# Patient Record
Sex: FEMALE | Race: WHITE | NOT HISPANIC OR LATINO | Employment: OTHER | URBAN - METROPOLITAN AREA
[De-identification: names, ages, dates, MRNs, and addresses within clinical notes are randomized per-mention and may not be internally consistent; named-entity substitution may affect disease eponyms.]

---

## 2017-01-10 ENCOUNTER — TELEPHONE (OUTPATIENT)
Dept: NEUROLOGY | Facility: CLINIC | Age: 67
End: 2017-01-10

## 2017-01-10 NOTE — TELEPHONE ENCOUNTER
----- Message from Lara Parekh sent at 1/10/2017 12:10 PM CST -----  Contact: Veena[facility]  Veena called and wanted to know if this patient's PO steroids are going to be extended.    Phone: [phone]

## 2017-01-23 ENCOUNTER — HOSPITAL ENCOUNTER (OUTPATIENT)
Dept: RADIOLOGY | Facility: HOSPITAL | Age: 67
Discharge: HOME | End: 2017-01-23
Attending: PSYCHIATRY & NEUROLOGY
Payer: MEDICARE

## 2017-01-23 DIAGNOSIS — G35 MULTIPLE SCLEROSIS: ICD-10-CM

## 2017-01-23 PROCEDURE — 25500020 PHARM REV CODE 255: Performed by: PSYCHIATRY & NEUROLOGY

## 2017-01-23 PROCEDURE — 70553 MRI BRAIN STEM W/O & W/DYE: CPT | Mod: TC

## 2017-01-23 PROCEDURE — A9585 GADOBUTROL INJECTION: HCPCS | Performed by: PSYCHIATRY & NEUROLOGY

## 2017-01-23 PROCEDURE — 70553 MRI BRAIN STEM W/O & W/DYE: CPT | Mod: 26,,, | Performed by: RADIOLOGY

## 2017-01-23 RX ORDER — GADOBUTROL 604.72 MG/ML
8.5 INJECTION INTRAVENOUS
Status: COMPLETED | OUTPATIENT
Start: 2017-01-23 | End: 2017-01-23

## 2017-01-23 RX ADMIN — GADOBUTROL 8.5 ML: 604.72 INJECTION INTRAVENOUS at 01:01

## 2017-02-07 ENCOUNTER — TELEPHONE (OUTPATIENT)
Dept: NEUROLOGY | Facility: CLINIC | Age: 67
End: 2017-02-07

## 2017-02-07 ENCOUNTER — DOCUMENTATION ONLY (OUTPATIENT)
Dept: NEUROLOGY | Facility: CLINIC | Age: 67
End: 2017-02-07

## 2017-02-07 NOTE — TELEPHONE ENCOUNTER
PA for Provigil completed via CMM. Call placed to [facility]. Spoke with Sandra. She states they have 90 day refills on file for Ampyra for pt, but her insurance will only allow for 30 day fills at a time.    Call placed to pt. Informed her that PA has now been completed and is pending. Also informed her about Ampyra rx as above. Verbalizes understanding of all.

## 2017-02-07 NOTE — TELEPHONE ENCOUNTER
----- Message from Ekaterina Pereyra sent at 2017  3:50 PM CST -----  Contact: self @ [phone]  pts prior auth for modafinil (PROVIGIL) 200 MG Tab is about to .  Pt will need the new prior auth before 17.  Pt is req a new 90 day prescription for dalfampridine (AMPYRA) 10 mg Tb12.  Pt says her current prescription is only for a 30 day supply and she has to request it to often from the mail in pharmacy.  [facility]

## 2017-03-14 ENCOUNTER — TELEPHONE (OUTPATIENT)
Dept: NEUROLOGY | Facility: CLINIC | Age: 67
End: 2017-03-14

## 2017-03-14 NOTE — TELEPHONE ENCOUNTER
----- Message from Nunu Sanchez sent at 3/14/2017  9:52 AM CDT -----  Contact: self  Pt had to cx her appt for 3/20 because her  is having eye surgery that day.  She would like to reschedule to the week of 4/3 please.  Call her at [phone]

## 2017-04-19 ENCOUNTER — OFFICE VISIT (OUTPATIENT)
Dept: NEUROLOGY | Facility: CLINIC | Age: 67
End: 2017-04-19
Payer: MEDICARE

## 2017-04-19 VITALS
WEIGHT: 188 LBS | SYSTOLIC BLOOD PRESSURE: 169 MMHG | HEART RATE: 72 BPM | HEIGHT: 60 IN | BODY MASS INDEX: 36.91 KG/M2 | DIASTOLIC BLOOD PRESSURE: 86 MMHG

## 2017-04-19 DIAGNOSIS — Z71.89 COUNSELING REGARDING GOALS OF CARE: ICD-10-CM

## 2017-04-19 DIAGNOSIS — G47.33 OSA (OBSTRUCTIVE SLEEP APNEA): ICD-10-CM

## 2017-04-19 DIAGNOSIS — R26.9 GAIT DISTURBANCE: ICD-10-CM

## 2017-04-19 DIAGNOSIS — R53.82 CHRONIC FATIGUE: ICD-10-CM

## 2017-04-19 DIAGNOSIS — G35 MULTIPLE SCLEROSIS: Primary | ICD-10-CM

## 2017-04-19 DIAGNOSIS — Z29.89 PROPHYLACTIC IMMUNOTHERAPY: ICD-10-CM

## 2017-04-19 PROCEDURE — 99214 OFFICE O/P EST MOD 30 MIN: CPT | Mod: PBBFAC | Performed by: PHYSICIAN ASSISTANT

## 2017-04-19 PROCEDURE — 99999 PR PBB SHADOW E&M-EST. PATIENT-LVL IV: CPT | Mod: PBBFAC,,, | Performed by: PHYSICIAN ASSISTANT

## 2017-04-19 PROCEDURE — 99215 OFFICE O/P EST HI 40 MIN: CPT | Mod: S$PBB,,, | Performed by: PHYSICIAN ASSISTANT

## 2017-04-19 NOTE — MR AVS SNAPSHOT
[facility]  [address]  [facility] [address]  Phone: [phone]                  Marie Lejeune   2017 12:45 PM   Office Visit    Description:  Female : 1950   Provider:  Ceci Servin PA-C   Department:  [facility]           Reason for Visit     Neurologic Problem           Diagnoses this Visit        Comments    Multiple sclerosis    -  Primary     ROLAN (obstructive sleep apnea)         Counseling regarding goals of care         Gait disturbance         Chronic fatigue         Prophylactic immunotherapy                To Do List           Future Appointments        Provider Department Dept Phone    10/18/2017 1:00 PM MD [facility] [phone]      Goals (5 Years of Data)     None      Follow-Up and Disposition     Return in about 6 months (around 10/19/2017).      [facility]     [facility] Nurse Care Line -  Assistance  Unless otherwise directed by your provider, please contact [facility], our nurse care line that is available for  assistance.     Registered nurses in the [facility] Center provide: appointment scheduling, clinical advisement, health education, and other advisory services.  Call: [phone] (toll free)               Medications                Verify that the below list of medications is an accurate representation of the medications you are currently taking.  If none reported, the list may be blank. If incorrect, please contact your healthcare provider. Carry this list with you in case of emergency.           Current Medications     amlodipine (NORVASC) 10 MG tablet Take 10 mg by mouth once daily.    ascorbic acid (VITAMIN C) 500 MG tablet Take 500 mg by mouth once daily.    atenolol (TENORMIN) 50 MG tablet Take 25 mg by mouth once daily.    biotin 300 mcg Tab PATIENT NOT CERTAIN ON THE DOSAGE. TAKE ONE capsule by MOUTH daily    calcium carbonate (OS-ANISHA) 600 mg  (1,500 mg) Tab Take 600 mg by mouth 2 (two) times daily with meals.    co-enzyme Q-10 30 mg capsule Take 400 mg by mouth once daily.    cyanocobalamin (VITAMIN B-12) 500 MCG tablet Take 500 mcg by mouth once daily.    dalfampridine (AMPYRA) 10 mg Tb12 Take 10 mg by mouth 2 (two) times daily. Extended Release 12 hour    ergocalciferol (VITAMIN D2) 50,000 unit Cap Take 5,000 Units by mouth once daily.     fish oil-omega-3 fatty acids 300-1,000 mg capsule Take 1,000 g by mouth once daily.    glatiramer (COPAXONE) 40 mg/mL Syrg injection Inject 40 mg into the skin 3 (three) times a week.    methylPREDNISolone sodium succinate (SOLU-MEDROL) 1,000 mg injection Solumedrol 1gm IV solution to be mixed in a smoothie and taken orally once a month for 6 months total. To begin in August.    modafinil (PROVIGIL) 200 MG Tab Take 1 tablet (200 mg total) by mouth 2 (two) times daily.    multivitamin (ONE DAILY MULTIVITAMIN) per tablet Take 1 tablet by mouth once daily.    simvastatin (ZOCOR) 40 MG tablet Take 40 mg by mouth every evening.           Clinical Reference Information           Your Vitals Were     BP Pulse Height Weight BMI    169/86 72 5' (1.524 m) 85.3 kg (188 lb) 36.72 kg/m2      Blood Pressure          Most Recent Value    BP  (!)  169/86      Allergies as of 4/19/2017     No Known Allergies      Immunizations Administered on Date of Encounter - 4/19/2017     None      Language Assistance Services     ATTENTION: Language assistance services are available, free of charge. Please call [phone].      ATENCIÓN: Si asha chery, tiene a talley disposición servicios gratuitos de asistencia lingüística. Llame al [phone].     GI Ý: N?u b?n nói Ti?ng Vi?t, có các d?ch v? h? tr? ngôn ng? mi?n phí dành cho b?n. G?i s? [phone].         [facility] complies with applicable Federal civil rights laws and does not discriminate on the basis of race, color, national origin, age, disability, or sex.

## 2017-04-19 NOTE — PROGRESS NOTES
"Subjective:       Patient ID: Marie Lejeune is a 66 y.o. female who presents today for a routine clinic visit with her  for MS. Patient provides history today.     MS HPI:  · DMT: Copaxone  · Side effects from DMT? No  · Taking vitamin D3 as recommended? Yes -  Dose: 5,000 IU daily  · Patient feeling well overall.  · BP slighly elevated today; however, at home she states it's "pretty steady"  · Patient rides stationary bike almost every day for about 20 minutes, she is looking forward to getting back into her pool over the summer for exercise as well.   · Has started Nutri system and has lost "a few pounds"    SOCIAL HISTORY  Social History   Substance Use Topics    Smoking status: Never Smoker    Smokeless tobacco: Never Used    Alcohol use None     Living arrangements - the patient lives with her .  Employment Retired    MS ROS:  · Fatigue: Yes - Provigil twice  · Sleep Disturbance: No, has ROLAN but is unable to use CPAP  · Bladder Dysfunction: No  · Bowel Dysfunction: No  · Spasticity: No  · Visual Symptoms: Yes - stable  · Cognitive: Yes - worse with heat and fatigue  · Mood Disorder: No  · Gait Disturbance: Yes - Ampyra   · Falls: No  · Hand Dysfunction: No  · Pain: No  · Sexual Dysfunction: Not Assessed  · Skin Breakdown: No  · Tremors: No  · Dysphagia:  No  · Dysarthria:  No  · Heat sensitivity:  Yes   · Any un-met adaptive needs? No  · Copay Assist?  Yes -0$  · Clinical Trial candidate? No        Objective:        1. 25 foot timed walk:  Timed 25 Foot Walk: 4/19/2017   Did patient wear an AFO? No   Was assistive device used? Yes   Assistive device used (cira one): Bilateral Assistance   Bilateral device used Walker/Rollator   Time for 25 Foot Walk (seconds) 13.9       Neurologic Exam     Mental Status   Oriented to person, place, and time.   Follows 3 step commands.   Speech: speech is normal   Level of consciousness: alert  Normal comprehension.     Cranial Nerves     CN II   Visual acuity: " decreased (patient without glasses today; 20/200 OD 20/60 OS)    CN III, IV, VI   Pupils are equal, round, and reactive to light.  Extraocular motions are normal.     CN V   Facial sensation intact.     CN VII   Facial expression full, symmetric.     CN VIII   Hearing: intact (finger rub)    CN IX, X   Palate: symmetric    CN XI   CN XI normal.     CN XII   Tongue deviation: none       Disc pallor OD     Motor Exam   Muscle bulk: normal  Right arm tone: normal  Left arm tone: normal  Right leg tone: increased  Left leg tone: increased    Strength   Right deltoid: 5/5  Left deltoid: 5/5  Right triceps: 5/5  Left triceps: 5/5  Right wrist extension: 5/5  Left wrist extension: 5/5  Right interossei: 5/5  Left interossei: 5/5  Right iliopsoas: 3/5  Left iliopsoas: 3/5  Right hamstrin/5  Left hamstrin/5  Right anterior tibial: 5/5  Left anterior tibial: 5/5  Right peroneal: 4/5  Left peroneal: 5/5       3+/5 bilateral hip flexors     Sensory Exam   Light touch normal.   Right arm vibration: normal  Left arm vibration: normal  Right leg vibration: decreased from toes  Left leg vibration: decreased from toes    Gait, Coordination, and Reflexes     Gait  Gait: circumduction (L)    Coordination   Finger to nose coordination: normal  Tandem walking coordination: abnormal    Tremor   Resting tremor: absent  Action tremor: absent    Reflexes   Right brachioradialis: 2+  Left brachioradialis: 2+  Right biceps: 2+  Left biceps: 2+  Right triceps: 2+  Left triceps: 2+  Right patellar: 3+  Left patellar: 3+  Right achilles: 2+  Left achilles: 2+  Right plantar: upgoing  Left plantar: upgoing  Right ankle clonus: absent  Left ankle clonus: absent  Right pendular knee jerk: absent  Left pendular knee jerk: absent         Normal RSM UE's         Imaging:     Results for orders placed during the hospital encounter of 17   MRI Brain W WO Contrast    Narrative MRI brain with contrast    17 12:09:25    Accession#  [id number]    CLINICAL INDICATION: 66 year old F with MS      TECHNIQUE: Multiplanar multisequence MR imaging of the brain was performed before and after the administration of 8.5 ml Gadavistintravenous contrast.   COMPARISON:  07/18/2016    FINDINGS:    The ventricles are normal in size for age, without evidence of hydrocephalus.    Multiple discrete T2/FLAIR hyperintense foci again present throughout the supra-tentorial white matter, consistent with the reported history of multiple sclerosis. Many lesions exhibit the typical radiating perpendicular orientation relative to the lateral ventricles. No definite new discrete lesions are identified. No abnormal postcontrast enhancement to indicate active demyelination. No new parenchymal mass, hemorrhage or infarction.    No extra-axial blood or fluid collections.     T2 skull base flow voids are preserved. Bone marrow signal intensity is unremarkable.    Impression Stable appearance of the brain, again noting multiple supratentorial white matter lesions consistent with the reported history of multiple sclerosis. No new or enhancing lesion to indicate ongoing or active demyelination.    Electronically signed by: SARTHAK LOVE MD  Date:     01/23/17  Time:    15:05      Labs:     Lab Results   Component Value Date    BVTXLGSI06RJ >96 (A) 07/14/2016     Lab Results   Component Value Date    JCVINDEX 2.87 (A) 07/14/2016    JCVANTIBODY Positive (A) 07/14/2016     Lab Results   Component Value Date    CO1HJYSQ 64.8 11/14/2016    ABSOLUTECD3 654 (L) 11/14/2016    WY2KCQJB 20.5 11/14/2016    ABSOLUTECD8 207 11/14/2016    AI4XCANA 44.1 11/14/2016    ABSOLUTECD4 445 11/14/2016    LABCD48 2.15 11/14/2016     Lab Results   Component Value Date    CREATININE 0.9 01/23/2017       Diagnosis/Assessment/Plan:    1. Multiple Sclerosis  · Assessment: Patient's timed walk is improved today. She has transitioned well onto Copaxone and appears both clinically and radiographically  stable  · Imagin month interval MRI stable on Copaxone. Report and images reviewed with patient and  today in clinic  · Disease Modifying Therapies: Continue Copaxone and high dose Vit D3.     2. MS Symptom Assessment / Management  · Fatigue: continue Provigil BID  · Sleep Disturbance: ROLAN-not able to use cpap-focusing on weight loss  · Visual Symptoms: Stable-has seen Dr. López to establish care(2016)  · Gait Disturbance: continue Ampyra-will continue to monitor CrCL      Over 50% of this 40 minute visit was spent in direct face to face counseling of the patient regarding her current symptoms and management of same, review of MRI, medication management.  Follow up in 6 months with Dr. Joshua  Patient agreed to POC today.    Attending, Dr. Joshua, was available during today's encounter. Any change to plan along with cosign to appear in the EMR.     Ceci Servin PA-C  [facility]    Multiple sclerosis    ROLAN (obstructive sleep apnea)    Counseling regarding goals of care    Gait disturbance    Chronic fatigue    Prophylactic immunotherapy

## 2017-07-11 DIAGNOSIS — G35 MULTIPLE SCLEROSIS: ICD-10-CM

## 2017-07-11 DIAGNOSIS — R53.82 CHRONIC FATIGUE: ICD-10-CM

## 2017-07-11 DIAGNOSIS — G47.33 OSA (OBSTRUCTIVE SLEEP APNEA): ICD-10-CM

## 2017-07-11 RX ORDER — MODAFINIL 200 MG/1
200 TABLET ORAL 2 TIMES DAILY
Qty: 180 TABLET | Refills: 1 | Status: SHIPPED | OUTPATIENT
Start: 2017-07-11 | End: 2017-08-08 | Stop reason: SDUPTHER

## 2017-07-11 NOTE — TELEPHONE ENCOUNTER
----- Message from Kindra Salazar sent at 7/11/2017  1:10 PM CDT -----  Contact: PT  Refill modafinil (PROVIGIL) 200 MG Tab, dalfampridine (AMPYRA) 10 mg Tb12    PT is out of medication, her next appt is not until 10-18-17    She also need both Rx to be a 90 day supply.     Pharmacy: [phone]

## 2017-08-08 DIAGNOSIS — G35 MULTIPLE SCLEROSIS: ICD-10-CM

## 2017-08-08 DIAGNOSIS — R53.82 CHRONIC FATIGUE: ICD-10-CM

## 2017-08-08 DIAGNOSIS — G47.33 OSA (OBSTRUCTIVE SLEEP APNEA): ICD-10-CM

## 2017-08-08 RX ORDER — MODAFINIL 200 MG/1
200 TABLET ORAL 2 TIMES DAILY
Qty: 180 TABLET | Refills: 1 | Status: SHIPPED | OUTPATIENT
Start: 2017-08-08 | End: 2017-11-08 | Stop reason: SDUPTHER

## 2017-08-08 NOTE — TELEPHONE ENCOUNTER
Called [facility], spoke with Farooq and cancelled Modafinil prescription on file.

## 2017-08-08 NOTE — TELEPHONE ENCOUNTER
----- Message from Ekaterina Pereyra sent at 8/8/2017  3:25 PM CDT -----  Contact: self @ [phone]  Pt is calling for the status of her refill request for modafinil (PROVIGIL) 200 MG Tab 90 day supply with refills.  [facility] [employer]

## 2017-08-14 ENCOUNTER — TELEPHONE (OUTPATIENT)
Dept: NEUROLOGY | Facility: CLINIC | Age: 67
End: 2017-08-14

## 2017-08-14 DIAGNOSIS — Z79.899 HIGH RISK MEDICATION USE: ICD-10-CM

## 2017-08-14 DIAGNOSIS — G35 MULTIPLE SCLEROSIS: Primary | ICD-10-CM

## 2017-08-15 ENCOUNTER — LAB VISIT (OUTPATIENT)
Dept: LAB | Facility: HOSPITAL | Age: 67
End: 2017-08-15
Attending: PHYSICIAN ASSISTANT
Payer: MEDICARE

## 2017-08-15 DIAGNOSIS — Z79.899 HIGH RISK MEDICATION USE: ICD-10-CM

## 2017-08-15 DIAGNOSIS — G35 MULTIPLE SCLEROSIS: ICD-10-CM

## 2017-08-15 LAB
ALBUMIN SERPL BCP-MCNC: 4 G/DL
ALP SERPL-CCNC: 72 U/L
ALT SERPL W/O P-5'-P-CCNC: 18 U/L
ANION GAP SERPL CALC-SCNC: 7 MMOL/L
AST SERPL-CCNC: 19 U/L
BILIRUB SERPL-MCNC: 0.3 MG/DL
BUN SERPL-MCNC: 19 MG/DL
CALCIUM SERPL-MCNC: 10.1 MG/DL
CHLORIDE SERPL-SCNC: 98 MMOL/L
CO2 SERPL-SCNC: 30 MMOL/L
CREAT SERPL-MCNC: 0.8 MG/DL
EST. GFR  (AFRICAN AMERICAN): >60 ML/MIN/1.73 M^2
EST. GFR  (NON AFRICAN AMERICAN): >60 ML/MIN/1.73 M^2
GLUCOSE SERPL-MCNC: 115 MG/DL
POTASSIUM SERPL-SCNC: 4.2 MMOL/L
PROT SERPL-MCNC: 7.4 G/DL
SODIUM SERPL-SCNC: 135 MMOL/L

## 2017-08-15 PROCEDURE — 36415 COLL VENOUS BLD VENIPUNCTURE: CPT

## 2017-08-15 PROCEDURE — 80053 COMPREHEN METABOLIC PANEL: CPT

## 2017-08-17 RX ORDER — DALFAMPRIDINE 10 MG/1
10 TABLET, FILM COATED, EXTENDED RELEASE ORAL 2 TIMES DAILY
Qty: 180 TABLET | Refills: 1 | Status: SHIPPED | OUTPATIENT
Start: 2017-08-17 | End: 2017-11-09 | Stop reason: SDUPTHER

## 2017-10-09 ENCOUNTER — TELEPHONE (OUTPATIENT)
Dept: NEUROLOGY | Facility: CLINIC | Age: 67
End: 2017-10-09

## 2017-10-09 NOTE — TELEPHONE ENCOUNTER
----- Message from Ekaterina Pereyra sent at 10/9/2017  9:30 AM CDT -----  Contact: self @ [phone]  Pt is scheduled to f/u with dr dubois on 10-18-17.  Pt is calling to see if she can reschedule for the end of October or the beginning of November.  pls call with a new afternoon appt.

## 2017-11-08 ENCOUNTER — OFFICE VISIT (OUTPATIENT)
Dept: NEUROLOGY | Facility: CLINIC | Age: 67
End: 2017-11-08
Payer: MEDICARE

## 2017-11-08 ENCOUNTER — LAB VISIT (OUTPATIENT)
Dept: LAB | Facility: HOSPITAL | Age: 67
End: 2017-11-08
Payer: MEDICARE

## 2017-11-08 VITALS
BODY MASS INDEX: 35.93 KG/M2 | HEART RATE: 73 BPM | HEIGHT: 60 IN | DIASTOLIC BLOOD PRESSURE: 84 MMHG | SYSTOLIC BLOOD PRESSURE: 158 MMHG | WEIGHT: 183 LBS

## 2017-11-08 DIAGNOSIS — R53.82 CHRONIC FATIGUE: ICD-10-CM

## 2017-11-08 DIAGNOSIS — G35 MULTIPLE SCLEROSIS: ICD-10-CM

## 2017-11-08 DIAGNOSIS — Z86.69 HISTORY OF OPTIC NEURITIS: ICD-10-CM

## 2017-11-08 DIAGNOSIS — G35 MULTIPLE SCLEROSIS: Primary | ICD-10-CM

## 2017-11-08 DIAGNOSIS — Z29.89 PROPHYLACTIC IMMUNOTHERAPY: ICD-10-CM

## 2017-11-08 DIAGNOSIS — R26.9 GAIT DISTURBANCE: ICD-10-CM

## 2017-11-08 DIAGNOSIS — Z71.89 COUNSELING REGARDING GOALS OF CARE: ICD-10-CM

## 2017-11-08 DIAGNOSIS — G47.33 OSA (OBSTRUCTIVE SLEEP APNEA): ICD-10-CM

## 2017-11-08 LAB
CREAT SERPL-MCNC: 0.8 MG/DL
EST. GFR  (AFRICAN AMERICAN): >60 ML/MIN/1.73 M^2
EST. GFR  (NON AFRICAN AMERICAN): >60 ML/MIN/1.73 M^2

## 2017-11-08 PROCEDURE — 99213 OFFICE O/P EST LOW 20 MIN: CPT | Mod: PBBFAC | Performed by: PHYSICIAN ASSISTANT

## 2017-11-08 PROCEDURE — 82565 ASSAY OF CREATININE: CPT

## 2017-11-08 PROCEDURE — 99215 OFFICE O/P EST HI 40 MIN: CPT | Mod: S$PBB,,, | Performed by: PHYSICIAN ASSISTANT

## 2017-11-08 PROCEDURE — 99999 PR PBB SHADOW E&M-EST. PATIENT-LVL III: CPT | Mod: PBBFAC,,, | Performed by: PHYSICIAN ASSISTANT

## 2017-11-08 PROCEDURE — 36415 COLL VENOUS BLD VENIPUNCTURE: CPT

## 2017-11-08 RX ORDER — LOSARTAN POTASSIUM 100 MG/1
TABLET ORAL
COMMUNITY
Start: 2017-09-24

## 2017-11-08 RX ORDER — MODAFINIL 200 MG/1
200 TABLET ORAL 2 TIMES DAILY
Qty: 180 TABLET | Refills: 1 | Status: SHIPPED | OUTPATIENT
Start: 2017-11-08 | End: 2018-05-07 | Stop reason: SDUPTHER

## 2017-11-08 RX ORDER — GLATIRAMER 40 MG/ML
40 INJECTION, SOLUTION SUBCUTANEOUS
Qty: 36 SYRINGE | Refills: 5 | Status: SHIPPED | OUTPATIENT
Start: 2017-11-08 | End: 2018-05-07 | Stop reason: SDUPTHER

## 2017-11-08 NOTE — PROGRESS NOTES
Subjective:       Patient ID: Marie Lejeune is a 67 y.o. female who presents today for a routine clinic visit for MS.    MS HPI:  · DMT: Copaxone  · Side effects from DMT? No  · Taking vitamin D3 as recommended? Yes -  Dose: 5,000 IU daily  · Patient states she is feeling well    SOCIAL HISTORY  Social History   Substance Use Topics    Smoking status: Never Smoker    Smokeless tobacco: Never Used    Alcohol use Not on file     Living arrangements - the patient lives with her .  Employment Retired    MS ROS:  · Fatigue: Yes - Provigil 200mg BID  · Sleep Disturbance: Yes - Melatonin, diagnosed ROLAN(not using CPAP)  · Bladder Dysfunction: No--drinks a lot a water  · Bowel Dysfunction: No  · Spasticity: No  · Visual Symptoms: No--history of ON  · Cognitive: No  · Mood Disorder: No  · Gait Disturbance: Yes - Ampyra-requests refill--she feels beneficial  · Falls: No  · Hand Dysfunction: No  · Pain: No  · Sexual Dysfunction: Not Assessed  · Skin Breakdown: No  · Tremors: No  · Dysphagia:  No  · Dysarthria:  No  · Heat sensitivity:  Yes - extremes of temperature are issue  · Any un-met adaptive needs? No  · Copay Assist?  Yes -0$  · Clinical Trial candidate? No          Objective:        1. 25 foot timed walk:14.2 with tri-wheeled walker; 15.2s with tri-wheeled walker(June 2016)  Timed 25 Foot Walk: 4/19/2017   Did patient wear an AFO? No   Was assistive device used? Yes   Assistive device used (cira one): Bilateral Assistance   Bilateral device used Walker/Rollator   Time for 25 Foot Walk (seconds) 13.9       Neurologic Exam    Neurologic Exam      Mental Status   Oriented to person, place, and time.   Follows 3 step commands.   Speech: speech is normal   Level of consciousness: alert  Normal comprehension.      Cranial Nerves    Disc pallor noted OD  Acuity OD: corrected 20/200; OS : corrected 20/20(stable)     CN III, IV, VI   Pupils are equal, round, and reactive to light.  Extraocular motions are normal.       CN V   Facial sensation intact.      CN VII   Facial expression full, symmetric.      CN VIII   Hearing: intact (finger rub)     CN IX, X   Palate: symmetric     CN XI   CN XI normal.      CN XII   Tongue deviation: none       Disc pallor OD      Motor Exam   Muscle bulk: normal  Right arm tone: normal  Left arm tone: normal  Right leg tone: increased  Left leg tone: increased     Strength   Right deltoid: 5/5  Left deltoid: 5/5  Right triceps: 5/5  Left triceps: 5/5  Right wrist extension: 5/5  Left wrist extension: 5/5  Right interossei: 5/5  Left interossei: 5/5  Right iliopsoas: 3/5  Left iliopsoas: 3/5  Right hamstrin/5  Left hamstrin/5  Right anterior tibial: 5/5  Left anterior tibial: 5/5  Right peroneal: 4/5  Left peroneal: 5/5       3+/5 bilateral hip flexors      Sensory Exam   Light touch normal.   Right arm vibration: normal  Left arm vibration: normal  Right leg vibration: decreased from toes  Left leg vibration: decreased from toes     Gait, Coordination, and Reflexes      Gait  Gait: circumduction (L)     Coordination   Finger to nose coordination: normal  Tandem walking coordination: abnormal     Tremor   Resting tremor: absent  Action tremor: absent     Reflexes   Right brachioradialis: 2+  Left brachioradialis: 2+  Right biceps: 2+  Left biceps: 2+  Right triceps: 2+  Left triceps: 2+  Right patellar: 3+  Left patellar: 3+  Right achilles: 2+  Left achilles: 2+  Right ankle clonus: absent  Left ankle clonus: absent  Right pendular knee jerk: absent  Left pendular knee jerk: absent         Normal RSM UE's      Imaging:     Results for orders placed during the hospital encounter of 17   MRI Brain W WO Contrast    Impression Stable appearance of the brain, again noting multiple supratentorial white matter lesions consistent with the reported history of multiple sclerosis. No new or enhancing lesion to indicate ongoing or active demyelination.        Electronically signed by: SARTHAK  IVAN ARMAS  Date:     01/23/17  Time:    15:05      No results found for this or any previous visit.  No results found for this or any previous visit.      Labs:       Lab Results   Component Value Date    HCPSLUOK06BH >96 (A) 07/14/2016     Lab Results   Component Value Date    JCVINDEX 2.87 (A) 07/14/2016    JCVANTIBODY Positive (A) 07/14/2016     Lab Results   Component Value Date    AP2XMJDJ 64.8 11/14/2016    ABSOLUTECD3 654 (L) 11/14/2016    ME7EYWLK 20.5 11/14/2016    ABSOLUTECD8 207 11/14/2016    FB1XXHVP 44.1 11/14/2016    ABSOLUTECD4 445 11/14/2016    LABCD48 2.15 11/14/2016     Lab Results   Component Value Date    WBC 8.85 11/14/2016    HGB 13.4 11/14/2016    HCT 39.9 11/14/2016    MCV 92 11/14/2016     11/14/2016     Sodium   Date Value Ref Range Status   08/15/2017 135 (L) 136 - 145 mmol/L Final     Potassium   Date Value Ref Range Status   08/15/2017 4.2 3.5 - 5.1 mmol/L Final     Chloride   Date Value Ref Range Status   08/15/2017 98 95 - 110 mmol/L Final     CO2   Date Value Ref Range Status   08/15/2017 30 (H) 23 - 29 mmol/L Final     Glucose   Date Value Ref Range Status   08/15/2017 115 (H) 70 - 110 mg/dL Final     BUN, Bld   Date Value Ref Range Status   08/15/2017 19 8 - 23 mg/dL Final     Creatinine   Date Value Ref Range Status   11/08/2017 0.8 0.5 - 1.4 mg/dL Final     Calcium   Date Value Ref Range Status   08/15/2017 10.1 8.7 - 10.5 mg/dL Final     Total Protein   Date Value Ref Range Status   08/15/2017 7.4 6.0 - 8.4 g/dL Final     Albumin   Date Value Ref Range Status   08/15/2017 4.0 3.5 - 5.2 g/dL Final     Total Bilirubin   Date Value Ref Range Status   08/15/2017 0.3 0.1 - 1.0 mg/dL Final     Comment:     For infants and newborns, interpretation of results should be based  on gestational age, weight and in agreement with clinical  observations.  Premature Infant recommended reference ranges:  Up to 24 hours.............<8.0 mg/dL  Up to 48 hours............<12.0 mg/dL  3-5  days..................<15.0 mg/dL  6-29 days.................<15.0 mg/dL       Alkaline Phosphatase   Date Value Ref Range Status   08/15/2017 72 55 - 135 U/L Final     AST   Date Value Ref Range Status   08/15/2017 19 10 - 40 U/L Final     ALT   Date Value Ref Range Status   08/15/2017 18 10 - 44 U/L Final     Anion Gap   Date Value Ref Range Status   08/15/2017 7 (L) 8 - 16 mmol/L Final     eGFR if    Date Value Ref Range Status   11/08/2017 >60.0 >60 mL/min/1.73 m^2 Final     eGFR if non    Date Value Ref Range Status   11/08/2017 >60.0 >60 mL/min/1.73 m^2 Final     Comment:     Calculation used to obtain the estimated glomerular filtration  rate (eGFR) is the CKD-EPI equation.          Diagnosis/Assessment/Plan:    1. Multiple Sclerosis  · Assessment: Patient is stable today on Copaxone-refilled today  · Imaging:annual MRI January 2018-ordered today  · Disease Modifying Therapies: Continue Copaxone and high dose Vit D3    2. MS Symptom Assessment / Management  · Fatigue: refilled Provigil  · Gait Disturbance: checking creatinine-will refill Ampyra if ok      Over 50% of this 40 minute visit was spent in direct face to face counseling of the patient regarding her current symptoms and management of same.  Follow up in 6 months with Dr. Joshua  Patient agreed to POC today.    Attending, Dr. Joshua, was available during today's encounter. Any change to plan along with cosign to appear in the EMR.     Ceci Servin PA-C  [facility]    Multiple sclerosis  -     glatiramer (COPAXONE) 40 mg/mL Syrg injection; Inject 40 mg into the skin 3 (three) times a week.  Dispense: 36 Syringe; Refill: 5  -     modafinil (PROVIGIL) 200 MG Tab; Take 1 tablet (200 mg total) by mouth 2 (two) times daily.  Dispense: 180 tablet; Refill: 1  -     CREATININE, SERUM; Future; Expected date: 11/22/2017  -     MRI Brain W WO Contrast; Future; Expected date: 01/22/2018    ROLAN (obstructive sleep apnea)  -      modafinil (PROVIGIL) 200 MG Tab; Take 1 tablet (200 mg total) by mouth 2 (two) times daily.  Dispense: 180 tablet; Refill: 1    Chronic fatigue  -     modafinil (PROVIGIL) 200 MG Tab; Take 1 tablet (200 mg total) by mouth 2 (two) times daily.  Dispense: 180 tablet; Refill: 1

## 2017-11-09 DIAGNOSIS — G35 MULTIPLE SCLEROSIS: Primary | ICD-10-CM

## 2017-11-09 DIAGNOSIS — R26.9 ABNORMALITY OF GAIT: ICD-10-CM

## 2017-11-09 RX ORDER — DALFAMPRIDINE 10 MG/1
10 TABLET, FILM COATED, EXTENDED RELEASE ORAL 2 TIMES DAILY
Qty: 180 TABLET | Refills: 1 | Status: SHIPPED | OUTPATIENT
Start: 2017-11-09 | End: 2018-05-07 | Stop reason: SDUPTHER

## 2017-11-09 NOTE — TELEPHONE ENCOUNTER
----- Message from Ceci Servin PA-C sent at 11/8/2017  2:33 PM CST -----  Please help keep eye out for creatinine lab-once ok we can refill Ampyra

## 2018-01-18 ENCOUNTER — TELEPHONE (OUTPATIENT)
Dept: NEUROLOGY | Facility: CLINIC | Age: 68
End: 2018-01-18

## 2018-01-18 NOTE — TELEPHONE ENCOUNTER
----- Message from Ekaterina Pereyra sent at 1/18/2018  1:54 PM CST -----  Contact: self @ [phone]   Pt received a recall letter requesting that she f/u with Ceci around 5-7-18.  Pls call with an appt.

## 2018-01-31 ENCOUNTER — HOSPITAL ENCOUNTER (OUTPATIENT)
Dept: RADIOLOGY | Facility: HOSPITAL | Age: 68
Discharge: HOME | End: 2018-01-31
Attending: PHYSICIAN ASSISTANT
Payer: MEDICARE

## 2018-01-31 DIAGNOSIS — G35 MULTIPLE SCLEROSIS: ICD-10-CM

## 2018-01-31 PROCEDURE — A9585 GADOBUTROL INJECTION: HCPCS | Performed by: PHYSICIAN ASSISTANT

## 2018-01-31 PROCEDURE — 70553 MRI BRAIN STEM W/O & W/DYE: CPT | Mod: 26,,, | Performed by: RADIOLOGY

## 2018-01-31 PROCEDURE — 25500020 PHARM REV CODE 255: Performed by: PHYSICIAN ASSISTANT

## 2018-01-31 PROCEDURE — 70553 MRI BRAIN STEM W/O & W/DYE: CPT | Mod: TC

## 2018-01-31 RX ORDER — GADOBUTROL 604.72 MG/ML
8 INJECTION INTRAVENOUS
Status: COMPLETED | OUTPATIENT
Start: 2018-01-31 | End: 2018-01-31

## 2018-01-31 RX ADMIN — GADOBUTROL 8 ML: 604.72 INJECTION INTRAVENOUS at 02:01

## 2018-03-27 ENCOUNTER — TELEPHONE (OUTPATIENT)
Dept: NEUROLOGY | Facility: CLINIC | Age: 68
End: 2018-03-27

## 2018-03-27 NOTE — TELEPHONE ENCOUNTER
"Call placed to [facility]  and spoke with Krista re: PA for modafinil. Key created and PA attempted on [email or web address]. Response states "Clinical Override not needed."    Call placed to [facility] pharmacy and spoke with Deborah. Call then transferred to Julio César. He states that no PA is needed if she fills with generic.     Call placed to pt. She states she knew that her PA  in February and anticipated one needed to be done. Will call [facility] now to arrange refill shipment.    Length of task: 35 minutes  "

## 2018-03-27 NOTE — TELEPHONE ENCOUNTER
----- Message from Christopher Aly sent at 3/27/2018 12:21 PM CDT -----  Contact: Patient @ [phone]  Patient needs a prior authorization on (modafinil (PROVIGIL) 200 MG Tab )     [facility] - [place], [place] - [address] [place] [id number]  Phone: [phone] Fax: [phone]

## 2018-05-02 ENCOUNTER — TELEPHONE (OUTPATIENT)
Dept: NEUROLOGY | Facility: CLINIC | Age: 68
End: 2018-05-02

## 2018-05-02 NOTE — TELEPHONE ENCOUNTER
----- Message from Marisel Martinez sent at 5/2/2018  3:01 PM CDT -----  Contact: self  Pt is returning a call to the office to accept new appt date and time.     Pt can be reached at [phone].    Thank you

## 2018-05-02 NOTE — TELEPHONE ENCOUNTER
Left VM for patient to give our office a call to accept appointment with Dr. Joshua on 5/7/2018.  Appointment for 5/8/2018 was canceled due to provider attending [employer]

## 2018-05-07 ENCOUNTER — LAB VISIT (OUTPATIENT)
Dept: LAB | Facility: HOSPITAL | Age: 68
End: 2018-05-07
Attending: PSYCHIATRY & NEUROLOGY
Payer: MEDICARE

## 2018-05-07 ENCOUNTER — OFFICE VISIT (OUTPATIENT)
Dept: NEUROLOGY | Facility: CLINIC | Age: 68
End: 2018-05-07
Payer: MEDICARE

## 2018-05-07 VITALS
SYSTOLIC BLOOD PRESSURE: 147 MMHG | HEIGHT: 61 IN | HEART RATE: 80 BPM | BODY MASS INDEX: 36.06 KG/M2 | WEIGHT: 191 LBS | DIASTOLIC BLOOD PRESSURE: 88 MMHG

## 2018-05-07 DIAGNOSIS — R26.9 GAIT DISTURBANCE: ICD-10-CM

## 2018-05-07 DIAGNOSIS — G35 MS (MULTIPLE SCLEROSIS): ICD-10-CM

## 2018-05-07 DIAGNOSIS — G35 MULTIPLE SCLEROSIS: ICD-10-CM

## 2018-05-07 DIAGNOSIS — E55.9 VITAMIN D DEFICIENCY: ICD-10-CM

## 2018-05-07 DIAGNOSIS — R26.9 ABNORMALITY OF GAIT: ICD-10-CM

## 2018-05-07 DIAGNOSIS — G47.33 OSA (OBSTRUCTIVE SLEEP APNEA): ICD-10-CM

## 2018-05-07 DIAGNOSIS — E55.9 VITAMIN D DEFICIENCY: Primary | ICD-10-CM

## 2018-05-07 DIAGNOSIS — Z71.89 COUNSELING REGARDING GOALS OF CARE: ICD-10-CM

## 2018-05-07 DIAGNOSIS — R53.82 CHRONIC FATIGUE: ICD-10-CM

## 2018-05-07 DIAGNOSIS — Z29.89 PROPHYLACTIC IMMUNOTHERAPY: ICD-10-CM

## 2018-05-07 LAB
25(OH)D3+25(OH)D2 SERPL-MCNC: 137 NG/ML
ANION GAP SERPL CALC-SCNC: 9 MMOL/L
BUN SERPL-MCNC: 20 MG/DL
CALCIUM SERPL-MCNC: 10.7 MG/DL
CHLORIDE SERPL-SCNC: 98 MMOL/L
CO2 SERPL-SCNC: 29 MMOL/L
CREAT SERPL-MCNC: 0.8 MG/DL
EST. GFR  (AFRICAN AMERICAN): >60 ML/MIN/1.73 M^2
EST. GFR  (NON AFRICAN AMERICAN): >60 ML/MIN/1.73 M^2
GLUCOSE SERPL-MCNC: 117 MG/DL
POTASSIUM SERPL-SCNC: 4.4 MMOL/L
SODIUM SERPL-SCNC: 136 MMOL/L

## 2018-05-07 PROCEDURE — 99212 OFFICE O/P EST SF 10 MIN: CPT | Mod: PBBFAC | Performed by: PSYCHIATRY & NEUROLOGY

## 2018-05-07 PROCEDURE — 99215 OFFICE O/P EST HI 40 MIN: CPT | Mod: S$PBB,,, | Performed by: PSYCHIATRY & NEUROLOGY

## 2018-05-07 PROCEDURE — 82306 VITAMIN D 25 HYDROXY: CPT

## 2018-05-07 PROCEDURE — 99999 PR PBB SHADOW E&M-EST. PATIENT-LVL II: CPT | Mod: PBBFAC,,, | Performed by: PSYCHIATRY & NEUROLOGY

## 2018-05-07 PROCEDURE — 80048 BASIC METABOLIC PNL TOTAL CA: CPT

## 2018-05-07 PROCEDURE — 36415 COLL VENOUS BLD VENIPUNCTURE: CPT

## 2018-05-07 RX ORDER — GLATIRAMER 40 MG/ML
40 INJECTION, SOLUTION SUBCUTANEOUS
Qty: 36 SYRINGE | Refills: 5 | Status: SHIPPED | OUTPATIENT
Start: 2018-05-07 | End: 2019-06-11 | Stop reason: SDUPTHER

## 2018-05-07 RX ORDER — DALFAMPRIDINE 10 MG/1
10 TABLET, FILM COATED, EXTENDED RELEASE ORAL 2 TIMES DAILY
Qty: 180 TABLET | Refills: 1 | Status: SHIPPED | OUTPATIENT
Start: 2018-05-07 | End: 2018-09-25 | Stop reason: SDUPTHER

## 2018-05-07 RX ORDER — MODAFINIL 200 MG/1
200 TABLET ORAL 2 TIMES DAILY
Qty: 180 TABLET | Refills: 1 | Status: SHIPPED | OUTPATIENT
Start: 2018-05-07 | End: 2018-11-06 | Stop reason: SDUPTHER

## 2018-05-07 NOTE — PROGRESS NOTES
"Subjective:       Patient ID: Marie Lejeune is a 67 y.o. female who presents today for a routine clinic visit for MS.      MS HPI:  · DMT:  Copaxone  · Side effects from DMT? No  · Taking vitamin D3 as recommended? Yes - 5,000 IU / day   · She is doing well;  Feels she is gaining weight;    · She does have a lot of fatigue; does not that her disability is getting worse;    · She does aqua therapy every day in her in-ground pool; uses weights;  She works-out a lot; also does a DVD at home;  She also does her HOP at [facility]    SOCIAL HISTORY  Social History   Substance Use Topics    Smoking status: Never Smoker    Smokeless tobacco: Never Used    Alcohol use Not on file     Living arrangements - the patient lives with their spouse.  Employment:  no    MS ROS:  · Fatigue: Yes - Provigil 200mg BID  · Sleep Disturbance: she has ROLAN from sleep study in 2016; not able to tolerate CPAP mask; not willing to be referred to sleep MD; states she will "think about it".   · Bladder Dysfunction: No--drinks a lot a water  · Spasticity: No  · Cognitive: No  · Mood Disorder: No  · Gait Disturbance: Yes - Ampyra-requests refill--she feels beneficial; uses tri-wheeled walker;  · Falls: No  · Hand Dysfunction: No  · Pain: No  · Tremors: No  · Dysphagia:  No  · Dysarthria:  No  · Heat sensitivity:  Yes - mild  · Any un-met adaptive needs? She has a shower chair  · Copay Assist?  Yes -0$  · Clinical Trial candidate? No        Objective:        1. 25 foot timed walk: 12.8 sec with rollator;     Neurologic Exam      Cognitively intact  No LASHON, No dysarthria  MOTOR: 4/5 bilateral HF, o/w 5/5  REFLEXES 3+ t/o  GAIT: slow, walker, stable      Imaging:     Results for orders placed during the hospital encounter of 01/31/18   MRI Brain W WO Contrast    Impression  No significant change from prior.    Continued multiple scattered T2 flair lesions supratentorial parenchyma remains concerning for mild degree of prior demyelinating plaque.    No " definite new lesion or enhancing lesion to suggest significant interval or active demyelination     Clinical correlation and continued followup advised      Electronically signed by: FRANK RAMOS DO  Date:     02/01/18  Time:    14:32        Labs:     Lab Results   Component Value Date    LFGAKVXS67VT 137 (H) 05/07/2018    AVQFUGRY88WO >96 (A) 07/14/2016     Lab Results   Component Value Date    JCVINDEX 2.87 (A) 07/14/2016    JCVANTIBODY Positive (A) 07/14/2016     Lab Results   Component Value Date    CM8TLMLV 64.8 11/14/2016    ABSOLUTECD3 654 (L) 11/14/2016    MF1JXGVQ 20.5 11/14/2016    ABSOLUTECD8 207 11/14/2016    RQ1XYAQF 44.1 11/14/2016    ABSOLUTECD4 445 11/14/2016    LABCD48 2.15 11/14/2016     Lab Results   Component Value Date    WBC 8.85 11/14/2016    RBC 4.32 11/14/2016    HGB 13.4 11/14/2016    HCT 39.9 11/14/2016    MCV 92 11/14/2016    MCH 31.0 11/14/2016    MCHC 33.6 11/14/2016    RDW 13.5 11/14/2016     11/14/2016    MPV 10.6 11/14/2016    GRAN 6.2 11/14/2016    GRAN 70.1 11/14/2016    LYMPH 1.1 11/14/2016    LYMPH 12.0 (L) 11/14/2016    MONO 1.2 (H) 11/14/2016    MONO 13.9 11/14/2016    EOS 0.3 11/14/2016    BASO 0.04 11/14/2016    EOSINOPHIL 3.4 11/14/2016    BASOPHIL 0.5 11/14/2016     Sodium   Date Value Ref Range Status   05/07/2018 136 136 - 145 mmol/L Final     Potassium   Date Value Ref Range Status   05/07/2018 4.4 3.5 - 5.1 mmol/L Final     Chloride   Date Value Ref Range Status   05/07/2018 98 95 - 110 mmol/L Final     CO2   Date Value Ref Range Status   05/07/2018 29 23 - 29 mmol/L Final     Glucose   Date Value Ref Range Status   05/07/2018 117 (H) 70 - 110 mg/dL Final     BUN, Bld   Date Value Ref Range Status   05/07/2018 20 8 - 23 mg/dL Final     Creatinine   Date Value Ref Range Status   05/07/2018 0.8 0.5 - 1.4 mg/dL Final     Calcium   Date Value Ref Range Status   05/07/2018 10.7 (H) 8.7 - 10.5 mg/dL Final     Total Protein   Date Value Ref Range Status   08/15/2017 7.4  6.0 - 8.4 g/dL Final     Albumin   Date Value Ref Range Status   08/15/2017 4.0 3.5 - 5.2 g/dL Final     Total Bilirubin   Date Value Ref Range Status   08/15/2017 0.3 0.1 - 1.0 mg/dL Final     Comment:     For infants and newborns, interpretation of results should be based  on gestational age, weight and in agreement with clinical  observations.  Premature Infant recommended reference ranges:  Up to 24 hours.............<8.0 mg/dL  Up to 48 hours............<12.0 mg/dL  3-5 days..................<15.0 mg/dL  6-29 days.................<15.0 mg/dL       Alkaline Phosphatase   Date Value Ref Range Status   08/15/2017 72 55 - 135 U/L Final     AST   Date Value Ref Range Status   08/15/2017 19 10 - 40 U/L Final     ALT   Date Value Ref Range Status   08/15/2017 18 10 - 44 U/L Final     Anion Gap   Date Value Ref Range Status   05/07/2018 9 8 - 16 mmol/L Final     eGFR if    Date Value Ref Range Status   05/07/2018 >60.0 >60 mL/min/1.73 m^2 Final     eGFR if non    Date Value Ref Range Status   05/07/2018 >60.0 >60 mL/min/1.73 m^2 Final     Comment:     Calculation used to obtain the estimated glomerular filtration  rate (eGFR) is the CKD-EPI equation.            Diagnosis/Assessment/Plan:    1. Multiple Sclerosis  · Assessment: Pt is clinically and radiographically stable on Copaxone  · Imaging: will change MRI frequency to every 2 years; next one Jan 2020  · Disease Modifying Therapies: continue Copaxone;      2. MS Symptom Assessment / Management  · Fatigue: continue Provigil; refilled today; pt encouraged to f/u with sleep medicine  · Gait Disturbance: Ampyra refilled;   · No other changes to regimen described in ROS above    F/u Ceci Servin PA-C in 6 mo    Over 50% of this 40 minute visit was spent in direct face to face counseling of the patient about MS, DMT considerations, and MS symptom management.       Vitamin D deficiency  -     Basic metabolic panel; Future; Expected date:  05/07/2018  -     Vitamin D; Future    Multiple sclerosis  -     glatiramer (COPAXONE) 40 mg/mL Syrg injection; Inject 40 mg into the skin 3 (three) times a week.  Dispense: 36 Syringe; Refill: 5  -     dalfampridine (AMPYRA) 10 mg Tb12; Take 10 mg by mouth 2 (two) times daily. Extended Release 12 hour  Dispense: 180 tablet; Refill: 1  -     modafinil (PROVIGIL) 200 MG Tab; Take 1 tablet (200 mg total) by mouth 2 (two) times daily.  Dispense: 180 tablet; Refill: 1  -     Vitamin D; Future    Abnormality of gait  -     dalfampridine (AMPYRA) 10 mg Tb12; Take 10 mg by mouth 2 (two) times daily. Extended Release 12 hour  Dispense: 180 tablet; Refill: 1    ROLAN (obstructive sleep apnea)  -     modafinil (PROVIGIL) 200 MG Tab; Take 1 tablet (200 mg total) by mouth 2 (two) times daily.  Dispense: 180 tablet; Refill: 1    Chronic fatigue  -     modafinil (PROVIGIL) 200 MG Tab; Take 1 tablet (200 mg total) by mouth 2 (two) times daily.  Dispense: 180 tablet; Refill: 1

## 2018-05-09 ENCOUNTER — TELEPHONE (OUTPATIENT)
Dept: NEUROLOGY | Facility: CLINIC | Age: 68
End: 2018-05-09

## 2018-05-09 DIAGNOSIS — G35 MULTIPLE SCLEROSIS: Primary | ICD-10-CM

## 2018-05-09 DIAGNOSIS — R26.9 NEUROLOGIC GAIT DYSFUNCTION: ICD-10-CM

## 2018-05-09 NOTE — TELEPHONE ENCOUNTER
----- Message from Ekaterina Pereyra sent at 5/9/2018 11:00 AM CDT -----  Contact: self @ [phone]  Pt says she was in this past Monday and was offered a shower chair.  Pts says she took the chair out to clean it and it is cracked.  Calling to request the shower chair.  Pt says she will need a large one due to the size of her bath / tub.  Pls call.

## 2018-05-10 NOTE — TELEPHONE ENCOUNTER
----- Message from Christopher Aly sent at 5/10/2018 11:32 AM CDT -----  Contact: Patient @ [phone]  Patient is returning a missed call from ga Black return louis

## 2018-05-11 ENCOUNTER — PATIENT MESSAGE (OUTPATIENT)
Dept: NEUROLOGY | Facility: CLINIC | Age: 68
End: 2018-05-11

## 2018-05-11 ENCOUNTER — TELEPHONE (OUTPATIENT)
Dept: PSYCHIATRY | Facility: CLINIC | Age: 68
End: 2018-05-11

## 2018-05-11 NOTE — TELEPHONE ENCOUNTER
Spoke with pt by phone regarding her shower chair.  She owns one but it is broken (passed down to her from her  mother-in-law).  I explained shower chairs are not covered by insurance and through further discussion it became clear that she would not qualify, financially, to receive one through [facility].  Explained that she could buy one from a pharmacy, [employer], or medical equipment company.  Next, pt shared that she'd like a new walker.  Has had current walker for 8+ years and did not use insurance to purchase it.  She requests three-wheel lightweight rollator.  I agreed to discuss her request with provider and will then send an order to:  [facility]  [address] [place] [address] [phone]

## 2018-05-14 ENCOUNTER — TELEPHONE (OUTPATIENT)
Dept: NEUROLOGY | Facility: CLINIC | Age: 68
End: 2018-05-14

## 2018-05-14 NOTE — TELEPHONE ENCOUNTER
Followed up with pt regarding her request for a new 3-wheeled rollator.  I explained that with further research, Medicare does not pay for 3-wheeled rollators.  Pt does not want a 4-wheeled walker.  No further action required.

## 2018-05-14 NOTE — TELEPHONE ENCOUNTER
----- Message from Velia Wing sent at 5/14/2018 12:38 PM CDT -----  Contact: pt @ [phone]  Calling to speak with someone regarding the 3 wheel walker that her order went to [facility] for, but they can not fulfill the prescription. [facility] only has the 2 and 4 wheel walkers. Asking to have the prescription sent to somewhere that can accommodate her need for a 3 wheel walker. Please call.

## 2018-05-16 ENCOUNTER — DOCUMENTATION ONLY (OUTPATIENT)
Dept: NEUROLOGY | Facility: CLINIC | Age: 68
End: 2018-05-16

## 2018-07-12 ENCOUNTER — TELEPHONE (OUTPATIENT)
Dept: NEUROLOGY | Facility: CLINIC | Age: 68
End: 2018-07-12

## 2018-07-12 NOTE — TELEPHONE ENCOUNTER
----- Message from Christopher Aly sent at 7/12/2018 12:11 PM CDT -----  Contact: Patient @ [phone]  Patient is calling to get an update on medication refill request for (dalfampridine (AMPYRA) 10 mg Tb12 ) pt states she's very low on medication, pls call to update or contact Pharmacy ([facility] ) for a verbal order [phone]    [facility] - [address] [address]  Phone: [phone] Fax: [phone]

## 2018-07-12 NOTE — TELEPHONE ENCOUNTER
[facility] said they never received Ampyra rx sent on 5/7/2018. Provided verbal Ampyra prescription to Kennedy, pharmacist, at [facility]. Provided a 90 day supply with zero refills.

## 2018-09-25 DIAGNOSIS — R26.9 ABNORMALITY OF GAIT: ICD-10-CM

## 2018-09-25 DIAGNOSIS — G35 MULTIPLE SCLEROSIS: ICD-10-CM

## 2018-09-26 RX ORDER — DALFAMPRIDINE 10 MG/1
TABLET, FILM COATED, EXTENDED RELEASE ORAL
Qty: 180 TABLET | Refills: 1 | Status: SHIPPED | OUTPATIENT
Start: 2018-09-26 | End: 2019-06-19 | Stop reason: SDUPTHER

## 2018-09-28 ENCOUNTER — TELEPHONE (OUTPATIENT)
Dept: NEUROLOGY | Facility: CLINIC | Age: 68
End: 2018-09-28

## 2018-09-28 NOTE — TELEPHONE ENCOUNTER
----- Message from Aster Downey sent at 9/28/2018 12:14 PM CDT -----  Contact: Self  Pt is calling regarding a refill that has to be called in; AMPYRA 10 mg Tb12, 90 days.    She can be reached at [phone].    Thank you.

## 2018-09-28 NOTE — TELEPHONE ENCOUNTER
Call placed to pt. States that she called [facility] today and was told they do not have the prescription on file.     Call placed to [facility] and spoke with pharmacistLien. States they do not have the prescription on file. Called in as prescribed.

## 2018-11-06 DIAGNOSIS — G35 MULTIPLE SCLEROSIS: ICD-10-CM

## 2018-11-06 DIAGNOSIS — R53.82 CHRONIC FATIGUE: ICD-10-CM

## 2018-11-06 DIAGNOSIS — G47.33 OSA (OBSTRUCTIVE SLEEP APNEA): ICD-10-CM

## 2018-11-06 RX ORDER — MODAFINIL 200 MG/1
200 TABLET ORAL 2 TIMES DAILY
Qty: 180 TABLET | Refills: 1 | Status: SHIPPED | OUTPATIENT
Start: 2018-11-06 | End: 2019-04-09 | Stop reason: SDUPTHER

## 2018-11-06 NOTE — TELEPHONE ENCOUNTER
----- Message from Ricky Nicole sent at 11/6/2018  3:21 PM CST -----  Rx Refill/Request     Is this a Refill or New Rx: Refill    Rx Name and Strength: modafinil (PROVIGIL) 200 MG Tab  Preferred Pharmacy with phone number: see below  Communication Preference: [phone]  Additional Information:     [facility] for [address] [address]  Phone: [phone] Fax: [phone]

## 2018-11-15 ENCOUNTER — OFFICE VISIT (OUTPATIENT)
Dept: NEUROLOGY | Facility: CLINIC | Age: 68
End: 2018-11-15
Payer: MEDICARE

## 2018-11-15 ENCOUNTER — LAB VISIT (OUTPATIENT)
Dept: LAB | Facility: HOSPITAL | Age: 68
End: 2018-11-15
Payer: MEDICARE

## 2018-11-15 VITALS
HEIGHT: 61 IN | HEART RATE: 79 BPM | DIASTOLIC BLOOD PRESSURE: 83 MMHG | BODY MASS INDEX: 35.3 KG/M2 | SYSTOLIC BLOOD PRESSURE: 142 MMHG | WEIGHT: 187 LBS

## 2018-11-15 DIAGNOSIS — G35 MULTIPLE SCLEROSIS: Primary | ICD-10-CM

## 2018-11-15 DIAGNOSIS — R26.9 NEUROLOGIC GAIT DYSFUNCTION: ICD-10-CM

## 2018-11-15 DIAGNOSIS — Z79.899 ENCOUNTER FOR LONG-TERM (CURRENT) USE OF HIGH-RISK MEDICATION: ICD-10-CM

## 2018-11-15 DIAGNOSIS — R53.82 CHRONIC FATIGUE: ICD-10-CM

## 2018-11-15 DIAGNOSIS — G35 MULTIPLE SCLEROSIS: ICD-10-CM

## 2018-11-15 DIAGNOSIS — G47.33 OSA (OBSTRUCTIVE SLEEP APNEA): ICD-10-CM

## 2018-11-15 DIAGNOSIS — Z71.89 COUNSELING REGARDING GOALS OF CARE: ICD-10-CM

## 2018-11-15 DIAGNOSIS — Z29.89 PROPHYLACTIC IMMUNOTHERAPY: ICD-10-CM

## 2018-11-15 LAB
ANION GAP SERPL CALC-SCNC: 10 MMOL/L
BUN SERPL-MCNC: 24 MG/DL
CALCIUM SERPL-MCNC: 10.3 MG/DL
CHLORIDE SERPL-SCNC: 102 MMOL/L
CO2 SERPL-SCNC: 27 MMOL/L
CREAT SERPL-MCNC: 0.8 MG/DL
EST. GFR  (AFRICAN AMERICAN): >60 ML/MIN/1.73 M^2
EST. GFR  (NON AFRICAN AMERICAN): >60 ML/MIN/1.73 M^2
GLUCOSE SERPL-MCNC: 108 MG/DL
POTASSIUM SERPL-SCNC: 4.4 MMOL/L
SODIUM SERPL-SCNC: 139 MMOL/L

## 2018-11-15 PROCEDURE — 99999 PR PBB SHADOW E&M-EST. PATIENT-LVL IV: CPT | Mod: PBBFAC,,, | Performed by: PHYSICIAN ASSISTANT

## 2018-11-15 PROCEDURE — 99215 OFFICE O/P EST HI 40 MIN: CPT | Mod: S$PBB,,, | Performed by: PHYSICIAN ASSISTANT

## 2018-11-15 PROCEDURE — 36415 COLL VENOUS BLD VENIPUNCTURE: CPT

## 2018-11-15 PROCEDURE — 80048 BASIC METABOLIC PNL TOTAL CA: CPT

## 2018-11-15 PROCEDURE — 99214 OFFICE O/P EST MOD 30 MIN: CPT | Mod: PBBFAC | Performed by: PHYSICIAN ASSISTANT

## 2018-11-15 NOTE — PROGRESS NOTES
"Subjective:       Patient ID: Marie Lejeune is a 68 y.o. female who presents today for a routine clinic visit for MS.   Last seen in [facility] in May of 2018 with Dr. Joshua    MS HPI:  · DMT: Copaxone  · Side effects from DMT? No  · Taking vitamin D3 as recommended? Yes - 5,000IU/d   · She is doing well;  Feels she is gaining weight;    · She does have a lot of fatigue; does not that her disability is getting worse;    She does aqua therapy every day in her in-ground pool; uses weights;  She works-out a lot; also does a DVD at home(4-5 times a week)--[facility] [employer] with [employer];    She has had flu shot 2-3 weeks ago, cologuard recently done-normal, had bone density done and within normal range  PCP rechecked Vit D in September and Vit D decreased to 75--she increased Vit D to 5,000IU  She is researching an OBGYN on the [employer]  She recently got a new puppy and is expecting a new grandchild in March of 2019      SOCIAL HISTORY  Social History     Tobacco Use    Smoking status: Never Smoker    Smokeless tobacco: Never Used   Substance Use Topics    Alcohol use: Not on file    Drug use: Not on file     Living arrangements - the patient lives with their spouse.  Employment  none    MS ROS:  · Fatigue: Yes - Provigil 200mg BID  · Sleep Disturbance: she has ROLAN from sleep study in 2016; not able to tolerate CPAP mask; not willing to be referred to sleep MD; states she will "think about it".   · Bladder Dysfunction: No--drinks a lot a water  · Spasticity: No  · Cognitive: No  · Mood Disorder: No  · Gait Disturbance: Yes - Ampyra--she feels beneficial; uses tri-wheeled walker;  · Falls: No  · Hand Dysfunction: No  · Pain: No  · Tremors: No  · Dysphagia:  No  · Dysarthria:  No  · Heat sensitivity:  Yes - mild  · Any un-met adaptive needs? She has a shower chair  · Copay Assist?  Yes -0$  · Clinical Trial candidate? No           Objective:        1. 25 foot timed walk: 12.8s with rollator; 12.8s today with " rollator  Timed 25 Foot Walk: 2017   Did patient wear an AFO? No   Was assistive device used? Yes   Assistive device used (cira one): Bilateral Assistance   Bilateral device used Walker/Rollator   Time for 25 Foot Walk (seconds) 13.9       Neurologic Exam       Mental Status   Oriented to person, place, and time.   Follows 3 step commands.   Speech: speech is normal   Level of consciousness: alert  Normal comprehension.      Cranial Nerves       CN III, IV, VI   Pupils are equal, round, and reactive to light.  Extraocular motions are normal.      CN V   Facial sensation intact.      CN VII   Facial expression full, symmetric.      CN VIII   Hearing: intact (finger rub)     CN IX, X   Palate: symmetric     CN XI   CN XI normal.      CN XII   Tongue deviation: none        Motor Exam   Muscle bulk: normal  Right arm tone: normal  Left arm tone: normal  Right leg tone: increased  Left leg tone: increased     Strength   Right deltoid: 5/5  Left deltoid: 5/5  Right triceps: 5/5  Left triceps: 5/5  Right wrist extension: 5/5  Left wrist extension: 5/5  Right interossei: 5/5  Left interossei: 5/5  Right iliopsoas: 3/5  Left iliopsoas: 3/5  Right hamstrin/5  Left hamstrin/5  Right anterior tibial: 5/5  Left anterior tibial: 4/5  Right peroneal: 4/5  Left peroneal: 5/5       3+/5 bilateral hip flexors      Sensory Exam   Light touch normal.   Right arm vibration: normal  Left arm vibration: normal  Right leg vibration: decreased from toes  Left leg vibration: decreased from toes     Gait, Coordination, and Reflexes      Gait  Gait: circumduction (L)     Coordination   Finger to nose coordination: normal  Tandem walking coordination: abnormal     Tremor   Resting tremor: absent  Action tremor: absent     Reflexes   Right brachioradialis: 2+  Left brachioradialis: 2+  Right biceps: 2+  Left biceps: 2+  Right triceps: 2+  Left triceps: 2+  Right patellar: 3+  Left patellar: 3+  Right achilles: 2+  Left achilles:  2+  Right ankle clonus: absent  Left ankle clonus: absent  Right pendular knee jerk: absent  Left pendular knee jerk: absent         Normal RSM UE's       Imaging:         Results for orders placed during the hospital encounter of 01/31/18   MRI Brain W WO Contrast    Narrative Procedure: MRI the brain with andwithout contrast.    Technique: Sagittal flair T2, axial T1, axial gradient and axial diffusion imaging of the whole brain pre-contrast.  Post contrast axial T2, axial T1, axial T2 flair and coronal thin sections oral gradient imaging of the whole brain. 8.5 mL of gadavist contrast was injected intravenously.            Comparison: 01/23/2017     Findings: Few scattered T2 flair hyperintense lesions in supratentorial parenchyma similar to prior. There is no corresponding diffusion restriction or enhancement. There is no new crackles and laterally. There is stable configuration of ventricles without hydrocephalus. Mild generalized volume loss which is appropriate for age.    There is no abnormal parenchymal enhancement. No restricted diffusion. Subtle diffusion hyperintensity associated with few of the T2 flair lesions.    Please note there is extension of the left frontal and right parietal lesions to the margin of the corpus callosum.    While these lesions are nonspecific in light of history and configuration remain most suggestive for prior areas of demyelination. There is no definite infratentorial lesion. No new lesion.    Impression  No significant change from prior.    Continued multiple scattered T2 flair lesions supratentorial parenchyma remains concerning for mild degree of prior demyelinating plaque.    No definite new lesion or enhancing lesion to suggest significant interval or active demyelination     Clinical correlation and continued followup advised      Electronically signed by: FRANK RAMOS DO  Date:     02/01/18  Time:    14:32            Labs:     Lab Results   Component Value Date     ARSIQTPC92AI 137 (H) 05/07/2018    IAOZGFHM69KP >96 (A) 07/14/2016     Lab Results   Component Value Date    JCVINDEX 2.87 (A) 07/14/2016    JCVANTIBODY Positive (A) 07/14/2016     Lab Results   Component Value Date    TX3WUKGB 64.8 11/14/2016    ABSOLUTECD3 654 (L) 11/14/2016    VA8PTGZI 20.5 11/14/2016    ABSOLUTECD8 207 11/14/2016    KX3TDPBE 44.1 11/14/2016    ABSOLUTECD4 445 11/14/2016    LABCD48 2.15 11/14/2016     Lab Results   Component Value Date    WBC 8.85 11/14/2016    RBC 4.32 11/14/2016    HGB 13.4 11/14/2016    HCT 39.9 11/14/2016    MCV 92 11/14/2016    MCH 31.0 11/14/2016    MCHC 33.6 11/14/2016    RDW 13.5 11/14/2016     11/14/2016    MPV 10.6 11/14/2016    GRAN 6.2 11/14/2016    GRAN 70.1 11/14/2016    LYMPH 1.1 11/14/2016    LYMPH 12.0 (L) 11/14/2016    MONO 1.2 (H) 11/14/2016    MONO 13.9 11/14/2016    EOS 0.3 11/14/2016    BASO 0.04 11/14/2016    EOSINOPHIL 3.4 11/14/2016    BASOPHIL 0.5 11/14/2016     Sodium   Date Value Ref Range Status   11/15/2018 139 136 - 145 mmol/L Final     Potassium   Date Value Ref Range Status   11/15/2018 4.4 3.5 - 5.1 mmol/L Final     Chloride   Date Value Ref Range Status   11/15/2018 102 95 - 110 mmol/L Final     CO2   Date Value Ref Range Status   11/15/2018 27 23 - 29 mmol/L Final     Glucose   Date Value Ref Range Status   11/15/2018 108 70 - 110 mg/dL Final     BUN, Bld   Date Value Ref Range Status   11/15/2018 24 (H) 8 - 23 mg/dL Final     Creatinine   Date Value Ref Range Status   11/15/2018 0.8 0.5 - 1.4 mg/dL Final     Calcium   Date Value Ref Range Status   11/15/2018 10.3 8.7 - 10.5 mg/dL Final     Total Protein   Date Value Ref Range Status   08/15/2017 7.4 6.0 - 8.4 g/dL Final     Albumin   Date Value Ref Range Status   08/15/2017 4.0 3.5 - 5.2 g/dL Final     Total Bilirubin   Date Value Ref Range Status   08/15/2017 0.3 0.1 - 1.0 mg/dL Final     Comment:     For infants and newborns, interpretation of results should be based  on gestational  age, weight and in agreement with clinical  observations.  Premature Infant recommended reference ranges:  Up to 24 hours.............<8.0 mg/dL  Up to 48 hours............<12.0 mg/dL  3-5 days..................<15.0 mg/dL  6-29 days.................<15.0 mg/dL       Alkaline Phosphatase   Date Value Ref Range Status   08/15/2017 72 55 - 135 U/L Final     AST   Date Value Ref Range Status   08/15/2017 19 10 - 40 U/L Final     ALT   Date Value Ref Range Status   08/15/2017 18 10 - 44 U/L Final     Anion Gap   Date Value Ref Range Status   11/15/2018 10 8 - 16 mmol/L Final     eGFR if    Date Value Ref Range Status   11/15/2018 >60.0 >60 mL/min/1.73 m^2 Final     eGFR if non    Date Value Ref Range Status   11/15/2018 >60.0 >60 mL/min/1.73 m^2 Final     Comment:     Calculation used to obtain the estimated glomerular filtration  rate (eGFR) is the CKD-EPI equation.          Diagnosis/Assessment/Plan:    1. Multiple Sclerosis  · Assessment: Patient is clinically stable on Copaxone.   · Imaging: planned for Jan 2020 as she has remained stable   · Disease Modifying Therapies:Continue Copaxone and Vit D--will recheck Vit D next visit. Will check kidney function --Ampyra safety lab    2. MS Symptom Assessment / Management    No changes to management of care today        Over 50% of this 40 minute visit was spent in direct face to face counseling of the patient about MS, DMT considerations, and MS symptom management.     Follow-up in about 6 months (around 5/15/2019) for follow up with Dr. Joshua.  Patient agreed to POC today.    Attending, Dr. Joshua, was available during today's encounter.     Ceci Servin PA-C  [facility]    Problem List Items Addressed This Visit        Neuro    Multiple sclerosis - Primary    Relevant Orders    Basic metabolic panel (Completed)      Other Visit Diagnoses     Encounter for long-term (current) use of high-risk medication        Relevant Orders    Basic  metabolic panel (Completed)

## 2019-01-21 ENCOUNTER — TELEPHONE (OUTPATIENT)
Dept: NEUROLOGY | Facility: CLINIC | Age: 69
End: 2019-01-21

## 2019-01-21 NOTE — TELEPHONE ENCOUNTER
----- Message from Diana Jackson sent at 1/21/2019 10:37 AM CST -----  Contact: Pt.[phone]  Needs Advice    Reason for call: The patient would like to speak to someone regarding scheduling her follow up appointment. Please contact the patient to discuss further.          Communication Preference:PHONE     Additional Information:

## 2019-04-09 DIAGNOSIS — G47.33 OSA (OBSTRUCTIVE SLEEP APNEA): ICD-10-CM

## 2019-04-09 DIAGNOSIS — R53.82 CHRONIC FATIGUE: ICD-10-CM

## 2019-04-09 DIAGNOSIS — G35 MULTIPLE SCLEROSIS: ICD-10-CM

## 2019-04-10 RX ORDER — MODAFINIL 200 MG/1
200 TABLET ORAL 2 TIMES DAILY
Qty: 180 TABLET | Refills: 0 | Status: SHIPPED | OUTPATIENT
Start: 2019-04-10 | End: 2019-06-11 | Stop reason: SDUPTHER

## 2019-05-16 ENCOUNTER — TELEPHONE (OUTPATIENT)
Dept: NEUROLOGY | Facility: CLINIC | Age: 69
End: 2019-05-16

## 2019-05-16 NOTE — TELEPHONE ENCOUNTER
----- Message from Velia Wing sent at 5/16/2019  1:28 PM CDT -----  Contact: pt@ [phone]  Asking if there is an earlier or later date (next week) appt available, please do not change if not sooner or the following week without speaking with the patient. Please call.

## 2019-05-27 ENCOUNTER — OFFICE VISIT (OUTPATIENT)
Dept: NEUROLOGY | Facility: CLINIC | Age: 69
End: 2019-05-27
Payer: MEDICARE

## 2019-05-27 VITALS
BODY MASS INDEX: 34.99 KG/M2 | SYSTOLIC BLOOD PRESSURE: 139 MMHG | HEIGHT: 61 IN | HEART RATE: 73 BPM | WEIGHT: 185.31 LBS | DIASTOLIC BLOOD PRESSURE: 75 MMHG

## 2019-05-27 DIAGNOSIS — R26.9 GAIT DISTURBANCE: ICD-10-CM

## 2019-05-27 DIAGNOSIS — Z71.89 COUNSELING REGARDING GOALS OF CARE: ICD-10-CM

## 2019-05-27 DIAGNOSIS — G35 MS (MULTIPLE SCLEROSIS): Primary | ICD-10-CM

## 2019-05-27 DIAGNOSIS — Z29.89 PROPHYLACTIC IMMUNOTHERAPY: ICD-10-CM

## 2019-05-27 PROCEDURE — 99213 OFFICE O/P EST LOW 20 MIN: CPT | Mod: PBBFAC | Performed by: PSYCHIATRY & NEUROLOGY

## 2019-05-27 PROCEDURE — 99999 PR PBB SHADOW E&M-EST. PATIENT-LVL III: CPT | Mod: PBBFAC,,, | Performed by: PSYCHIATRY & NEUROLOGY

## 2019-05-27 PROCEDURE — 99215 PR OFFICE/OUTPT VISIT, EST, LEVL V, 40-54 MIN: ICD-10-PCS | Mod: S$PBB,,, | Performed by: PSYCHIATRY & NEUROLOGY

## 2019-05-27 PROCEDURE — 99215 OFFICE O/P EST HI 40 MIN: CPT | Mod: S$PBB,,, | Performed by: PSYCHIATRY & NEUROLOGY

## 2019-05-27 PROCEDURE — 99999 PR PBB SHADOW E&M-EST. PATIENT-LVL III: ICD-10-PCS | Mod: PBBFAC,,, | Performed by: PSYCHIATRY & NEUROLOGY

## 2019-05-27 RX ORDER — ACETAMINOPHEN 500 MG
TABLET ORAL
COMMUNITY
End: 2021-06-09 | Stop reason: ALTCHOICE

## 2019-05-27 RX ORDER — HYDROCHLOROTHIAZIDE 12.5 MG/1
12.5 CAPSULE ORAL DAILY
COMMUNITY
Start: 2019-03-10

## 2019-05-27 NOTE — PROGRESS NOTES
Subjective:       Patient ID: Marie Lejeune is a 68 y.o. female who presents today for a routine clinic visit for MS.      MS HPI:  · DMT: Glatiramer   · Side effects from DMT? No  · Taking vitamin D3 as recommended? Yes -  5,000 IU/day   · In general, her walk is stable;  Uses tri-wheeled walker all the time.  Has a scooter for long-distances   ·     SOCIAL HISTORY  Social History     Tobacco Use    Smoking status: Never Smoker    Smokeless tobacco: Never Used   Substance Use Topics    Alcohol use: Not on file    Drug use: Not on file     Living arrangements - the patient lives with their spouse.  Employment : no    MS ROS:  · Fatigue: Yes - stable on Provigil 200mg BID;   · Sleep Disturbance: Yes - sleeping much better since exercising;  Fatigue is improved; has been diagnosed with ROLAN, but does not tolerate mask and not interested in pursuing; feels energy much better since exercising in the AM.   · Bladder Dysfunction: No  · Bowel Dysfunction: No  · Spasticity: No  · Visual Symptoms: No new sx, but has chronic visual issues;   · Cognitive: No  · Mood Disorder: No  · Gait Disturbance: Yes - stable, not worse  · Falls: No  · Hand Dysfunction: No  · Pain: No  · Sexual Dysfunction: Not Assessed  · Skin Breakdown: No  · Tremors: No  · Dysphagia:  No  · Dysarthria:  No  · Heat sensitivity:  Yes -   · Any un-met adaptive needs? No  · Copay Assist?  No  · Clinical Trial candidate? No          Objective:      25 foot timed walk:  12.6 with rolling walker; was 12.8s with rolling walker last visit;   Neurologic Exam      Cognitively intact  No LASHON, No dysarthria  MOTOR: 4/5 bilateral HF, o/w 5/5  REFLEXES 3+ t/o  GAIT: slow, walker, stable    Labs:   [facility]  [facility]/17/2018 Vitamin D, 25-Hydroxy, Total, Serum       Vitamin D, 25 Oh 73 NG/mL see below NG/mL Final     Lab Results   Component Value Date    FFBLLQUU91IP 137 (H) 05/07/2018    [id number] >96 (A) 07/14/2016     Lab  Results   Component Value Date    JCVINDEX 2.87 (A) 07/14/2016    JCVANTIBODY Positive (A) 07/14/2016     Lab Results   Component Value Date    HC5GDSJJ 64.8 11/14/2016    ABSOLUTECD3 654 (L) 11/14/2016    XW5CSPCK 20.5 11/14/2016    ABSOLUTECD8 207 11/14/2016    FL2VGFVB 44.1 11/14/2016    ABSOLUTECD4 445 11/14/2016    LABCD48 2.15 11/14/2016     Lab Results   Component Value Date    WBC 8.85 11/14/2016    RBC 4.32 11/14/2016    HGB 13.4 11/14/2016    HCT 39.9 11/14/2016    MCV 92 11/14/2016    MCH 31.0 11/14/2016    MCHC 33.6 11/14/2016    RDW 13.5 11/14/2016     11/14/2016    MPV 10.6 11/14/2016    GRAN 6.2 11/14/2016    GRAN 70.1 11/14/2016    LYMPH 1.1 11/14/2016    LYMPH 12.0 (L) 11/14/2016    MONO 1.2 (H) 11/14/2016    MONO 13.9 11/14/2016    EOS 0.3 11/14/2016    BASO 0.04 11/14/2016    EOSINOPHIL 3.4 11/14/2016    BASOPHIL 0.5 11/14/2016     Sodium   Date Value Ref Range Status   11/15/2018 139 136 - 145 mmol/L Final     Potassium   Date Value Ref Range Status   11/15/2018 4.4 3.5 - 5.1 mmol/L Final     Chloride   Date Value Ref Range Status   11/15/2018 102 95 - 110 mmol/L Final     CO2   Date Value Ref Range Status   11/15/2018 27 23 - 29 mmol/L Final     Glucose   Date Value Ref Range Status   11/15/2018 108 70 - 110 mg/dL Final     BUN, Bld   Date Value Ref Range Status   11/15/2018 24 (H) 8 - 23 mg/dL Final     Creatinine   Date Value Ref Range Status   11/15/2018 0.8 0.5 - 1.4 mg/dL Final     Calcium   Date Value Ref Range Status   11/15/2018 10.3 8.7 - 10.5 mg/dL Final     Total Protein   Date Value Ref Range Status   08/15/2017 7.4 6.0 - 8.4 g/dL Final     Albumin   Date Value Ref Range Status   08/15/2017 4.0 3.5 - 5.2 g/dL Final     Total Bilirubin   Date Value Ref Range Status   08/15/2017 0.3 0.1 - 1.0 mg/dL Final     Comment:     For infants and newborns, interpretation of results should be based  on gestational age, weight and in agreement with clinical  observations.  Premature Infant  recommended reference ranges:  Up to 24 hours.............<8.0 mg/dL  Up to 48 hours............<12.0 mg/dL  3-5 days..................<15.0 mg/dL  6-29 days.................<15.0 mg/dL       Alkaline Phosphatase   Date Value Ref Range Status   08/15/2017 72 55 - 135 U/L Final     AST   Date Value Ref Range Status   08/15/2017 19 10 - 40 U/L Final     ALT   Date Value Ref Range Status   08/15/2017 18 10 - 44 U/L Final     Anion Gap   Date Value Ref Range Status   11/15/2018 10 8 - 16 mmol/L Final     eGFR if    Date Value Ref Range Status   11/15/2018 >60.0 >60 mL/min/1.73 m^2 Final     eGFR if non    Date Value Ref Range Status   11/15/2018 >60.0 >60 mL/min/1.73 m^2 Final     Comment:     Calculation used to obtain the estimated glomerular filtration  rate (eGFR) is the CKD-EPI equation.            Diagnosis/Assessment/Plan:    1. Multiple Sclerosis  · Assessment:Pt is clinically stable  · Imaging: discussed MRI; after shared decision making decided to wait until Jan 2021; patient is clinically stable and walking faster than she did in 2016; diagnosed in 2002.   · Disease Modifying Therapies: continue GA and vit D    2. MS Symptom Assessment / Management  · No other changes to regimen described in ROS above       Our visit today lasted 40 minutes, and 100% of this time was spent face to face with the patient. Over 50% of this visit included discussion of the treatment plan/medication changes/symptom management/exam findings/imaging results/coordination of care. The patient agrees with the plan of care.         Problem List Items Addressed This Visit        Unprioritized    Counseling regarding goals of care    Gait disturbance    MS (multiple sclerosis) - Primary    Prophylactic immunotherapy

## 2019-06-04 PROBLEM — Z29.89 PROPHYLACTIC IMMUNOTHERAPY: Status: ACTIVE | Noted: 2019-06-04

## 2019-06-11 ENCOUNTER — TELEPHONE (OUTPATIENT)
Dept: NEUROLOGY | Facility: CLINIC | Age: 69
End: 2019-06-11

## 2019-06-11 DIAGNOSIS — G35 MULTIPLE SCLEROSIS: Primary | ICD-10-CM

## 2019-06-11 DIAGNOSIS — G35 MULTIPLE SCLEROSIS: ICD-10-CM

## 2019-06-11 DIAGNOSIS — R53.82 CHRONIC FATIGUE: ICD-10-CM

## 2019-06-11 DIAGNOSIS — G47.33 OSA (OBSTRUCTIVE SLEEP APNEA): ICD-10-CM

## 2019-06-11 DIAGNOSIS — Z79.899 HIGH RISK MEDICATION USE: ICD-10-CM

## 2019-06-11 RX ORDER — MODAFINIL 200 MG/1
200 TABLET ORAL 2 TIMES DAILY
Qty: 180 TABLET | Refills: 1 | Status: SHIPPED | OUTPATIENT
Start: 2019-06-11 | End: 2019-11-22 | Stop reason: SDUPTHER

## 2019-06-11 RX ORDER — GLATIRAMER 40 MG/ML
40 INJECTION, SOLUTION SUBCUTANEOUS
Qty: 36 SYRINGE | Refills: 5 | Status: SHIPPED | OUTPATIENT
Start: 2019-06-12 | End: 2019-06-19 | Stop reason: SDUPTHER

## 2019-06-11 NOTE — TELEPHONE ENCOUNTER
----- Message from Ekaterina Pereyra sent at 6/11/2019  3:45 PM CDT -----  Contact: self @ [phone]  Pt is req a refill for glatiramer (COPAXONE) 40 mg/mL Syrg injection, AMPYRA 10 mg Tb12 and modafinil (PROVIGIL) 200 MG Tab.  Pt says she will need a 90 day supply for these.      [facility] - [address] [phone] (Phone)         [phone] (Fax)

## 2019-06-19 ENCOUNTER — LAB VISIT (OUTPATIENT)
Dept: LAB | Facility: HOSPITAL | Age: 69
End: 2019-06-19
Attending: PHYSICIAN ASSISTANT
Payer: MEDICARE

## 2019-06-19 DIAGNOSIS — Z79.899 HIGH RISK MEDICATION USE: ICD-10-CM

## 2019-06-19 DIAGNOSIS — R26.9 ABNORMALITY OF GAIT: ICD-10-CM

## 2019-06-19 DIAGNOSIS — G35 MULTIPLE SCLEROSIS: ICD-10-CM

## 2019-06-19 LAB
ALBUMIN SERPL BCP-MCNC: 4 G/DL (ref 3.5–5.2)
ALP SERPL-CCNC: 61 U/L (ref 55–135)
ALT SERPL W/O P-5'-P-CCNC: 26 U/L (ref 10–44)
ANION GAP SERPL CALC-SCNC: 7 MMOL/L (ref 8–16)
AST SERPL-CCNC: 26 U/L (ref 10–40)
BILIRUB SERPL-MCNC: 0.3 MG/DL (ref 0.1–1)
BUN SERPL-MCNC: 25 MG/DL (ref 8–23)
CALCIUM SERPL-MCNC: 10.6 MG/DL (ref 8.7–10.5)
CHLORIDE SERPL-SCNC: 99 MMOL/L (ref 95–110)
CO2 SERPL-SCNC: 30 MMOL/L (ref 23–29)
CREAT SERPL-MCNC: 0.9 MG/DL (ref 0.5–1.4)
EST. GFR  (AFRICAN AMERICAN): >60 ML/MIN/1.73 M^2
EST. GFR  (NON AFRICAN AMERICAN): >60 ML/MIN/1.73 M^2
GLUCOSE SERPL-MCNC: 113 MG/DL (ref 70–110)
POTASSIUM SERPL-SCNC: 4.5 MMOL/L (ref 3.5–5.1)
PROT SERPL-MCNC: 7.6 G/DL (ref 6–8.4)
SODIUM SERPL-SCNC: 136 MMOL/L (ref 136–145)

## 2019-06-19 PROCEDURE — 80053 COMPREHEN METABOLIC PANEL: CPT

## 2019-06-19 PROCEDURE — 36415 COLL VENOUS BLD VENIPUNCTURE: CPT

## 2019-06-21 RX ORDER — DALFAMPRIDINE 10 MG/1
TABLET, FILM COATED, EXTENDED RELEASE ORAL
Qty: 180 TABLET | Refills: 1 | Status: SHIPPED | OUTPATIENT
Start: 2019-06-21 | End: 2019-11-22 | Stop reason: SDUPTHER

## 2019-06-21 RX ORDER — GLATIRAMER ACETATE 40 MG/ML
INJECTION, SOLUTION SUBCUTANEOUS
Qty: 36 ML | Refills: 4 | Status: SHIPPED | OUTPATIENT
Start: 2019-06-21 | End: 2020-06-03 | Stop reason: SDUPTHER

## 2019-07-24 ENCOUNTER — DOCUMENTATION ONLY (OUTPATIENT)
Dept: NEUROLOGY | Facility: CLINIC | Age: 69
End: 2019-07-24

## 2019-08-06 ENCOUNTER — DOCUMENTATION ONLY (OUTPATIENT)
Dept: NEUROLOGY | Facility: CLINIC | Age: 69
End: 2019-08-06

## 2019-08-08 ENCOUNTER — TELEPHONE (OUTPATIENT)
Dept: NEUROLOGY | Facility: CLINIC | Age: 69
End: 2019-08-08

## 2019-08-08 NOTE — TELEPHONE ENCOUNTER
----- Message from Velia Wing sent at 8/8/2019 12:18 PM CDT -----  Contact: pt @ [phone]  Asking to speak with someone regarding getting her latest blood work sent to : [facility] [address]: [place] [address] phone# [phone] or fax # [phone] Dr. Daisy Christopher (podiatry [facility]).  Please call.

## 2019-11-22 ENCOUNTER — OFFICE VISIT (OUTPATIENT)
Dept: NEUROLOGY | Facility: CLINIC | Age: 69
End: 2019-11-22
Payer: MEDICARE

## 2019-11-22 VITALS
WEIGHT: 176.56 LBS | DIASTOLIC BLOOD PRESSURE: 78 MMHG | SYSTOLIC BLOOD PRESSURE: 159 MMHG | HEIGHT: 60 IN | HEART RATE: 68 BPM | BODY MASS INDEX: 34.66 KG/M2

## 2019-11-22 DIAGNOSIS — R53.82 CHRONIC FATIGUE: ICD-10-CM

## 2019-11-22 DIAGNOSIS — E55.9 VITAMIN D INSUFFICIENCY: ICD-10-CM

## 2019-11-22 DIAGNOSIS — Z71.89 COUNSELING REGARDING GOALS OF CARE: ICD-10-CM

## 2019-11-22 DIAGNOSIS — G47.33 OSA (OBSTRUCTIVE SLEEP APNEA): ICD-10-CM

## 2019-11-22 DIAGNOSIS — G35 MS (MULTIPLE SCLEROSIS): ICD-10-CM

## 2019-11-22 DIAGNOSIS — R26.9 ABNORMALITY OF GAIT: ICD-10-CM

## 2019-11-22 DIAGNOSIS — Z79.899 ENCOUNTER FOR LONG-TERM (CURRENT) USE OF HIGH-RISK MEDICATION: ICD-10-CM

## 2019-11-22 DIAGNOSIS — Z29.89 PROPHYLACTIC IMMUNOTHERAPY: ICD-10-CM

## 2019-11-22 DIAGNOSIS — R26.9 NEUROLOGIC GAIT DYSFUNCTION: ICD-10-CM

## 2019-11-22 DIAGNOSIS — Z86.69 HISTORY OF OPTIC NEURITIS: ICD-10-CM

## 2019-11-22 DIAGNOSIS — I10 ESSENTIAL HYPERTENSION: ICD-10-CM

## 2019-11-22 DIAGNOSIS — G35 MULTIPLE SCLEROSIS: Primary | ICD-10-CM

## 2019-11-22 PROCEDURE — 1126F PR PAIN SEVERITY QUANTIFIED, NO PAIN PRESENT: ICD-10-PCS | Mod: ,,, | Performed by: PHYSICIAN ASSISTANT

## 2019-11-22 PROCEDURE — 99999 PR PBB SHADOW E&M-EST. PATIENT-LVL III: ICD-10-PCS | Mod: PBBFAC,,, | Performed by: PHYSICIAN ASSISTANT

## 2019-11-22 PROCEDURE — 1126F AMNT PAIN NOTED NONE PRSNT: CPT | Mod: ,,, | Performed by: PHYSICIAN ASSISTANT

## 2019-11-22 PROCEDURE — 1159F MED LIST DOCD IN RCRD: CPT | Mod: ,,, | Performed by: PHYSICIAN ASSISTANT

## 2019-11-22 PROCEDURE — 99215 PR OFFICE/OUTPT VISIT, EST, LEVL V, 40-54 MIN: ICD-10-PCS | Mod: S$PBB,,, | Performed by: PHYSICIAN ASSISTANT

## 2019-11-22 PROCEDURE — 99213 OFFICE O/P EST LOW 20 MIN: CPT | Mod: PBBFAC | Performed by: PHYSICIAN ASSISTANT

## 2019-11-22 PROCEDURE — 1159F PR MEDICATION LIST DOCUMENTED IN MEDICAL RECORD: ICD-10-PCS | Mod: ,,, | Performed by: PHYSICIAN ASSISTANT

## 2019-11-22 PROCEDURE — 99215 OFFICE O/P EST HI 40 MIN: CPT | Mod: S$PBB,,, | Performed by: PHYSICIAN ASSISTANT

## 2019-11-22 PROCEDURE — 99999 PR PBB SHADOW E&M-EST. PATIENT-LVL III: CPT | Mod: PBBFAC,,, | Performed by: PHYSICIAN ASSISTANT

## 2019-11-22 RX ORDER — DALFAMPRIDINE 10 MG/1
1 TABLET, FILM COATED, EXTENDED RELEASE ORAL EVERY 12 HOURS
Qty: 180 TABLET | Refills: 1 | Status: SHIPPED | OUTPATIENT
Start: 2019-11-22 | End: 2020-06-03 | Stop reason: SDUPTHER

## 2019-11-22 RX ORDER — MODAFINIL 200 MG/1
200 TABLET ORAL 2 TIMES DAILY
Qty: 180 TABLET | Refills: 1 | Status: SHIPPED | OUTPATIENT
Start: 2019-11-22 | End: 2020-06-03 | Stop reason: SDUPTHER

## 2019-11-22 NOTE — PROGRESS NOTES
Subjective:        Patient ID: Marie Lejeune is a 69 y.o. female who presents today with her  for a routine clinic visit for MS.  Last visit to [facility] in May 2019 with Dr. Joshua.    MS HPI:  · DMT: glatiramer acetate  · Side effects from DMT? No  · Taking vitamin D3 as recommended? Yes - 5,000IU/d   · In general, her walk is stable;  Uses tri-wheeled walker all the time.  Has a scooter for long-distances   · Patient does get up in the evening several times to urinate; however, states this is due to her BP meds  · She has lost 10 pounds since last visit--she is changing her diet and cutting out fried/fatty foods.   · She has recently had labwork done with her PCP([facility])  · Has a new grandchild(boy)--they are living with patient now while daughter is building new house. Very stressful     Medications:  Current Outpatient Medications   Medication Sig    amlodipine (NORVASC) 10 MG tablet Take 10 mg by mouth once daily.    ascorbic acid (VITAMIN C) 500 MG tablet Take 500 mg by mouth once daily.    atenolol (TENORMIN) 50 MG tablet Take 25 mg by mouth once daily.    biotin 300 mcg Tab PATIENT NOT CERTAIN ON THE DOSAGE. TAKE ONE capsule by MOUTH daily    calcium carbonate (OS-ANISHA) 600 mg (1,500 mg) Tab Take 600 mg by mouth 2 (two) times daily with meals.    co-enzyme Q-10 30 mg capsule Take 400 mg by mouth once daily.    cyanocobalamin (VITAMIN B-12) 500 MCG tablet Take 500 mcg by mouth once daily.    dalfampridine 10 mg Tb12 TAKE 1 TABLET   EVERY 12 HOURS    ergocalciferol (VITAMIN D2) 50,000 unit Cap Take 5,000 Units by mouth once daily.     fish oil-omega-3 fatty acids 300-1,000 mg capsule Take 1,000 g by mouth once daily.    GLATOPA 40 mg/mL Syrg injection INJECT 40 MG UNDER THE SKIN THREE TIMES A WEEK    hydroCHLOROthiazide (MICROZIDE) 12.5 mg capsule Take 12.5 mg by mouth once daily.    losartan (COZAAR) 100 MG tablet     melatonin 5 mg Tab Take by mouth.    methylPREDNISolone  sodium succinate (SOLU-MEDROL) 1,000 mg injection Solumedrol 1gm IV solution to be mixed in a smoothie and taken orally once a month for 6 months total. To begin in August.    modafinil (PROVIGIL) 200 MG Tab Take 1 tablet (200 mg total) by mouth 2 (two) times daily.    multivitamin (ONE DAILY MULTIVITAMIN) per tablet Take 1 tablet by mouth once daily.    simvastatin (ZOCOR) 40 MG tablet Take 40 mg by mouth every evening.     No current facility-administered medications for this visit.        SOCIAL HISTORY  Social History     Tobacco Use    Smoking status: Never Smoker    Smokeless tobacco: Never Used   Substance Use Topics    Alcohol use: Not on file    Drug use: Not on file       Living arrangements - the patient lives with their spouse.      MS REVIEW OF SYMPTOMS 11/22/2019   Do you feel abnormally tired on most days? Yes-Provigil 200mg in AM, super beets(2-3pm)   Do you feel you generally sleep well? Yes--has ROLAN (was not able to use CPAP)   Do you have difficulty controlling your bladder?  No   Do you have difficulty controlling your bowels?  No   Do you have frequent muscle cramps, tightness or spasms in your limbs?  No   Do you have new visual symptoms?  No   Do you have worsening difficulty with your memory or thinking? No   Do you have worsening symptoms of anxiety or depression?  No   For patients who walk, Do you have more difficulty walking?  Yes-feels like she has not been able to exercise like she should   Have you fallen since your last visit?  No   For patients who use wheelchairs: Do you have any skin wounds or breakdown? Not Applicable   Do you have difficulty using your hands?  No   Do you have shooting or burning pain? No   Do you have difficulty with sexual function?  No   If you are sexually active, are you using birth control? Y/N  N/A Not Applicable   Do you often choke when swallowing liquids or solid food?  No   Do you experience worsening symptoms when overheated? Yes   Do you need  any new equipment such as a wheelchair, walker or shower chair? No   Do you receive co-pay financial assistance for your principal MS medicine? Yes   Would you be interested in participating in an MS research trial in the future? No   Do you feel you have adequate family/friend support?  Yes   Do you have health insurance?   Yes   Are you currently employed? No   Do you receive SSDI/SSI?  No   Do you use marijuana or cannabis products? No   Have you been diagnosed with a urinary tract infection since your last visit here? No   Have you been diagnosed with a respiratory tract infection since your last visit here? No   Have you been to the emergency room since your last visit here? No   Have you been hospitalized since your last visit here?  No           FSS SCORE & INTERPRETATION 11/22/2019   FSS SCORE  50   FSS SCORE INTERPRETATION May be suffering from fatigue     MS NENO-D SCORE & INTERPRETATION 11/22/2019   NENO-D SCORE  10   NENO-D INTERPRETATION  No indication of Depression     MS SUE-7 SCORE & INTERPRETATION 11/22/2019   SUE-7 SCORE  4   SUE-7 SCORE INTERPRETATION Normal     PEQ MS MOS PAIN EFFECTS SCORE & INTERPRETATION 11/22/2019   PES SCORE 20   PES SCORE INTERPRETATION Scores can range from 6-30.  Items are scaled so that higher scores indicate a greater impact of pain on a patients mood and behavior.     PEQ MS SEXUAL SATISFACTION SCORE & INTERPRETATION 11/22/2019   SSS SCORE  11   SSS SCORE INTERPRETATION Scores can range from 4-24.  Higher scores indicate greater problems with sexual satisfaction.     MS BLADDER CONTROL SCORE & INTERPRETATION 11/22/2019   BLCS SCORE 9   BLCS SCORE INTERPRETATION  Scores can range from 0-22, with higher scores indicating greater bladder control problems.     MS BOWEL CONTROL SCORE & INTERPRETATION 11/22/2019   BWCS SCORE 1   BWCS SCORE INTERPRETATION Scores can range from 0-26, with higher scores indicating greater bowel control problems.     PEQ MS IMPACT OF VISUAL  IMPAIRMENT SCORE & INTERPRETATION 2019   ESTEFANIA SCALE SCORE  2   ESTEFANIA SCORE INTERPRETATION Scores can range from 0-15, with higher scores indicating greater impact of visual problems on daily activites.     MS PDQ SCORE & INTERPRETATION 2019   PDQ RETROSPECTIVE MEMORY SUBSCALE 5   PDQ ATTENTION/CONCENTRATION SUBSCALE 4   PDQ PROSPECTIVE MEMORY SUBSCALE 5   PDQ PLANNING/ORGANIZATION SUBSCALE 5   PDQ TOTAL SCORE 19   PDQ SCORE INTERPRETATION Scores can range from 0-80, with higher scores indicating greater perceived cognitive impairment.     MSSS SCORE & INTERPRETATION 2019   MSSS TANGIBLE SUPPORT SUBSCALE 87.5   MSSS EMOTIONAL/INFORMATIONAL SUPPORT SUBSCALE 81.25   MSSS AFFECTIONATE SUPPORT SUBSCALE 91.67   MSSS POSITIVE SOCIAL INTERACTION SUBSCALE 91.67   MSSS TOTAL SCORE 88.02   MSSS SCORE INTERPRETATION Scores can range from 0-100, with higher scores indicating greater perceived support.              Objective:        1. 25 foot timed walk:12.6s with tri-wheeled walker last visit; 16.0s today with tri-wheeled walker  Timed 25 Foot Walk: 2017   Did patient wear an AFO? No   Was assistive device used? Yes   Assistive device used (cira one): Bilateral Assistance   Bilateral device used Walker/Rollator   Time for 25 Foot Walk (seconds) 13.9         Neurologic Exam     Mental Status   Oriented to person, place, and time.   Follows 3 step commands.   Attention: normal.   Speech: speech is normal   Level of consciousness: alert  Normal comprehension.     Cranial Nerves     CN III, IV, VI   Pupils are equal, round, and reactive to light.  Extraocular motions are normal.     CN V   Facial sensation intact.     CN VII   Facial expression full, symmetric.     CN VIII   Hearing: intact (to conversation)    Motor Exam   Muscle bulk: normal    Strength   Right iliopsoas: 4/5  Left iliopsoas: 4/5  Right quadriceps: 5/5  Left quadriceps: 5/5  Right hamstrin/5  Left hamstrin/5  Right anterior tibial:  5/5  Left anterior tibial: 5/5    Gait, Coordination, and Reflexes     Gait  Gait: wide-based    Coordination   Tandem walking coordination: abnormal        Imaging:       Results for orders placed during the hospital encounter of 01/31/18   MRI Brain W WO Contrast    Narrative Procedure: MRI the brain with andwithout contrast.    Technique: Sagittal flair T2, axial T1, axial gradient and axial diffusion imaging of the whole brain pre-contrast.  Post contrast axial T2, axial T1, axial T2 flair and coronal thin sections oral gradient imaging of the whole brain. 8.5 mL of gadavist contrast was injected intravenously.            Comparison: 01/23/2017     Findings: Few scattered T2 flair hyperintense lesions in supratentorial parenchyma similar to prior. There is no corresponding diffusion restriction or enhancement. There is no new crackles and laterally. There is stable configuration of ventricles without hydrocephalus. Mild generalized volume loss which is appropriate for age.    There is no abnormal parenchymal enhancement. No restricted diffusion. Subtle diffusion hyperintensity associated with few of the T2 flair lesions.    Please note there is extension of the left frontal and right parietal lesions to the margin of the corpus callosum.    While these lesions are nonspecific in light of history and configuration remain most suggestive for prior areas of demyelination. There is no definite infratentorial lesion. No new lesion.    Impression  No significant change from prior.    Continued multiple scattered T2 flair lesions supratentorial parenchyma remains concerning for mild degree of prior demyelinating plaque.    No definite new lesion or enhancing lesion to suggest significant interval or active demyelination     Clinical correlation and continued followup advised      Electronically signed by: FRANK RAMOS DO  Date:     02/01/18  Time:    14:32          Labs:     Lab Results   Component Value Date     [id number] 137 (H) 05/07/2018    UVVOYREV66TS >96 (A) 07/14/2016     Lab Results   Component Value Date    JCVINDEX 2.87 (A) 07/14/2016    JCVANTIBODY Positive (A) 07/14/2016     Lab Results   Component Value Date    QP8MIXLZ 64.8 11/14/2016    ABSOLUTECD3 654 (L) 11/14/2016    SX5OINLO 20.5 11/14/2016    ABSOLUTECD8 207 11/14/2016    RE5DXXVR 44.1 11/14/2016    ABSOLUTECD4 445 11/14/2016    LABCD48 2.15 11/14/2016     Lab Results   Component Value Date    WBC 8.85 11/14/2016    RBC 4.32 11/14/2016    HGB 13.4 11/14/2016    HCT 39.9 11/14/2016    MCV 92 11/14/2016    MCH 31.0 11/14/2016    MCHC 33.6 11/14/2016    RDW 13.5 11/14/2016     11/14/2016    MPV 10.6 11/14/2016    GRAN 6.2 11/14/2016    GRAN 70.1 11/14/2016    LYMPH 1.1 11/14/2016    LYMPH 12.0 (L) 11/14/2016    MONO 1.2 (H) 11/14/2016    MONO 13.9 11/14/2016    EOS 0.3 11/14/2016    BASO 0.04 11/14/2016    EOSINOPHIL 3.4 11/14/2016    BASOPHIL 0.5 11/14/2016     Sodium   Date Value Ref Range Status   06/19/2019 136 136 - 145 mmol/L Final     Potassium   Date Value Ref Range Status   06/19/2019 4.5 3.5 - 5.1 mmol/L Final     Chloride   Date Value Ref Range Status   06/19/2019 99 95 - 110 mmol/L Final     CO2   Date Value Ref Range Status   06/19/2019 30 (H) 23 - 29 mmol/L Final     Glucose   Date Value Ref Range Status   06/19/2019 113 (H) 70 - 110 mg/dL Final     BUN, Bld   Date Value Ref Range Status   06/19/2019 25 (H) 8 - 23 mg/dL Final     Creatinine   Date Value Ref Range Status   06/19/2019 0.9 0.5 - 1.4 mg/dL Final     Calcium   Date Value Ref Range Status   06/19/2019 10.6 (H) 8.7 - 10.5 mg/dL Final     Total Protein   Date Value Ref Range Status   06/19/2019 7.6 6.0 - 8.4 g/dL Final     Albumin   Date Value Ref Range Status   06/19/2019 4.0 3.5 - 5.2 g/dL Final     Total Bilirubin   Date Value Ref Range Status   06/19/2019 0.3 0.1 - 1.0 mg/dL Final     Comment:     For infants and newborns, interpretation of results should be based  on  gestational age, weight and in agreement with clinical  observations.  Premature Infant recommended reference ranges:  Up to 24 hours.............<8.0 mg/dL  Up to 48 hours............<12.0 mg/dL  3-5 days..................<15.0 mg/dL  6-29 days.................<15.0 mg/dL       Alkaline Phosphatase   Date Value Ref Range Status   06/19/2019 61 55 - 135 U/L Final     AST   Date Value Ref Range Status   06/19/2019 26 10 - 40 U/L Final     ALT   Date Value Ref Range Status   06/19/2019 26 10 - 44 U/L Final     Anion Gap   Date Value Ref Range Status   06/19/2019 7 (L) 8 - 16 mmol/L Final     eGFR if    Date Value Ref Range Status   06/19/2019 >60 >60 mL/min/1.73 m^2 Final     eGFR if non    Date Value Ref Range Status   06/19/2019 >60 >60 mL/min/1.73 m^2 Final     Comment:     Calculation used to obtain the estimated glomerular filtration  rate (eGFR) is the CKD-EPI equation.         No results found for: HEPBSAG, HEPBSAB, HEPBCAB  Performed 11/13/2019 with PCP([facility])  Creatinine 0.73 mg/dL     CrCL-89.38    11/13/2019 Vitamin D, 25-Hydroxy, Total, Serum       Vitamin D, 25 Oh 78 NG/mL         Impression:        1.  Multiple Sclerosis  · Assessment: Patients timed walk is slower today-she states she has not been as dedicated to her exercises as usual. I did recommend PT, but she states she has had PT several times and has all the exercises that she should be doing. I emphasized bilateral HF exercises. She will return for follow up in 2 months.   · Imaging: discussed MRI; last visit had decided to wait until Jan 2021; however, with slower walk today will consider sooner.   · Disease Modifying Therapies: continue GA and vit D(continue current dose/brand([employer])     2.   MS Symptom Assessment / Management     Fatigue: refilled Provigil today     Gait: refilled Ampyra today(recent Creatinine done externally)-see above.  · No other changes to regimen described in ROS  above     Over 50% of this 40 minute visit was spent in direct face to face counseling of the patient about MS, DMT considerations, imaging, and MS symptom management.     Problem List Items Addressed This Visit        Neuro    MS (multiple sclerosis)    Relevant Medications    dalfampridine 10 mg Tb12    modafinil (PROVIGIL) 200 MG Tab       Ophtho    History of optic neuritis       Cardiac/Vascular    Essential hypertension       Other    Counseling regarding goals of care    Chronic fatigue    Relevant Medications    modafinil (PROVIGIL) 200 MG Tab    ROLAN (obstructive sleep apnea)    Relevant Medications    modafinil (PROVIGIL) 200 MG Tab    Prophylactic immunotherapy      Other Visit Diagnoses     Multiple sclerosis    -  Primary    Relevant Medications    dalfampridine 10 mg Tb12    modafinil (PROVIGIL) 200 MG Tab    Encounter for long-term (current) use of high-risk medication        Ampyra-safety labs ordered today    Vitamin D insufficiency        5,000IU/d-Vit D3 screening lab ordered today    Neurologic gait dysfunction        uses tri-wheeled walker     Abnormality of gait        Relevant Medications    dalfampridine 10 mg Tb12        Follow up in about 2 months (around 1/22/2020) for follow up with me.  Patient agreed to POC today.    Attending, Dr. Joshua, was available during today's encounter.     Ceci Servin PA-C  [facility]

## 2020-01-22 ENCOUNTER — LAB VISIT (OUTPATIENT)
Dept: LAB | Facility: HOSPITAL | Age: 70
End: 2020-01-22
Payer: MEDICARE

## 2020-01-22 ENCOUNTER — OFFICE VISIT (OUTPATIENT)
Dept: NEUROLOGY | Facility: CLINIC | Age: 70
End: 2020-01-22
Payer: MEDICARE

## 2020-01-22 VITALS
BODY MASS INDEX: 35.14 KG/M2 | HEART RATE: 72 BPM | DIASTOLIC BLOOD PRESSURE: 76 MMHG | HEIGHT: 60 IN | SYSTOLIC BLOOD PRESSURE: 142 MMHG | WEIGHT: 179 LBS

## 2020-01-22 DIAGNOSIS — Z79.899 ENCOUNTER FOR LONG-TERM (CURRENT) USE OF HIGH-RISK MEDICATION: ICD-10-CM

## 2020-01-22 DIAGNOSIS — G35 MULTIPLE SCLEROSIS: Primary | ICD-10-CM

## 2020-01-22 DIAGNOSIS — G35 MULTIPLE SCLEROSIS: ICD-10-CM

## 2020-01-22 DIAGNOSIS — R26.9 NEUROLOGIC GAIT DYSFUNCTION: ICD-10-CM

## 2020-01-22 DIAGNOSIS — E55.9 VITAMIN D INSUFFICIENCY: ICD-10-CM

## 2020-01-22 DIAGNOSIS — Z71.89 COUNSELING REGARDING GOALS OF CARE: ICD-10-CM

## 2020-01-22 DIAGNOSIS — Z29.89 PROPHYLACTIC IMMUNOTHERAPY: ICD-10-CM

## 2020-01-22 LAB
25(OH)D3+25(OH)D2 SERPL-MCNC: 98 NG/ML (ref 30–96)
CREAT SERPL-MCNC: 0.8 MG/DL (ref 0.5–1.4)
EST. GFR  (AFRICAN AMERICAN): >60 ML/MIN/1.73 M^2
EST. GFR  (NON AFRICAN AMERICAN): >60 ML/MIN/1.73 M^2

## 2020-01-22 PROCEDURE — 1159F MED LIST DOCD IN RCRD: CPT | Mod: ,,, | Performed by: PHYSICIAN ASSISTANT

## 2020-01-22 PROCEDURE — 99214 OFFICE O/P EST MOD 30 MIN: CPT | Mod: S$PBB,,, | Performed by: PHYSICIAN ASSISTANT

## 2020-01-22 PROCEDURE — 82565 ASSAY OF CREATININE: CPT

## 2020-01-22 PROCEDURE — 99999 PR PBB SHADOW E&M-EST. PATIENT-LVL IV: CPT | Mod: PBBFAC,,, | Performed by: PHYSICIAN ASSISTANT

## 2020-01-22 PROCEDURE — 99999 PR PBB SHADOW E&M-EST. PATIENT-LVL IV: ICD-10-PCS | Mod: PBBFAC,,, | Performed by: PHYSICIAN ASSISTANT

## 2020-01-22 PROCEDURE — 1159F PR MEDICATION LIST DOCUMENTED IN MEDICAL RECORD: ICD-10-PCS | Mod: ,,, | Performed by: PHYSICIAN ASSISTANT

## 2020-01-22 PROCEDURE — 36415 COLL VENOUS BLD VENIPUNCTURE: CPT

## 2020-01-22 PROCEDURE — 82306 VITAMIN D 25 HYDROXY: CPT

## 2020-01-22 PROCEDURE — 1126F PR PAIN SEVERITY QUANTIFIED, NO PAIN PRESENT: ICD-10-PCS | Mod: ,,, | Performed by: PHYSICIAN ASSISTANT

## 2020-01-22 PROCEDURE — 99214 PR OFFICE/OUTPT VISIT, EST, LEVL IV, 30-39 MIN: ICD-10-PCS | Mod: S$PBB,,, | Performed by: PHYSICIAN ASSISTANT

## 2020-01-22 PROCEDURE — 99214 OFFICE O/P EST MOD 30 MIN: CPT | Mod: PBBFAC | Performed by: PHYSICIAN ASSISTANT

## 2020-01-22 PROCEDURE — 1126F AMNT PAIN NOTED NONE PRSNT: CPT | Mod: ,,, | Performed by: PHYSICIAN ASSISTANT

## 2020-01-22 RX ORDER — UREA 40 %
CREAM (GRAM) TOPICAL
COMMUNITY
End: 2021-06-09 | Stop reason: ALTCHOICE

## 2020-01-22 NOTE — PROGRESS NOTES
Subjective:        Patient ID: Marie Lejeune is a 69 y.o. female who presents today with her  for a routine clinic visit for MS.  [facility] in November 2019 with this provider.    MS HPI:  · DMT: glatiramer acetate  · Side effects from DMT? No  · Taking vitamin D3 as recommended? Yes - 5,000IU/d   · In general, her walk is stable;  Uses tri-wheeled walker all the time.  Has a scooter for long-distances   · Patient does get up in the evening several times to urinate; however, states this is due to her BP meds  · Recent family members had flu, but patient did not.   · Has been exercising more regularly 5-6 days a week on bicycle and with weights.      Medications:  Current Outpatient Medications   Medication Sig    amlodipine (NORVASC) 10 MG tablet Take 10 mg by mouth once daily.    ascorbic acid (VITAMIN C) 500 MG tablet Take 500 mg by mouth once daily.    atenolol (TENORMIN) 50 MG tablet Take 25 mg by mouth once daily.    biotin 300 mcg Tab PATIENT NOT CERTAIN ON THE DOSAGE. TAKE ONE capsule by MOUTH daily    calcium carbonate (OS-ANISHA) 600 mg (1,500 mg) Tab Take 600 mg by mouth 2 (two) times daily with meals.    co-enzyme Q-10 30 mg capsule Take 400 mg by mouth once daily.    cyanocobalamin (VITAMIN B-12) 500 MCG tablet Take 500 mcg by mouth once daily.    dalfampridine 10 mg Tb12 TAKE 1 TABLET   EVERY 12 HOURS    ergocalciferol (VITAMIN D2) 50,000 unit Cap Take 5,000 Units by mouth once daily.     fish oil-omega-3 fatty acids 300-1,000 mg capsule Take 1,000 g by mouth once daily.    GLATOPA 40 mg/mL Syrg injection INJECT 40 MG UNDER THE SKIN THREE TIMES A WEEK    hydroCHLOROthiazide (MICROZIDE) 12.5 mg capsule Take 12.5 mg by mouth once daily.    losartan (COZAAR) 100 MG tablet     melatonin 5 mg Tab Take by mouth.    methylPREDNISolone sodium succinate (SOLU-MEDROL) 1,000 mg injection Solumedrol 1gm IV solution to be mixed in a smoothie and taken orally once a month for 6 months  total. To begin in August.    modafinil (PROVIGIL) 200 MG Tab Take 1 tablet (200 mg total) by mouth 2 (two) times daily.    multivitamin (ONE DAILY MULTIVITAMIN) per tablet Take 1 tablet by mouth once daily.    simvastatin (ZOCOR) 40 MG tablet Take 40 mg by mouth every evening.     No current facility-administered medications for this visit.        SOCIAL HISTORY  Social History     Tobacco Use    Smoking status: Never Smoker    Smokeless tobacco: Never Used   Substance Use Topics    Alcohol use: Not on file    Drug use: Not on file       Living arrangements - the patient lives with their spouse.  MS ROS:    As above         Objective:        1. 25 foot timed walk: 16.0s today with tri-wheeled walker  Timed 25 Foot Walk: 4/19/2017 1/22/2020   Did patient wear an AFO? No No   Was assistive device used? Yes Yes   Assistive device used (icra one): Bilateral Assistance Bilateral Assistance   Bilateral device used Walker/Rollator Walker/Rollator   Time for 25 Foot Walk (seconds) 13.9 13.1         Neurologic Exam     Mental Status   Oriented to person, place, and time.   Follows 3 step commands.   Attention: normal.   Speech: speech is normal   Level of consciousness: alert  Normal comprehension.     Cranial Nerves     CN VII   Facial expression full, symmetric.     CN VIII   Hearing: intact (to conversation)    Motor Exam   Muscle bulk: normal    Gait, Coordination, and Reflexes     Gait  Gait: wide-based (tri-wheeled walker)    Coordination   Tandem walking coordination: abnormal        Imaging:       Results for orders placed during the hospital encounter of 01/31/18   MRI Brain W WO Contrast    Narrative Procedure: MRI the brain with andwithout contrast.    Technique: Sagittal flair T2, axial T1, axial gradient and axial diffusion imaging of the whole brain pre-contrast.  Post contrast axial T2, axial T1, axial T2 flair and coronal thin sections oral gradient imaging of the whole brain. 8.5 mL of gadavist  contrast was injected intravenously.            Comparison: 01/23/2017     Findings: Few scattered T2 flair hyperintense lesions in supratentorial parenchyma similar to prior. There is no corresponding diffusion restriction or enhancement. There is no new crackles and laterally. There is stable configuration of ventricles without hydrocephalus. Mild generalized volume loss which is appropriate for age.    There is no abnormal parenchymal enhancement. No restricted diffusion. Subtle diffusion hyperintensity associated with few of the T2 flair lesions.    Please note there is extension of the left frontal and right parietal lesions to the margin of the corpus callosum.    While these lesions are nonspecific in light of history and configuration remain most suggestive for prior areas of demyelination. There is no definite infratentorial lesion. No new lesion.    Impression  No significant change from prior.    Continued multiple scattered T2 flair lesions supratentorial parenchyma remains concerning for mild degree of prior demyelinating plaque.    No definite new lesion or enhancing lesion to suggest significant interval or active demyelination     Clinical correlation and continued followup advised      Electronically signed by: FRANK RAMOS DO  Date:     02/01/18  Time:    14:32          Labs:     Lab Results   Component Value Date    MHXWVEJR08HF 98 (H) 01/22/2020    YCALMZVC34ZP 137 (H) 05/07/2018    ZCQRURSQ29KB >96 (A) 07/14/2016     Lab Results   Component Value Date    JCVINDEX 2.87 (A) 07/14/2016    JCVANTIBODY Positive (A) 07/14/2016     Lab Results   Component Value Date    RD9OKAGX 64.8 11/14/2016    ABSOLUTECD3 654 (L) 11/14/2016    SR9HMLJC 20.5 11/14/2016    ABSOLUTECD8 207 11/14/2016    OR5QVNRS 44.1 11/14/2016    ABSOLUTECD4 445 11/14/2016    LABCD48 2.15 11/14/2016     Lab Results   Component Value Date    WBC 8.85 11/14/2016    RBC 4.32 11/14/2016    HGB 13.4 11/14/2016    HCT 39.9 11/14/2016    MCV  92 11/14/2016    MCH 31.0 11/14/2016    MCHC 33.6 11/14/2016    RDW 13.5 11/14/2016     11/14/2016    MPV 10.6 11/14/2016    GRAN 6.2 11/14/2016    GRAN 70.1 11/14/2016    LYMPH 1.1 11/14/2016    LYMPH 12.0 (L) 11/14/2016    MONO 1.2 (H) 11/14/2016    MONO 13.9 11/14/2016    EOS 0.3 11/14/2016    BASO 0.04 11/14/2016    EOSINOPHIL 3.4 11/14/2016    BASOPHIL 0.5 11/14/2016     Sodium   Date Value Ref Range Status   06/19/2019 136 136 - 145 mmol/L Final     Potassium   Date Value Ref Range Status   06/19/2019 4.5 3.5 - 5.1 mmol/L Final     Chloride   Date Value Ref Range Status   06/19/2019 99 95 - 110 mmol/L Final     CO2   Date Value Ref Range Status   06/19/2019 30 (H) 23 - 29 mmol/L Final     Glucose   Date Value Ref Range Status   06/19/2019 113 (H) 70 - 110 mg/dL Final     BUN, Bld   Date Value Ref Range Status   06/19/2019 25 (H) 8 - 23 mg/dL Final     Creatinine   Date Value Ref Range Status   01/22/2020 0.8 0.5 - 1.4 mg/dL Final     Calcium   Date Value Ref Range Status   06/19/2019 10.6 (H) 8.7 - 10.5 mg/dL Final     Total Protein   Date Value Ref Range Status   06/19/2019 7.6 6.0 - 8.4 g/dL Final     Albumin   Date Value Ref Range Status   06/19/2019 4.0 3.5 - 5.2 g/dL Final     Total Bilirubin   Date Value Ref Range Status   06/19/2019 0.3 0.1 - 1.0 mg/dL Final     Comment:     For infants and newborns, interpretation of results should be based  on gestational age, weight and in agreement with clinical  observations.  Premature Infant recommended reference ranges:  Up to 24 hours.............<8.0 mg/dL  Up to 48 hours............<12.0 mg/dL  3-5 days..................<15.0 mg/dL  6-29 days.................<15.0 mg/dL       Alkaline Phosphatase   Date Value Ref Range Status   06/19/2019 61 55 - 135 U/L Final     AST   Date Value Ref Range Status   06/19/2019 26 10 - 40 U/L Final     ALT   Date Value Ref Range Status   06/19/2019 26 10 - 44 U/L Final     Anion Gap   Date Value Ref Range Status    06/19/2019 7 (L) 8 - 16 mmol/L Final     eGFR if    Date Value Ref Range Status   01/22/2020 >60.0 >60 mL/min/1.73 m^2 Final     eGFR if non    Date Value Ref Range Status   01/22/2020 >60.0 >60 mL/min/1.73 m^2 Final     Comment:     Calculation used to obtain the estimated glomerular filtration  rate (eGFR) is the CKD-EPI equation.         No results found for: HEPBSAG, HEPBSAB, HEPBCAB  Performed 11/13/2019 with PCP(Care Everywhere)  Creatinine 0.73 mg/dL     CrCL-89.38    11/13/2019 Vitamin D, 25-Hydroxy, Total, Serum       Vitamin D, 25 Oh 78 NG/mL         Impression:         Additional Impression:    1.  Multiple Sclerosis  · Assessment: Patients timed walk is improved today-she states she has been much more dedicated to her exercises .   · Imaging: discussed MRI; last visit had decided to wait until Jan 2021  · Disease Modifying Therapies: continue GA and vit D(continue current dose/brand([employer])-will check level     2.   MS Symptom Assessment / Management          Gait:  Ampyra today(safety lab ordered)  · No other changes to regimen described in ROS above     Over 50% of this 30 minute visit was spent in direct face to face counseling of the patient about MS, DMT considerations, imaging, and MS symptom management.     Problem List Items Addressed This Visit     None      Visit Diagnoses     Multiple sclerosis    -  Primary    Relevant Orders    CREATININE, SERUM (Completed)    Vitamin D (Completed)    Encounter for long-term (current) use of high-risk medication        Relevant Orders    CREATININE, SERUM (Completed)    Vitamin D insufficiency        Relevant Orders    Vitamin D (Completed)        Follow up in about 4 months (around 5/22/2020) for follow up with Dr. Joshua.  Patient agreed to POC today.    Attending, Dr. Joshua, was available during today's encounter.     Ceci Servin PA-C  [facility]

## 2020-05-19 ENCOUNTER — OFFICE VISIT (OUTPATIENT)
Dept: NEUROLOGY | Facility: CLINIC | Age: 70
End: 2020-05-19
Payer: MEDICARE

## 2020-05-19 DIAGNOSIS — Z71.89 COUNSELING REGARDING GOALS OF CARE: ICD-10-CM

## 2020-05-19 DIAGNOSIS — G35 MS (MULTIPLE SCLEROSIS): Primary | ICD-10-CM

## 2020-05-19 PROCEDURE — 99211 OFF/OP EST MAY X REQ PHY/QHP: CPT

## 2020-05-19 PROCEDURE — 99442 PR PHYSICIAN TELEPHONE EVALUATION 11-20 MIN: CPT | Mod: 95,,, | Performed by: PSYCHIATRY & NEUROLOGY

## 2020-05-19 PROCEDURE — G0463 HOSPITAL OUTPT CLINIC VISIT: HCPCS

## 2020-05-19 PROCEDURE — 99442 PR PHYSICIAN TELEPHONE EVALUATION 11-20 MIN: ICD-10-PCS | Mod: 95,,, | Performed by: PSYCHIATRY & NEUROLOGY

## 2020-05-19 NOTE — PROGRESS NOTES
"Established Patient - Audio Only Telehealth Visit     The patient location is: Home  The chief complaint leading to consultation is: MS  Visit type: Virtual visit with audio only (telephone)  Total time spent with patient: 20  The reason for the audio only service rather than synchronous audio and video virtual visit was related to technical difficulties or patient preference/necessity.     Each patient to whom I provide medical services by telemedicine is:  (1) informed of the relationship between the physician and patient and the respective role of any other health care provider with respect to management of the patient; and (2) notified that they may decline to receive medical services by telemedicine and may withdraw from such care at any time. Patient verbally consented to receive this service via voice-only telephone call.     HPI:  Pt states she is very stressed out right now.  She has family members living with her right now and this has been more prolonged b/c of COVID19.  Getting food delivered.   She feels depressed, and feels "guilty" for saying this.  Denies any kind of SI.   She states she stopped watching the news and this has helped, and she has also started to read more.   She states her walking is about the same.  She's been exercising religiously at home (exercise bike and leg lifts).    She takes Ampyra helps her walking;   She takes 5,000 IU of vit D daily.   Takes Provigil and CoQ10 for fatigue.   Denies any sense of progressive.      Assessment and plan:  MRI deferred until 2020.  Pt doing well;  Walk has improved over past 4 years, likely due to exercise, and certainly b/c MS not progressing.  F/u 4mo Ceci Servin PA-C                        This service was not originating from a related E/M service provided within the previous 7 days nor will  to an E/M service or procedure within the next 24 hours or my soonest available appointment.  Prevailing standard of care was able to be met " in this audio-only visit.

## 2020-06-03 DIAGNOSIS — G47.33 OSA (OBSTRUCTIVE SLEEP APNEA): ICD-10-CM

## 2020-06-03 DIAGNOSIS — G35 MULTIPLE SCLEROSIS: ICD-10-CM

## 2020-06-03 DIAGNOSIS — R26.9 ABNORMALITY OF GAIT: ICD-10-CM

## 2020-06-03 DIAGNOSIS — R53.82 CHRONIC FATIGUE: ICD-10-CM

## 2020-06-03 NOTE — TELEPHONE ENCOUNTER
----- Message from Ricky Nicole sent at 6/3/2020 11:43 AM CDT -----  Rx Refill/Request     Is this a Refill or New Rx: Refill   Rx Name and Strength: modafinil (PROVIGIL) 200 MG Tab, dalfampridine 10 mg Tb12, and GLATOPA 40 mg/mL Syrg injection  Preferred Pharmacy with phone number: see below   Communication Preference: [phone]  Additional Information:     [facility]  for [address] [address]  Phone: [phone] Fax: [phone]

## 2020-06-03 NOTE — TELEPHONE ENCOUNTER
----- Message from Natalia Pitt sent at 6/3/2020  3:19 PM CDT -----  MARIE LEJEUNE calling regarding Orders  (message) for lab. Call back [phone]

## 2020-06-04 RX ORDER — MODAFINIL 200 MG/1
200 TABLET ORAL 2 TIMES DAILY
Qty: 180 TABLET | Refills: 1 | Status: SHIPPED | OUTPATIENT
Start: 2020-06-04 | End: 2021-02-09

## 2020-06-04 RX ORDER — GLATIRAMER 40 MG/ML
INJECTION, SOLUTION SUBCUTANEOUS
Qty: 36 ML | Refills: 3 | Status: SHIPPED | OUTPATIENT
Start: 2020-06-04 | End: 2021-09-07

## 2020-06-04 RX ORDER — DALFAMPRIDINE 10 MG/1
1 TABLET, FILM COATED, EXTENDED RELEASE ORAL EVERY 12 HOURS
Qty: 60 TABLET | Refills: 0 | Status: SHIPPED | OUTPATIENT
Start: 2020-06-04 | End: 2020-06-10 | Stop reason: SDUPTHER

## 2020-06-05 NOTE — TELEPHONE ENCOUNTER
Spoke with patient and scheduled her labs for 6/8/2020 @ 9:45 [facility] [place].

## 2020-06-10 ENCOUNTER — LAB VISIT (OUTPATIENT)
Dept: LAB | Facility: HOSPITAL | Age: 70
End: 2020-06-10
Attending: PSYCHIATRY & NEUROLOGY
Payer: MEDICARE

## 2020-06-10 DIAGNOSIS — G35 MULTIPLE SCLEROSIS: ICD-10-CM

## 2020-06-10 LAB
CREAT SERPL-MCNC: 0.9 MG/DL (ref 0.5–1.4)
EST. GFR  (AFRICAN AMERICAN): >60 ML/MIN/1.73 M^2
EST. GFR  (NON AFRICAN AMERICAN): >60 ML/MIN/1.73 M^2

## 2020-06-10 PROCEDURE — 82565 ASSAY OF CREATININE: CPT

## 2020-06-10 PROCEDURE — 36415 COLL VENOUS BLD VENIPUNCTURE: CPT

## 2020-08-28 ENCOUNTER — TELEPHONE (OUTPATIENT)
Dept: NEUROLOGY | Facility: CLINIC | Age: 70
End: 2020-08-28

## 2020-08-28 DIAGNOSIS — G35 MS (MULTIPLE SCLEROSIS): Primary | ICD-10-CM

## 2020-08-28 NOTE — TELEPHONE ENCOUNTER
Returned call. Pt unable to sleep, and unable to walk long distances as her legs get heavy. Pt denies falls, but is holding onto walls and using walker. Symptoms started a few weeks ago. Symptoms are constant. Pt denies infections.

## 2020-08-28 NOTE — TELEPHONE ENCOUNTER
----- Message from Ekaterina Pereyra sent at 8/28/2020  8:20 AM CDT -----  Contact: self @ [phone]  Pt is requesting to speak with someone concerning her not being able to sleep.  Pls call.

## 2020-08-28 NOTE — TELEPHONE ENCOUNTER
Pt scheduled for office visit with Ceci on 8/31 at 3PM. Will call pt back for lab location preference.

## 2020-08-29 ENCOUNTER — LAB VISIT (OUTPATIENT)
Dept: LAB | Facility: HOSPITAL | Age: 70
End: 2020-08-29
Attending: PHYSICIAN ASSISTANT
Payer: MEDICARE

## 2020-08-29 DIAGNOSIS — G35 MS (MULTIPLE SCLEROSIS): ICD-10-CM

## 2020-08-29 LAB
ALBUMIN SERPL BCP-MCNC: 4.3 G/DL (ref 3.5–5.2)
ALP SERPL-CCNC: 80 U/L (ref 55–135)
ALT SERPL W/O P-5'-P-CCNC: 22 U/L (ref 10–44)
ANION GAP SERPL CALC-SCNC: 9 MMOL/L (ref 8–16)
AST SERPL-CCNC: 24 U/L (ref 10–40)
BASOPHILS # BLD AUTO: 0.06 K/UL (ref 0–0.2)
BASOPHILS NFR BLD: 0.6 % (ref 0–1.9)
BILIRUB SERPL-MCNC: 0.4 MG/DL (ref 0.1–1)
BUN SERPL-MCNC: 23 MG/DL (ref 8–23)
CALCIUM SERPL-MCNC: 10.2 MG/DL (ref 8.7–10.5)
CHLORIDE SERPL-SCNC: 93 MMOL/L (ref 95–110)
CO2 SERPL-SCNC: 30 MMOL/L (ref 23–29)
CREAT SERPL-MCNC: 0.9 MG/DL (ref 0.5–1.4)
DIFFERENTIAL METHOD: NORMAL
EOSINOPHIL # BLD AUTO: 0.1 K/UL (ref 0–0.5)
EOSINOPHIL NFR BLD: 0.9 % (ref 0–8)
ERYTHROCYTE [DISTWIDTH] IN BLOOD BY AUTOMATED COUNT: 12.9 % (ref 11.5–14.5)
EST. GFR  (AFRICAN AMERICAN): >60 ML/MIN/1.73 M^2
EST. GFR  (NON AFRICAN AMERICAN): >60 ML/MIN/1.73 M^2
GLUCOSE SERPL-MCNC: 117 MG/DL (ref 70–110)
HCT VFR BLD AUTO: 37.9 % (ref 37–48.5)
HGB BLD-MCNC: 12.8 G/DL (ref 12–16)
IMM GRANULOCYTES # BLD AUTO: 0.03 K/UL (ref 0–0.04)
IMM GRANULOCYTES NFR BLD AUTO: 0.3 % (ref 0–0.5)
LYMPHOCYTES # BLD AUTO: 1.9 K/UL (ref 1–4.8)
LYMPHOCYTES NFR BLD: 19.8 % (ref 18–48)
MCH RBC QN AUTO: 30.9 PG (ref 27–31)
MCHC RBC AUTO-ENTMCNC: 33.8 G/DL (ref 32–36)
MCV RBC AUTO: 92 FL (ref 82–98)
MONOCYTES # BLD AUTO: 0.9 K/UL (ref 0.3–1)
MONOCYTES NFR BLD: 9.8 % (ref 4–15)
NEUTROPHILS # BLD AUTO: 6.4 K/UL (ref 1.8–7.7)
NEUTROPHILS NFR BLD: 68.6 % (ref 38–73)
NRBC BLD-RTO: 0 /100 WBC
PLATELET # BLD AUTO: 331 K/UL (ref 150–350)
PMV BLD AUTO: 10.8 FL (ref 9.2–12.9)
POTASSIUM SERPL-SCNC: 4.1 MMOL/L (ref 3.5–5.1)
PROT SERPL-MCNC: 7.9 G/DL (ref 6–8.4)
RBC # BLD AUTO: 4.14 M/UL (ref 4–5.4)
SODIUM SERPL-SCNC: 132 MMOL/L (ref 136–145)
WBC # BLD AUTO: 9.39 K/UL (ref 3.9–12.7)

## 2020-08-29 PROCEDURE — 85025 COMPLETE CBC W/AUTO DIFF WBC: CPT

## 2020-08-29 PROCEDURE — 80053 COMPREHEN METABOLIC PANEL: CPT

## 2020-08-29 PROCEDURE — 36415 COLL VENOUS BLD VENIPUNCTURE: CPT

## 2020-08-31 ENCOUNTER — OFFICE VISIT (OUTPATIENT)
Dept: NEUROLOGY | Facility: CLINIC | Age: 70
End: 2020-08-31
Payer: MEDICARE

## 2020-08-31 VITALS
WEIGHT: 157.75 LBS | DIASTOLIC BLOOD PRESSURE: 83 MMHG | BODY MASS INDEX: 30.97 KG/M2 | HEIGHT: 60 IN | HEART RATE: 71 BPM | SYSTOLIC BLOOD PRESSURE: 151 MMHG

## 2020-08-31 DIAGNOSIS — R39.9 UTI SYMPTOMS: ICD-10-CM

## 2020-08-31 DIAGNOSIS — G35 MS (MULTIPLE SCLEROSIS): Primary | ICD-10-CM

## 2020-08-31 DIAGNOSIS — Z71.89 COUNSELING REGARDING GOALS OF CARE: ICD-10-CM

## 2020-08-31 DIAGNOSIS — R26.9 NEUROLOGIC GAIT DYSFUNCTION: ICD-10-CM

## 2020-08-31 PROCEDURE — 99213 OFFICE O/P EST LOW 20 MIN: CPT | Mod: PBBFAC | Performed by: PHYSICIAN ASSISTANT

## 2020-08-31 PROCEDURE — 99999 PR PBB SHADOW E&M-EST. PATIENT-LVL III: ICD-10-PCS | Mod: PBBFAC,,, | Performed by: PHYSICIAN ASSISTANT

## 2020-08-31 PROCEDURE — 99999 PR PBB SHADOW E&M-EST. PATIENT-LVL III: CPT | Mod: PBBFAC,,, | Performed by: PHYSICIAN ASSISTANT

## 2020-08-31 PROCEDURE — 99214 PR OFFICE/OUTPT VISIT, EST, LEVL IV, 30-39 MIN: ICD-10-PCS | Mod: S$PBB,,, | Performed by: PHYSICIAN ASSISTANT

## 2020-08-31 PROCEDURE — 99214 OFFICE O/P EST MOD 30 MIN: CPT | Mod: S$PBB,,, | Performed by: PHYSICIAN ASSISTANT

## 2020-08-31 NOTE — PROGRESS NOTES
Subjective:          Patient ID: Marie Lejeune is a 69 y.o. female who presents today for a fit-in clinic visit for MS.      MS HPI:  · DMT: glatiramer acetate  · Side effects from DMT? No  · Taking vitamin D3 as recommended? Yes    · Patient has not been feeling well for about a week. She is having bilateral LE muscle pain, and not sleeping well. No falls, continues to walk with walker. She feels like she is using her arms more because of her legs.   · No new visual issues  ·  states that her cognitive aspects seem a bit worse over the last week to 10 days.   · She has increased urination, but hesitancy.     Medications:  Current Outpatient Medications   Medication Sig    amlodipine (NORVASC) 10 MG tablet Take 10 mg by mouth once daily.    ascorbic acid (VITAMIN C) 500 MG tablet Take 500 mg by mouth once daily.    atenolol (TENORMIN) 50 MG tablet Take 25 mg by mouth once daily.    biotin 300 mcg Tab PATIENT NOT CERTAIN ON THE DOSAGE. TAKE ONE capsule by MOUTH daily    calcium carbonate (OS-ANISHA) 600 mg (1,500 mg) Tab Take 600 mg by mouth 2 (two) times daily with meals.    co-enzyme Q-10 30 mg capsule Take 400 mg by mouth once daily.    cyanocobalamin (VITAMIN B-12) 500 MCG tablet Take 500 mcg by mouth once daily.    dalfampridine 10 mg Tb12 TAKE 1 TABLET EVERY 12 HOURS    ergocalciferol (VITAMIN D2) 50,000 unit Cap Take 5,000 Units by mouth once daily.     fish oil-omega-3 fatty acids 300-1,000 mg capsule Take 1,000 g by mouth once daily.    glatiramer (GLATOPA) 40 mg/mL injection INJECT 40 MG UNDER THE SKIN THREE TIMES A WEEK    hydroCHLOROthiazide (MICROZIDE) 12.5 mg capsule Take 12.5 mg by mouth once daily.    losartan (COZAAR) 100 MG tablet     melatonin 5 mg Tab Take by mouth.    modafiniL (PROVIGIL) 200 MG Tab Take 1 tablet (200 mg total) by mouth 2 (two) times daily.    multivitamin (ONE DAILY MULTIVITAMIN) per tablet Take 1 tablet by mouth once daily.    simvastatin (ZOCOR) 40 MG  tablet Take 40 mg by mouth every evening.    urea (CARMOL) 40 % Crea urea 40 % topical cream   APPLY CREAM TOPICALLY TO CALLUSES FOR SOFTENING TWICE DAILY     No current facility-administered medications for this visit.        SOCIAL HISTORY  Social History     Tobacco Use    Smoking status: Never Smoker    Smokeless tobacco: Never Used   Substance Use Topics    Alcohol use: Not on file    Drug use: Not on file       Living arrangements - the patient lives with their spouse.    ROS:    REVIEW OF SYMPTOMS 11/22/2019   Do you feel abnormally tired on most days? Yes   Do you feel you generally sleep well? Yes   Do you have difficulty controlling your bladder?  No   Do you have difficulty controlling your bowels?  No   Do you have frequent muscle cramps, tightness or spasms in your limbs?  No   Do you have new visual symptoms?  No   Do you have worsening difficulty with your memory or thinking? No   Do you have worsening symptoms of anxiety or depression?  No   For patients who walk, Do you have more difficulty walking?  Yes   Have you fallen since your last visit?  No   For patients who use wheelchairs: Do you have any skin wounds or breakdown? Not Applicable   Do you have difficulty using your hands?  No   Do you have shooting or burning pain? No   Do you have difficulty with sexual function?  No   If you are sexually active, are you using birth control? Y/N  N/A Not Applicable   Do you often choke when swallowing liquids or solid food?  No   Do you experience worsening symptoms when overheated? Yes   Do you need any new equipment such as a wheelchair, walker or shower chair? No   Do you receive co-pay financial assistance for your principal MS medicine? Yes   Would you be interested in participating in an MS research trial in the future? No   Do you feel you have adequate family/friend support?  Yes   Do you have health insurance?   Yes   Are you currently employed? No   Do you receive SSDI/SSI?  No   Do you use  marijuana or cannabis products? No   Have you been diagnosed with a urinary tract infection since your last visit here? No   Have you been diagnosed with a respiratory tract infection since your last visit here? No   Have you been to the emergency room since your last visit here? No   Have you been hospitalized since your last visit here?  No                Objective:        1. 25 foot timed walk:  Timed 25 Foot Walk: 4/19/2017 1/22/2020   Did patient wear an AFO? No No   Was assistive device used? Yes Yes   Assistive device used (cira one): Bilateral Assistance Bilateral Assistance   Bilateral device used Walker/Rollator Walker/Rollator   Time for 25 Foot Walk (seconds) 13.9 13.1     Neurologic Exam      Imaging:     Results for orders placed during the hospital encounter of 01/31/18   MRI Brain W WO Contrast    Narrative Procedure: MRI the brain with andwithout contrast.    Technique: Sagittal flair T2, axial T1, axial gradient and axial diffusion imaging of the whole brain pre-contrast.  Post contrast axial T2, axial T1, axial T2 flair and coronal thin sections oral gradient imaging of the whole brain. 8.5 mL of gadavist contrast was injected intravenously.            Comparison: 01/23/2017     Findings: Few scattered T2 flair hyperintense lesions in supratentorial parenchyma similar to prior. There is no corresponding diffusion restriction or enhancement. There is no new crackles and laterally. There is stable configuration of ventricles without hydrocephalus. Mild generalized volume loss which is appropriate for age.    There is no abnormal parenchymal enhancement. No restricted diffusion. Subtle diffusion hyperintensity associated with few of the T2 flair lesions.    Please note there is extension of the left frontal and right parietal lesions to the margin of the corpus callosum.    While these lesions are nonspecific in light of history and configuration remain most suggestive for prior areas of  demyelination. There is no definite infratentorial lesion. No new lesion.    Impression  No significant change from prior.    Continued multiple scattered T2 flair lesions supratentorial parenchyma remains concerning for mild degree of prior demyelinating plaque.    No definite new lesion or enhancing lesion to suggest significant interval or active demyelination     Clinical correlation and continued followup advised      Electronically signed by: FRANK RAMOS DO  Date:     02/01/18  Time:    14:32          Labs:     Lab Results   Component Value Date    FERFXACI15XD 98 (H) 01/22/2020    PWUYDGZA05TL 137 (H) 05/07/2018    ZECCNFSB78GX >96 (A) 07/14/2016     Lab Results   Component Value Date    JCVINDEX 2.87 (A) 07/14/2016    JCVANTIBODY Positive (A) 07/14/2016     Lab Results   Component Value Date    SK1SPMEJ 64.8 11/14/2016    ABSOLUTECD3 654 (L) 11/14/2016    PV7ZWBIB 20.5 11/14/2016    ABSOLUTECD8 207 11/14/2016    RE4XUWCY 44.1 11/14/2016    ABSOLUTECD4 445 11/14/2016    LABCD48 2.15 11/14/2016     Lab Results   Component Value Date    WBC 9.39 08/29/2020    RBC 4.14 08/29/2020    HGB 12.8 08/29/2020    HCT 37.9 08/29/2020    MCV 92 08/29/2020    MCH 30.9 08/29/2020    MCHC 33.8 08/29/2020    RDW 12.9 08/29/2020     08/29/2020    MPV 10.8 08/29/2020    GRAN 6.4 08/29/2020    GRAN 68.6 08/29/2020    LYMPH 1.9 08/29/2020    LYMPH 19.8 08/29/2020    MONO 0.9 08/29/2020    MONO 9.8 08/29/2020    EOS 0.1 08/29/2020    BASO 0.06 08/29/2020    EOSINOPHIL 0.9 08/29/2020    BASOPHIL 0.6 08/29/2020     Sodium   Date Value Ref Range Status   08/29/2020 132 (L) 136 - 145 mmol/L Final     Potassium   Date Value Ref Range Status   08/29/2020 4.1 3.5 - 5.1 mmol/L Final     Chloride   Date Value Ref Range Status   08/29/2020 93 (L) 95 - 110 mmol/L Final     CO2   Date Value Ref Range Status   08/29/2020 30 (H) 23 - 29 mmol/L Final     Glucose   Date Value Ref Range Status   08/29/2020 117 (H) 70 - 110 mg/dL Final      BUN, Bld   Date Value Ref Range Status   08/29/2020 23 8 - 23 mg/dL Final     Creatinine   Date Value Ref Range Status   08/29/2020 0.9 0.5 - 1.4 mg/dL Final     Calcium   Date Value Ref Range Status   08/29/2020 10.2 8.7 - 10.5 mg/dL Final     Total Protein   Date Value Ref Range Status   08/29/2020 7.9 6.0 - 8.4 g/dL Final     Albumin   Date Value Ref Range Status   08/29/2020 4.3 3.5 - 5.2 g/dL Final     Total Bilirubin   Date Value Ref Range Status   08/29/2020 0.4 0.1 - 1.0 mg/dL Final     Comment:     For infants and newborns, interpretation of results should be based  on gestational age, weight and in agreement with clinical  observations.  Premature Infant recommended reference ranges:  Up to 24 hours.............<8.0 mg/dL  Up to 48 hours............<12.0 mg/dL  3-5 days..................<15.0 mg/dL  6-29 days.................<15.0 mg/dL       Alkaline Phosphatase   Date Value Ref Range Status   08/29/2020 80 55 - 135 U/L Final     AST   Date Value Ref Range Status   08/29/2020 24 10 - 40 U/L Final     ALT   Date Value Ref Range Status   08/29/2020 22 10 - 44 U/L Final     Anion Gap   Date Value Ref Range Status   08/29/2020 9 8 - 16 mmol/L Final     eGFR if    Date Value Ref Range Status   08/29/2020 >60 >60 mL/min/1.73 m^2 Final     eGFR if non    Date Value Ref Range Status   08/29/2020 >60 >60 mL/min/1.73 m^2 Final     Comment:     Calculation used to obtain the estimated glomerular filtration  rate (eGFR) is the CKD-EPI equation.        No results found for: HEPBSAG, HEPBSAB, HEPBCAB        MS Impression and Plan:     NEURO MULTIPLE SCLEROSIS IMPRESSION:   MS Status:     Number of relapses in the past year?:  0    Clinical Progression:  Worsened  Plan:     DMT:  Other    DMT comment:  Suspect patient is having pseudo relapse due to urinary tract infection. She recently completed UA that was mildly abnormal but did not reflex to culture. I'd like to recheck her urine  today     Next Labs Due: 8/31/2020      Our visit today lasted 20 minutes, and 100% of this time was spent face to face with the patient. Over 50% of this visit included discussion of the treatment plan/medication changes/symptom management/exam findings/coordination of care.     Problem List Items Addressed This Visit        Neuro    MS (multiple sclerosis) - Primary    Relevant Orders    Urinalysis, Reflex to Urine Culture Urine, Clean Catch       Other    Counseling regarding goals of care      Other Visit Diagnoses     UTI symptoms        Relevant Orders    Urinalysis, Reflex to Urine Culture Urine, Clean Catch    Neurologic gait dysfunction              Will follow up after lab.  Patient agreed to POC today.    Attending, Dr. Joshua, was available during today's encounter.     ROSALIA CulverC  [facility]

## 2020-09-02 ENCOUNTER — HOSPITAL ENCOUNTER (EMERGENCY)
Facility: HOSPITAL | Age: 70
Discharge: HOME | End: 2020-09-02
Attending: EMERGENCY MEDICINE
Payer: MEDICARE

## 2020-09-02 VITALS
OXYGEN SATURATION: 97 % | BODY MASS INDEX: 30.82 KG/M2 | RESPIRATION RATE: 30 BRPM | DIASTOLIC BLOOD PRESSURE: 67 MMHG | SYSTOLIC BLOOD PRESSURE: 146 MMHG | HEART RATE: 67 BPM | HEIGHT: 60 IN | TEMPERATURE: 98 F | WEIGHT: 157 LBS

## 2020-09-02 DIAGNOSIS — M79.604 PAIN IN BOTH LOWER EXTREMITIES: ICD-10-CM

## 2020-09-02 DIAGNOSIS — G35 MULTIPLE SCLEROSIS: ICD-10-CM

## 2020-09-02 DIAGNOSIS — M79.605 PAIN IN BOTH LOWER EXTREMITIES: ICD-10-CM

## 2020-09-02 DIAGNOSIS — R52 BODY ACHES: Primary | ICD-10-CM

## 2020-09-02 LAB
ALBUMIN SERPL BCP-MCNC: 3.8 G/DL (ref 3.5–5.2)
ALP SERPL-CCNC: 73 U/L (ref 55–135)
ALT SERPL W/O P-5'-P-CCNC: 24 U/L (ref 10–44)
ANION GAP SERPL CALC-SCNC: 9 MMOL/L (ref 8–16)
AST SERPL-CCNC: 25 U/L (ref 10–40)
BASOPHILS # BLD AUTO: 0.05 K/UL (ref 0–0.2)
BASOPHILS NFR BLD: 0.7 % (ref 0–1.9)
BILIRUB SERPL-MCNC: 0.4 MG/DL (ref 0.1–1)
BILIRUB UR QL STRIP: NEGATIVE
BUN SERPL-MCNC: 18 MG/DL (ref 8–23)
CALCIUM SERPL-MCNC: 9.5 MG/DL (ref 8.7–10.5)
CHLORIDE SERPL-SCNC: 96 MMOL/L (ref 95–110)
CLARITY UR: CLEAR
CO2 SERPL-SCNC: 28 MMOL/L (ref 23–29)
COLOR UR: YELLOW
CREAT SERPL-MCNC: 0.8 MG/DL (ref 0.5–1.4)
DIFFERENTIAL METHOD: ABNORMAL
EOSINOPHIL # BLD AUTO: 0.1 K/UL (ref 0–0.5)
EOSINOPHIL NFR BLD: 1.1 % (ref 0–8)
ERYTHROCYTE [DISTWIDTH] IN BLOOD BY AUTOMATED COUNT: 12.9 % (ref 11.5–14.5)
EST. GFR  (AFRICAN AMERICAN): >60 ML/MIN/1.73 M^2
EST. GFR  (NON AFRICAN AMERICAN): >60 ML/MIN/1.73 M^2
GLUCOSE SERPL-MCNC: 151 MG/DL (ref 70–110)
GLUCOSE UR QL STRIP: NEGATIVE
HCT VFR BLD AUTO: 35.8 % (ref 37–48.5)
HGB BLD-MCNC: 12.1 G/DL (ref 12–16)
HGB UR QL STRIP: NEGATIVE
IMM GRANULOCYTES # BLD AUTO: 0.02 K/UL (ref 0–0.04)
IMM GRANULOCYTES NFR BLD AUTO: 0.3 % (ref 0–0.5)
KETONES UR QL STRIP: NEGATIVE
LEUKOCYTE ESTERASE UR QL STRIP: NEGATIVE
LYMPHOCYTES # BLD AUTO: 1.3 K/UL (ref 1–4.8)
LYMPHOCYTES NFR BLD: 18.1 % (ref 18–48)
MCH RBC QN AUTO: 30.8 PG (ref 27–31)
MCHC RBC AUTO-ENTMCNC: 33.8 G/DL (ref 32–36)
MCV RBC AUTO: 91 FL (ref 82–98)
MICROSCOPIC COMMENT: NORMAL
MONOCYTES # BLD AUTO: 0.6 K/UL (ref 0.3–1)
MONOCYTES NFR BLD: 7.8 % (ref 4–15)
NEUTROPHILS # BLD AUTO: 5.3 K/UL (ref 1.8–7.7)
NEUTROPHILS NFR BLD: 72 % (ref 38–73)
NITRITE UR QL STRIP: NEGATIVE
NRBC BLD-RTO: 0 /100 WBC
PH UR STRIP: 6 [PH] (ref 5–8)
PLATELET # BLD AUTO: 303 K/UL (ref 150–350)
PMV BLD AUTO: 10.7 FL (ref 9.2–12.9)
POTASSIUM SERPL-SCNC: 3.4 MMOL/L (ref 3.5–5.1)
PROT SERPL-MCNC: 7.4 G/DL (ref 6–8.4)
PROT UR QL STRIP: NEGATIVE
RBC # BLD AUTO: 3.93 M/UL (ref 4–5.4)
SARS-COV-2 RDRP RESP QL NAA+PROBE: NEGATIVE
SODIUM SERPL-SCNC: 133 MMOL/L (ref 136–145)
SP GR UR STRIP: 1.01 (ref 1–1.03)
URN SPEC COLLECT METH UR: NORMAL
UROBILINOGEN UR STRIP-ACNC: NEGATIVE EU/DL
WBC # BLD AUTO: 7.41 K/UL (ref 3.9–12.7)

## 2020-09-02 PROCEDURE — 93010 ELECTROCARDIOGRAM REPORT: CPT | Mod: ,,, | Performed by: INTERNAL MEDICINE

## 2020-09-02 PROCEDURE — 85025 COMPLETE CBC W/AUTO DIFF WBC: CPT

## 2020-09-02 PROCEDURE — 93010 EKG 12-LEAD: ICD-10-PCS | Mod: ,,, | Performed by: INTERNAL MEDICINE

## 2020-09-02 PROCEDURE — 80053 COMPREHEN METABOLIC PANEL: CPT

## 2020-09-02 PROCEDURE — U0002 COVID-19 LAB TEST NON-CDC: HCPCS

## 2020-09-02 PROCEDURE — 81000 URINALYSIS NONAUTO W/SCOPE: CPT

## 2020-09-02 PROCEDURE — 93005 ELECTROCARDIOGRAM TRACING: CPT

## 2020-09-02 PROCEDURE — 99285 EMERGENCY DEPT VISIT HI MDM: CPT | Mod: 25

## 2020-09-02 RX ORDER — GABAPENTIN 300 MG/1
300 CAPSULE ORAL 3 TIMES DAILY
Qty: 90 CAPSULE | Refills: 0 | Status: SHIPPED | OUTPATIENT
Start: 2020-09-02 | End: 2020-10-09

## 2020-09-02 RX ORDER — AMLODIPINE BESYLATE 10 MG/1
10 TABLET ORAL DAILY
COMMUNITY

## 2020-09-02 NOTE — ED TRIAGE NOTES
Pt presents to the ED with c/o generalized body aches and weakness. Pt reports hx of multiple sclerosis. Pt reports pain and numbness in legs and trouble sleeping since last week. Pt endorses SOB when ambulating a long distance. Pt denies chest pain, nausea, vomiting, and abdominal pain. NAD noted.

## 2020-09-02 NOTE — DISCHARGE INSTRUCTIONS
You were seen in the emergency department for difficulty sleeping and leg pain.  We have given you a prescription that may help.  Take this initially at night.  Then you can try increasing the dosage throughout the day.  Start this medication in a tapered fashion.  Should you discontinue this medication do so in a similar tapered fashion.

## 2020-09-02 NOTE — ED PROVIDER NOTES
Encounter Date: 2020    SCRIBE #1 NOTE: I, Stuart Moseley, am scribing for, and in the presence of,  Tigre Louis MD. I have scribed the following portions of the note - Other sections scribed: HPI, ROS, PE.       History     Chief Complaint   Patient presents with    Generalized Body Aches     Pt c/o generalized body aches, confusion x3 weeks. Referred to ED to r/o covid-19. Reports hx of MS     Marie Lejeune is a 69 y.o. female with a PMHx of MS and HTN who presents to the ED with complaints of generalized body pains from MS for the last week.  Patient contacted PCP last Friday and went in for an appointment Monday. Pain primarily in legs, and the exact locations of pain is different in each leg. Endorses difficulty sleeping due to pain. Also states she was constipated one week ago, which was relieved after taking MiraLAX. Denies any fever, chills, cough, chest pain, shortness of breath, abdominal pain, urinary problems, nausea, vomiting, back pain, diarrhea, or changes in appetite. Denies any recent changes in medications.     The history is provided by the patient. No  was used.     Review of patient's allergies indicates:  No Known Allergies  Past Medical History:   Diagnosis Date    Hypertension     Multiple sclerosis      Past Surgical History:   Procedure Laterality Date     SECTION  1978    CHOLECYSTECTOMY  2004    parcial hysterectomy       No family history on file.  Social History     Tobacco Use    Smoking status: Never Smoker    Smokeless tobacco: Never Used   Substance Use Topics    Alcohol use: Not on file    Drug use: Not on file     Review of Systems   Constitutional: Negative for appetite change.        Positive for difficulty sleeping due to pain.    Respiratory: Negative for shortness of breath.    Cardiovascular: Negative for chest pain.   Gastrointestinal: Positive for constipation. Negative for abdominal pain, diarrhea, nausea and vomiting.    Genitourinary: Negative for difficulty urinating, dysuria and hematuria.   Musculoskeletal: Positive for myalgias. Negative for back pain.   Psychiatric/Behavioral: Negative for confusion.       Physical Exam     Initial Vitals [09/02/20 1039]   BP Pulse Resp Temp SpO2   (!) 156/86 82 18 98.9 °F (37.2 °C) 95 %      MAP       --         Physical Exam    Nursing note and vitals reviewed.  Constitutional: She appears well-developed and well-nourished. She is not diaphoretic. No distress.   HENT:   Head: Normocephalic and atraumatic.   Nose: Nose normal.   Eyes: EOM are normal. Pupils are equal, round, and reactive to light. No scleral icterus.   Neck: Normal range of motion. Neck supple.   Cardiovascular: Normal rate, regular rhythm, normal heart sounds and intact distal pulses. Exam reveals no gallop and no friction rub.    No murmur heard.  Pulmonary/Chest: Breath sounds normal. No stridor. No respiratory distress. She has no wheezes. She has no rhonchi. She has no rales.   Abdominal: Soft. Normal appearance and bowel sounds are normal. She exhibits no distension. There is no abdominal tenderness. There is no rebound and no guarding.   Musculoskeletal: Normal range of motion. No tenderness or edema.      Comments: Ankle clonus of left ankle. Mild hyperreflexia.    Neurological: She is alert and oriented to person, place, and time. No cranial nerve deficit. GCS score is 15. GCS eye subscore is 4. GCS verbal subscore is 5. GCS motor subscore is 6.   Mild right-sided weakness. Right-sided pronator drift.  Left ankle clonus   Skin: Skin is warm and dry. No rash noted.   Psychiatric: She has a normal mood and affect. Her behavior is normal.         ED Course   Procedures  Labs Reviewed   CBC W/ AUTO DIFFERENTIAL - Abnormal; Notable for the following components:       Result Value    RBC 3.93 (*)     Hematocrit 35.8 (*)     All other components within normal limits   COMPREHENSIVE METABOLIC PANEL - Abnormal; Notable for  the following components:    Sodium 133 (*)     Potassium 3.4 (*)     Glucose 151 (*)     All other components within normal limits   URINALYSIS, REFLEX TO URINE CULTURE    Narrative:     Specimen Source->Urine   SARS-COV-2 RNA AMPLIFICATION, QUAL   URINALYSIS MICROSCOPIC    Narrative:     Specimen Source->Urine     EKG Readings: (Independently Interpreted)   Rhythm: Normal Sinus Rhythm. Heart Rate: 77. Ectopy: No Ectopy. Conduction: Normal. ST Segments: Normal ST Segments. T Waves: Normal. Clinical Impression: Normal Sinus Rhythm   No ST segment elevation or depression concerning for acute ischemia.        ECG Results          EKG 12-lead (In process)  Result time 09/02/20 12:03:41    In process by Interface, Lab In [facility] (09/02/20 12:03:41)                 Narrative:    Test Reason : R07.9,    Vent. Rate : 077 BPM     Atrial Rate : 077 BPM     P-R Int : 188 ms          QRS Dur : 070 ms      QT Int : 400 ms       P-R-T Axes : 050 042 028 degrees     QTc Int : 452 ms    Normal sinus rhythm  Normal ECG  No previous ECGs available    Referred By: [email or web address]   SELF           Confirmed By:                   In process by Interface, Lab In [facility] (09/02/20 11:58:26)                 Narrative:    Test Reason : R07.9,    Vent. Rate : 077 BPM     Atrial Rate : 077 BPM     P-R Int : 188 ms          QRS Dur : 070 ms      QT Int : 400 ms       P-R-T Axes : 050 042 028 degrees     QTc Int : 452 ms    Normal sinus rhythm  Normal ECG  No previous ECGs available    Referred By: [email or web address]   SELF           Confirmed By:                             Imaging Results          X-Ray Chest AP Portable (Final result)  Result time 09/02/20 11:47:18    Final result by Rolly Nassar MD (09/02/20 11:47:18)                 Impression:      Blunting of the bilateral costophrenic angles, possibly related to small pleural effusions as discussed above.    No focal consolidation or other acute abnormality.      Electronically signed  "by: Rolly Nassar MD  Date:    09/02/2020  Time:    11:47             Narrative:    EXAMINATION:  XR CHEST AP PORTABLE    CLINICAL HISTORY:  Provided history is "Chest Pain;  ".    TECHNIQUE:  One view of the chest.    COMPARISON:  None.    FINDINGS:  Cardiac wires overlie the chest.  Cardiomediastinal silhouette is magnified by portable technique and is likely at the upper limits of normal in size.  Atherosclerotic calcifications overlie the aortic arch.  There is no large focal consolidation.  Blunting of the bilateral costophrenic angles could be related to soft tissue attenuation of the x-ray beam versus small pleural effusions or subsegmental atelectasis.  No pneumothorax.                               CT Head Without Contrast (Final result)  Result time 09/02/20 11:26:15    Final result by John Snell MD (09/02/20 11:26:15)                 Impression:      1. No CT findings to suggest an acute major vascular distribution infarct or intracranial hemorrhage.  2. Multifocal areas of periventricular white matter hypoattenuation, similar when compared to the previous MRI and corresponding with the patient's known demyelinating lesions.      Electronically signed by: John Snell MD  Date:    09/02/2020  Time:    11:26             Narrative:    EXAMINATION:  CT HEAD WITHOUT CONTRAST    CLINICAL HISTORY:  Altered mental status;    TECHNIQUE:  5-mm axial images were obtained through the head without the use of contrast.  Coronal and sagittal reformats were performed.    COMPARISON:  MRI 01/13/2018.    FINDINGS:  No CT findings to suggest an acute major vascular distribution infarct.  There are multifocal areas of parenchymal hypoattenuation within the periventricular white matter, similar when compared to the previous MRI and consistent with the patient's known demyelinating disease.  No intracranial hemorrhage.  No abnormal intra or extra-axial fluid collections.  No hydrocephalus.  No midline shift or " mass effect.    Paranasal sinuses and mastoid air cells are clear.  No acute osseous abnormalities.  Globes are symmetric.  Subcutaneous soft tissues are within normal limits.                                 Medical Decision Making:   Initial Assessment:   69-year-old female with history of MS presenting with leg pain.  Pain is relatively chronic though has worsened.  Patient reports she believes it is due to MS.  Recently saw her primary care provider.  Some concern for recent MS flare though no other acute findings.  Well-appearing in no acute distress.  Patient is AO x3.  Denies any confusion or other altered mental status.  Neurologic findings at baseline per patient.  Otherwise without significant complaint.  Well-appearing.  Workup including CT scan and urinalysis unremarkable.  Believe she is safe for discharge home.  Discussed return precautions as well as need to follow up with primary care.            Scribe Attestation:   Scribe #1: I performed the above scribed service and the documentation accurately describes the services I performed. I attest to the accuracy of the note.                          Clinical Impression:       ICD-10-CM ICD-9-CM   1. Body aches  R52 780.96   2. Pain in both lower extremities  M79.604 729.5    M79.605    3. Multiple sclerosis  G35 340         Disposition:   Disposition: Discharged  Condition: Stable     ED Disposition Condition    Discharge Stable        ED Prescriptions     Medication Sig Dispense Start Date End Date Auth. Provider    gabapentin (NEURONTIN) 300 MG capsule Take 1 capsule (300 mg total) by mouth 3 (three) times daily. Take 1/2 tab once per day for 2 days, then twice for two days, then 3 times per day for two days. Then start increasing dosage in similar fashion. 90 capsule 9/2/2020 10/2/2020 Tigre Louis MD        Follow-up Information     Follow up With Specialties Details Why Contact Info    Garry Angelo MD General Practice Schedule an  appointment as soon as possible for a visit   [address] [place] [address] [address]  [place] [address]  [phone]      [facility] Emergency Medicine  As needed, If symptoms worsen [address] [place] [address] [phone]                      I, Tigre Louis, personally performed the services described in this documentation. All medical record entries made by the scribe were at my direction and in my presence. I have reviewed the chart and agree that the record reflects my personal performance and is accurate and complete.                 Tigre Louis MD  09/02/20 2000

## 2020-09-09 ENCOUNTER — TELEPHONE (OUTPATIENT)
Dept: NEUROLOGY | Facility: CLINIC | Age: 70
End: 2020-09-09

## 2020-09-09 DIAGNOSIS — G35 MULTIPLE SCLEROSIS: ICD-10-CM

## 2020-09-09 NOTE — TELEPHONE ENCOUNTER
----- Message from Sofia Brumfield RN sent at 9/9/2020  1:00 PM CDT -----  Regarding: FW: pt advice  Contact: Amarilys (dtr) @ [phone]  Can we all talk about this pt before I call back? Pt was seen in clinic last week. We then sent her to the ER on Thursday/Friday.   ----- Message -----  From: Velia Wing  Sent: 9/9/2020  12:57 PM CDT  To: Malathi Ornelas Staff  Subject: pt advice                                        Caller asking to speak with someone regarding patient's condition, says she is getting worse. Please call.

## 2020-09-09 NOTE — TELEPHONE ENCOUNTER
""Pt is not doing well." Pt not sleeping and looks exhausted. "She's in constant pain." "Loses her train of thought." "Her legs feel numb." Pt's daughter states she was rx Lyrica while in the ER. Pt's  needs assistance in the home, for example bathing. Pt's daughter admits to constant urination.     Spoke with Maryann, who states she is doing fair. She is not sleeping at night and feels this could be contributing to being forgetful. She is also having pain in her legs, which she describes as "fidgety, numb, tingling and burning." Pt feels the gabapentin may be helping a little. Pt scheduled for MRI on 9/11/20 at 11:15AM. Pt and her daughter, who she has given me permission to speak to about her healthcare, are aware of MRI appt.  "

## 2020-09-09 NOTE — TELEPHONE ENCOUNTER
----- Message -----  From: eVlia Wing  Sent: 9/9/2020  12:57 PM CDT  To: Malathi Ornelas Staff  Subject: pt advice                                        Caller asking to speak with someone regarding patient's condition, says she is getting worse. Please call.

## 2020-09-11 ENCOUNTER — TELEPHONE (OUTPATIENT)
Dept: NEUROLOGY | Facility: CLINIC | Age: 70
End: 2020-09-11

## 2020-09-11 ENCOUNTER — HOSPITAL ENCOUNTER (OUTPATIENT)
Dept: RADIOLOGY | Facility: HOSPITAL | Age: 70
Discharge: HOME | End: 2020-09-11
Attending: PHYSICIAN ASSISTANT
Payer: MEDICARE

## 2020-09-11 DIAGNOSIS — G35 MULTIPLE SCLEROSIS: ICD-10-CM

## 2020-09-11 PROCEDURE — 70553 MRI BRAIN STEM W/O & W/DYE: CPT | Mod: TC

## 2020-09-11 PROCEDURE — 25500020 PHARM REV CODE 255: Performed by: PHYSICIAN ASSISTANT

## 2020-09-11 PROCEDURE — A9585 GADOBUTROL INJECTION: HCPCS | Performed by: PHYSICIAN ASSISTANT

## 2020-09-11 PROCEDURE — 70553 MRI BRAIN DEMYELINATING W/ WO CONTRAST: ICD-10-PCS | Mod: 26,,, | Performed by: RADIOLOGY

## 2020-09-11 PROCEDURE — 70553 MRI BRAIN STEM W/O & W/DYE: CPT | Mod: 26,,, | Performed by: RADIOLOGY

## 2020-09-11 RX ORDER — GADOBUTROL 604.72 MG/ML
7 INJECTION INTRAVENOUS
Status: COMPLETED | OUTPATIENT
Start: 2020-09-11 | End: 2020-09-11

## 2020-09-11 RX ADMIN — GADOBUTROL 7 ML: 604.72 INJECTION INTRAVENOUS at 11:09

## 2020-09-11 NOTE — TELEPHONE ENCOUNTER
----- Message from Ceci Servin PA-C sent at 9/11/2020  2:42 PM CDT -----  Stable MRI  Sofia--Please call patient to make her aware

## 2020-09-14 NOTE — TELEPHONE ENCOUNTER
----- Message from Velia Wing sent at 9/14/2020  9:48 AM CDT -----  Regarding: results  Contact: Amarilys (martin) @ [phone]  Callling to get the results from MRI. Please call.

## 2020-09-14 NOTE — TELEPHONE ENCOUNTER
Returned call to pt's daughter. Provided MRI results, and recommended she have pt follow up with her PCP. Pt's daughter states pt seems depressed and asks if providers would prescribe an anti-depressant. Also, requesting social workers help for assistance in the home.

## 2020-09-15 NOTE — TELEPHONE ENCOUNTER
----- Message -----  From: Christopher Aly  Sent: 9/14/2020   4:53 PM CDT  To: Malathi Ornelas Staff    Caller returning a missed call, pls return call

## 2020-09-18 ENCOUNTER — OFFICE VISIT (OUTPATIENT)
Dept: NEUROLOGY | Facility: CLINIC | Age: 70
End: 2020-09-18
Payer: MEDICARE

## 2020-09-18 VITALS — DIASTOLIC BLOOD PRESSURE: 72 MMHG | SYSTOLIC BLOOD PRESSURE: 137 MMHG

## 2020-09-18 DIAGNOSIS — F41.9 ANXIETY: ICD-10-CM

## 2020-09-18 DIAGNOSIS — G47.9 SLEEPING DIFFICULTY: ICD-10-CM

## 2020-09-18 DIAGNOSIS — Z71.89 COUNSELING REGARDING GOALS OF CARE: ICD-10-CM

## 2020-09-18 DIAGNOSIS — I10 ESSENTIAL HYPERTENSION: ICD-10-CM

## 2020-09-18 DIAGNOSIS — R26.9 NEUROLOGIC GAIT DYSFUNCTION: ICD-10-CM

## 2020-09-18 DIAGNOSIS — R53.82 CHRONIC FATIGUE: ICD-10-CM

## 2020-09-18 DIAGNOSIS — G47.33 OSA (OBSTRUCTIVE SLEEP APNEA): ICD-10-CM

## 2020-09-18 DIAGNOSIS — G35 MS (MULTIPLE SCLEROSIS): Primary | ICD-10-CM

## 2020-09-18 PROCEDURE — 99215 OFFICE O/P EST HI 40 MIN: CPT | Mod: 95,,, | Performed by: PHYSICIAN ASSISTANT

## 2020-09-18 PROCEDURE — 99215 PR OFFICE/OUTPT VISIT, EST, LEVL V, 40-54 MIN: ICD-10-PCS | Mod: 95,,, | Performed by: PHYSICIAN ASSISTANT

## 2020-09-18 NOTE — Clinical Note
Please reach out for counseling due to anxiety.Happy to discuss with you. Either Don or you are just fine.

## 2020-09-18 NOTE — PROGRESS NOTES
Subjective:          Patient ID: Marie Lejeune is a 69 y.o. female who presents today with daughter for a routine video visit for MS.      MS HPI:  · DMT: glatiramer acetate  · Side effects from DMT? No  · Taking vitamin D3 as recommended? Yes - 5,000IU/d   · She is having trouble with bathing- assisting   · She has been struggling with anxiety-due to COVID, home bound situation, spouse relationship(alcohol)  · She is going to PT for her shoulder  · Patient taking Provigil in AM and 200mg in evening  · Has PCP appt this Monday    The patient location is: home  The chief complaint leading to consultation is: follow up on MRI/MS    Visit type: audiovisual    Face to Face time with patient: 35  45 minutes of total time spent on the encounter, which includes face to face time and non-face to face time preparing to see the patient (eg, review of tests), Obtaining and/or reviewing separately obtained history, Documenting clinical information in the electronic or other health record, Independently interpreting results (not separately reported) and communicating results to the patient/family/caregiver, or Care coordination (not separately reported).         Each patient to whom he or she provides medical services by telemedicine is:  (1) informed of the relationship between the physician and patient and the respective role of any other health care provider with respect to management of the patient; and (2) notified that he or she may decline to receive medical services by telemedicine and may withdraw from such care at any time.    Notes:     Medications: reviewed and updated  Current Outpatient Medications   Medication Sig    amLODIPine (NORVASC) 10 MG tablet Take 10 mg by mouth once daily.    ascorbic acid (VITAMIN C) 500 MG tablet Take 500 mg by mouth once daily.    atenolol (TENORMIN) 50 MG tablet Take 25 mg by mouth once daily.    biotin 300 mcg Tab PATIENT NOT CERTAIN ON THE DOSAGE. TAKE ONE capsule by  MOUTH daily    calcium carbonate (OS-ANISHA) 600 mg (1,500 mg) Tab Take 600 mg by mouth 2 (two) times daily with meals.    co-enzyme Q-10 30 mg capsule Take 400 mg by mouth once daily.    cyanocobalamin (VITAMIN B-12) 500 MCG tablet Take 500 mcg by mouth once daily.    dalfampridine 10 mg Tb12 TAKE 1 TABLET EVERY 12 HOURS    ergocalciferol (VITAMIN D2) 50,000 unit Cap Take 5,000 Units by mouth once daily.     fish oil-omega-3 fatty acids 300-1,000 mg capsule Take 1,000 g by mouth once daily.    gabapentin (NEURONTIN) 300 MG capsule Take 1 capsule (300 mg total) by mouth 3 (three) times daily. Take 1/2 tab once per day for 2 days, then twice for two days, then 3 times per day for two days. Then start increasing dosage in similar fashion.    glatiramer (GLATOPA) 40 mg/mL injection INJECT 40 MG UNDER THE SKIN THREE TIMES A WEEK    hydroCHLOROthiazide (MICROZIDE) 12.5 mg capsule Take 12.5 mg by mouth once daily.    losartan (COZAAR) 100 MG tablet     melatonin 5 mg Tab Take by mouth.    modafiniL (PROVIGIL) 200 MG Tab Take 1 tablet (200 mg total) by mouth 2 (two) times daily.    multivitamin (ONE DAILY MULTIVITAMIN) per tablet Take 1 tablet by mouth once daily.    simvastatin (ZOCOR) 40 MG tablet Take 40 mg by mouth every evening.    urea (CARMOL) 40 % Crea urea 40 % topical cream   APPLY CREAM TOPICALLY TO CALLUSES FOR SOFTENING TWICE DAILY     No current facility-administered medications for this visit.        SOCIAL HISTORY  Social History     Tobacco Use    Smoking status: Never Smoker    Smokeless tobacco: Never Used   Substance Use Topics    Alcohol use: Not on file    Drug use: Not on file       Living arrangements - the patient lives with their spouse.    ROS:  As above             Objective:        1. 25 foot timed walk:  Timed 25 Foot Walk: 4/19/2017 1/22/2020   Did patient wear an AFO? No No   Was assistive device used? Yes Yes   Assistive device used (cira one): Bilateral Assistance Bilateral  Assistance   Bilateral device used Walker/Rollator Walker/Rollator   Time for 25 Foot Walk (seconds) 13.9 13.1       Neurologic Exam     Mental Status   Oriented to person, place, and time.   Speech: speech is normal   Level of consciousness: alert  Normal comprehension.   Well groomed, hair and makeup done(per usual-last clinic visit no makeup/hair done--not feeling well)     Cranial Nerves     CN VII   Facial expression full, symmetric.     CN VIII   Hearing: intact (intact to conversation via video visit)    Remainder of exam deferred    Imaging:       Results for orders placed during the hospital encounter of 09/11/20   MRI Brain Demyelinating W W/O Contrast    Impression There is no significant change from prior.  Continued scattered small punctate sized foci of T2 FLAIR signal abnormality supra infratentorial parenchyma in light of history concerning for mild moderate degree of prior demyelinating plaque.    No definite new lesion or enhancing lesion to suggest significant interval or active demyelination allowing for differences in technique.    No evidence for acute infarction or intracranial enhancing mass lesion.    Clinical correlation and follow-up advised      Electronically signed by: Justin Coronel DO  Date:    09/11/2020  Time:    12:19       Labs:     Lab Results   Component Value Date    IHMWEIMR51CM 98 (H) 01/22/2020    RBZUEATI34IB 137 (H) 05/07/2018    FCFQOSUY24NG >96 (A) 07/14/2016     Lab Results   Component Value Date    JCVINDEX 2.87 (A) 07/14/2016    JCVANTIBODY Positive (A) 07/14/2016     Lab Results   Component Value Date    DX2XFDSX 64.8 11/14/2016    ABSOLUTECD3 654 (L) 11/14/2016    ZP7OVIGM 20.5 11/14/2016    ABSOLUTECD8 207 11/14/2016    RP4LNWAR 44.1 11/14/2016    ABSOLUTECD4 445 11/14/2016    LABCD48 2.15 11/14/2016     Lab Results   Component Value Date    WBC 7.41 09/02/2020    RBC 3.93 (L) 09/02/2020    HGB 12.1 09/02/2020    HCT 35.8 (L) 09/02/2020    MCV 91 09/02/2020    MCH  30.8 09/02/2020    MCHC 33.8 09/02/2020    RDW 12.9 09/02/2020     09/02/2020    MPV 10.7 09/02/2020    GRAN 5.3 09/02/2020    GRAN 72.0 09/02/2020    LYMPH 1.3 09/02/2020    LYMPH 18.1 09/02/2020    MONO 0.6 09/02/2020    MONO 7.8 09/02/2020    EOS 0.1 09/02/2020    BASO 0.05 09/02/2020    EOSINOPHIL 1.1 09/02/2020    BASOPHIL 0.7 09/02/2020     Sodium   Date Value Ref Range Status   09/02/2020 133 (L) 136 - 145 mmol/L Final     Potassium   Date Value Ref Range Status   09/02/2020 3.4 (L) 3.5 - 5.1 mmol/L Final     Chloride   Date Value Ref Range Status   09/02/2020 96 95 - 110 mmol/L Final     CO2   Date Value Ref Range Status   09/02/2020 28 23 - 29 mmol/L Final     Glucose   Date Value Ref Range Status   09/02/2020 151 (H) 70 - 110 mg/dL Final     BUN, Bld   Date Value Ref Range Status   09/02/2020 18 8 - 23 mg/dL Final     Creatinine   Date Value Ref Range Status   09/02/2020 0.8 0.5 - 1.4 mg/dL Final     Calcium   Date Value Ref Range Status   09/02/2020 9.5 8.7 - 10.5 mg/dL Final     Total Protein   Date Value Ref Range Status   09/02/2020 7.4 6.0 - 8.4 g/dL Final     Albumin   Date Value Ref Range Status   09/02/2020 3.8 3.5 - 5.2 g/dL Final     Total Bilirubin   Date Value Ref Range Status   09/02/2020 0.4 0.1 - 1.0 mg/dL Final     Comment:     For infants and newborns, interpretation of results should be based  on gestational age, weight and in agreement with clinical  observations.  Premature Infant recommended reference ranges:  Up to 24 hours.............<8.0 mg/dL  Up to 48 hours............<12.0 mg/dL  3-5 days..................<15.0 mg/dL  6-29 days.................<15.0 mg/dL       Alkaline Phosphatase   Date Value Ref Range Status   09/02/2020 73 55 - 135 U/L Final     AST   Date Value Ref Range Status   09/02/2020 25 10 - 40 U/L Final     ALT   Date Value Ref Range Status   09/02/2020 24 10 - 44 U/L Final     Anion Gap   Date Value Ref Range Status   09/02/2020 9 8 - 16 mmol/L Final      eGFR if    Date Value Ref Range Status   09/02/2020 >60 >60 mL/min/1.73 m^2 Final     eGFR if non    Date Value Ref Range Status   09/02/2020 >60 >60 mL/min/1.73 m^2 Final     Comment:     Calculation used to obtain the estimated glomerular filtration  rate (eGFR) is the CKD-EPI equation.        No results found for: HEPBSAG, HEPBSAB, HEPBCAB        MS Impression and Plan:     NEURO MULTIPLE SCLEROSIS IMPRESSION:   MS Status:     MRI Progression:  Stable    MRI Progression comment:  MRI Brain stable done 9/11/2020  Plan:     DMT:  No change in management    DMT comment:  Continue glatiramer TIW and high dose Vit D3    Symptom Management:  Implement change in symptom management    Implement Change in Symptom Management:  Pain, Sleep, Fatigue and Mood     Next Imaging Due: 9/18/2021     Provigil adjust dosing to 200mg in AM and 100mg at noon(patient was taking 200mg in evening)-hopeful this will help with her sleep at night  OK to stop gabapentin if not helpful for NP pain(concerned may be causing some confusion)  Recommend counseling for anxiety    Our video visit today lasted 45 minutes, and 100% of this time was spent face to face with the patient. Over 50% of this visit included discussion of the treatment plan/medication changes/symptom management/exam findings/imaging results/coordination of care.     Problem List Items Addressed This Visit        Neuro    MS (multiple sclerosis) - Primary     MRI stable-focusing on symptom management and medication correction/adjustment            Psychiatric    Anxiety     Referral to MS social work for counseling         Relevant Orders    Ambulatory referral/consult to Multiple Sclerosis Social Work       Cardiac/Vascular    Essential hypertension       Other    Counseling regarding goals of care    Neurologic gait dysfunction    Sleeping difficulty     Patient was taking second dose of Modafinil in the evening-I am hopeful that correcting  dosing schedule will improve her sleep         Chronic fatigue    ROLAN (obstructive sleep apnea)          Follow up in about 3 weeks (around 10/9/2020) for follow up with me.  Patient agreed to POC today.    Attending, Dr. Joshua, was available during today's encounter.     Ceci Servin PA-C  [facility]

## 2020-09-18 NOTE — ASSESSMENT & PLAN NOTE
Patient was taking second dose of Modafinil in the evening-I am hopeful that correcting dosing schedule will improve her sleep

## 2020-09-18 NOTE — PATIENT INSTRUCTIONS
Provigil-200mg in AM and 100mg at noon(no later)  Ok to stop gabapentin if not helpful for tingling pain

## 2020-09-21 ENCOUNTER — TELEPHONE (OUTPATIENT)
Dept: PSYCHIATRY | Facility: CLINIC | Age: 70
End: 2020-09-21

## 2020-09-30 NOTE — TELEPHONE ENCOUNTER
Called pt re: counseling, pt is no longer interested in counseling at this time, but expressed need for additional shower equipment. Pt stated she feels like she needs additional seating. This writer will consult with pt's provider, Ceci Servin PA-C.

## 2020-10-07 DIAGNOSIS — M19.011 OSTEOARTHRITIS OF RIGHT SHOULDER REGION: Primary | ICD-10-CM

## 2020-10-09 ENCOUNTER — OFFICE VISIT (OUTPATIENT)
Dept: NEUROLOGY | Facility: CLINIC | Age: 70
End: 2020-10-09
Payer: MEDICARE

## 2020-10-09 DIAGNOSIS — M19.011 OSTEOARTHRITIS OF RIGHT SHOULDER, UNSPECIFIED OSTEOARTHRITIS TYPE: ICD-10-CM

## 2020-10-09 DIAGNOSIS — F41.9 ANXIETY: ICD-10-CM

## 2020-10-09 DIAGNOSIS — R26.9 NEUROLOGIC GAIT DYSFUNCTION: ICD-10-CM

## 2020-10-09 DIAGNOSIS — I10 ESSENTIAL HYPERTENSION: ICD-10-CM

## 2020-10-09 DIAGNOSIS — R53.82 CHRONIC FATIGUE: ICD-10-CM

## 2020-10-09 DIAGNOSIS — G35 MS (MULTIPLE SCLEROSIS): Primary | ICD-10-CM

## 2020-10-09 DIAGNOSIS — Z71.89 COUNSELING REGARDING GOALS OF CARE: ICD-10-CM

## 2020-10-09 PROCEDURE — 99215 PR OFFICE/OUTPT VISIT, EST, LEVL V, 40-54 MIN: ICD-10-PCS | Mod: 95,,, | Performed by: PHYSICIAN ASSISTANT

## 2020-10-09 PROCEDURE — 99215 OFFICE O/P EST HI 40 MIN: CPT | Mod: 95,,, | Performed by: PHYSICIAN ASSISTANT

## 2020-10-09 RX ORDER — FLUOXETINE HYDROCHLORIDE 20 MG/1
20 CAPSULE ORAL DAILY
COMMUNITY
End: 2020-11-02

## 2020-10-09 NOTE — PROGRESS NOTES
Subjective:          Patient ID: Marie Lejeune is a 69 y.o. female who presents today with daughter for a routine  Video visit for MS.      MS HPI:  · DMT: glatiramer acetate  · Side effects from DMT? No  · Taking vitamin D3 as recommended? Yes - 5,000IU/d   · Patient has significant Right shoulder pain and has seen ortho-tried and failed PT--plan for shoulder replacement. Is not taking pain medication as it is too fatiguing for her.   · Pain in shoulder is making all activities very difficult- is assisting  · She discussed her mood with PCP and Prozac was initiated(20mg), she has deferred counseling for now  · She has corrected her Provigil dosing and is now taking in AM and noon as prescribed-does not feel she needs the noon dose     The patient location is: home  The chief complaint leading to consultation is: MS follow up    Visit type: audiovisual    Face to Face time with patient: 30 minutes  40 minutes of total time spent on the encounter, which includes face to face time and non-face to face time preparing to see the patient (eg, review of tests), Obtaining and/or reviewing separately obtained history, Documenting clinical information in the electronic or other health record, Independently interpreting results (not separately reported) and communicating results to the patient/family/caregiver, or Care coordination (not separately reported).         Each patient to whom he or she provides medical services by telemedicine is:  (1) informed of the relationship between the physician and patient and the respective role of any other health care provider with respect to management of the patient; and (2) notified that he or she may decline to receive medical services by telemedicine and may withdraw from such care at any time.          Medications: reviewed with patient and updated by provider  Current Outpatient Medications   Medication Sig    amLODIPine (NORVASC) 10 MG tablet Take 10 mg by mouth once  daily.    ascorbic acid (VITAMIN C) 500 MG tablet Take 500 mg by mouth once daily.    atenolol (TENORMIN) 50 MG tablet Take 25 mg by mouth once daily.    biotin 300 mcg Tab PATIENT NOT CERTAIN ON THE DOSAGE. TAKE ONE capsule by MOUTH daily    calcium carbonate (OS-ANISHA) 600 mg (1,500 mg) Tab Take 600 mg by mouth 2 (two) times daily with meals.    co-enzyme Q-10 30 mg capsule Take 400 mg by mouth once daily.    cyanocobalamin (VITAMIN B-12) 500 MCG tablet Take 500 mcg by mouth once daily.    dalfampridine 10 mg Tb12 TAKE 1 TABLET EVERY 12 HOURS    ergocalciferol (VITAMIN D2) 50,000 unit Cap Take 5,000 Units by mouth once daily.     fish oil-omega-3 fatty acids 300-1,000 mg capsule Take 1,000 g by mouth once daily.    FLUoxetine 20 MG capsule Take 20 mg by mouth once daily.    glatiramer (GLATOPA) 40 mg/mL injection INJECT 40 MG UNDER THE SKIN THREE TIMES A WEEK    hydroCHLOROthiazide (MICROZIDE) 12.5 mg capsule Take 12.5 mg by mouth once daily.    losartan (COZAAR) 100 MG tablet     melatonin 5 mg Tab Take by mouth.    modafiniL (PROVIGIL) 200 MG Tab Take 1 tablet (200 mg total) by mouth 2 (two) times daily.    multivitamin (ONE DAILY MULTIVITAMIN) per tablet Take 1 tablet by mouth once daily.    simvastatin (ZOCOR) 40 MG tablet Take 40 mg by mouth every evening.    urea (CARMOL) 40 % Crea urea 40 % topical cream   APPLY CREAM TOPICALLY TO CALLUSES FOR SOFTENING TWICE DAILY    gabapentin (NEURONTIN) 300 MG capsule Take 1 capsule (300 mg total) by mouth 3 (three) times daily. Take 1/2 tab once per day for 2 days, then twice for two days, then 3 times per day for two days. Then start increasing dosage in similar fashion.     No current facility-administered medications for this visit.        SOCIAL HISTORY  Social History     Tobacco Use    Smoking status: Never Smoker    Smokeless tobacco: Never Used   Substance Use Topics    Alcohol use: Not on file    Drug use: Not on file       Living  arrangements - the patient lives with their spouse.    ROS:  As above           Objective:        1. 25 foot timed walk:  Timed 25 Foot Walk: 4/19/2017 1/22/2020   Did patient wear an AFO? No No   Was assistive device used? Yes Yes   Assistive device used (cira one): Bilateral Assistance Bilateral Assistance   Bilateral device used Walker/Rollator Walker/Rollator   Time for 25 Foot Walk (seconds) 13.9 13.1     Neurologic Exam     Mental Status   Oriented to person, place, and time.   Attention: normal.   Speech: speech is normal   Level of consciousness: alert  Normal comprehension.     Gait, Coordination, and Reflexes     Gait  Gait: (tri-wheeled walker)  Patient ambulated in home with tri-wheeled walker independently-but slow. Sit-stand, vice-versa independent         Imaging:       Results for orders placed during the hospital encounter of 09/11/20   MRI Brain Demyelinating W W/O Contrast    Impression There is no significant change from prior.  Continued scattered small punctate sized foci of T2 FLAIR signal abnormality supra infratentorial parenchyma in light of history concerning for mild moderate degree of prior demyelinating plaque.    No definite new lesion or enhancing lesion to suggest significant interval or active demyelination allowing for differences in technique.    No evidence for acute infarction or intracranial enhancing mass lesion.    Clinical correlation and follow-up advised      Electronically signed by: Justin Coronel DO  Date:    09/11/2020  Time:    12:19     No results found for this or any previous visit.  No results found for this or any previous visit.      Labs:     Lab Results   Component Value Date    [id number] 98 (H) 01/22/2020    [id number] 137 (H) 05/07/2018    [id number] >96 (A) 07/14/2016     Lab Results   Component Value Date    JCVINDEX 2.87 (A) 07/14/2016    JCVANTIBODY Positive (A) 07/14/2016     Lab Results   Component Value Date    FQ9IPYTQ 64.8 11/14/2016     ABSOLUTECD3 654 (L) 11/14/2016    MH9FGOJC 20.5 11/14/2016    ABSOLUTECD8 207 11/14/2016    AG5ODLHN 44.1 11/14/2016    ABSOLUTECD4 445 11/14/2016    LABCD48 2.15 11/14/2016     Lab Results   Component Value Date    WBC 7.41 09/02/2020    RBC 3.93 (L) 09/02/2020    HGB 12.1 09/02/2020    HCT 35.8 (L) 09/02/2020    MCV 91 09/02/2020    MCH 30.8 09/02/2020    MCHC 33.8 09/02/2020    RDW 12.9 09/02/2020     09/02/2020    MPV 10.7 09/02/2020    GRAN 5.3 09/02/2020    GRAN 72.0 09/02/2020    LYMPH 1.3 09/02/2020    LYMPH 18.1 09/02/2020    MONO 0.6 09/02/2020    MONO 7.8 09/02/2020    EOS 0.1 09/02/2020    BASO 0.05 09/02/2020    EOSINOPHIL 1.1 09/02/2020    BASOPHIL 0.7 09/02/2020     Sodium   Date Value Ref Range Status   09/02/2020 133 (L) 136 - 145 mmol/L Final     Potassium   Date Value Ref Range Status   09/02/2020 3.4 (L) 3.5 - 5.1 mmol/L Final     Chloride   Date Value Ref Range Status   09/02/2020 96 95 - 110 mmol/L Final     CO2   Date Value Ref Range Status   09/02/2020 28 23 - 29 mmol/L Final     Glucose   Date Value Ref Range Status   09/02/2020 151 (H) 70 - 110 mg/dL Final     BUN, Bld   Date Value Ref Range Status   09/02/2020 18 8 - 23 mg/dL Final     Creatinine   Date Value Ref Range Status   09/02/2020 0.8 0.5 - 1.4 mg/dL Final     Calcium   Date Value Ref Range Status   09/02/2020 9.5 8.7 - 10.5 mg/dL Final     Total Protein   Date Value Ref Range Status   09/02/2020 7.4 6.0 - 8.4 g/dL Final     Albumin   Date Value Ref Range Status   09/02/2020 3.8 3.5 - 5.2 g/dL Final     Total Bilirubin   Date Value Ref Range Status   09/02/2020 0.4 0.1 - 1.0 mg/dL Final     Comment:     For infants and newborns, interpretation of results should be based  on gestational age, weight and in agreement with clinical  observations.  Premature Infant recommended reference ranges:  Up to 24 hours.............<8.0 mg/dL  Up to 48 hours............<12.0 mg/dL  3-5 days..................<15.0 mg/dL  6-29  "days.................<15.0 mg/dL       Alkaline Phosphatase   Date Value Ref Range Status   09/02/2020 73 55 - 135 U/L Final     AST   Date Value Ref Range Status   09/02/2020 25 10 - 40 U/L Final     ALT   Date Value Ref Range Status   09/02/2020 24 10 - 44 U/L Final     Anion Gap   Date Value Ref Range Status   09/02/2020 9 8 - 16 mmol/L Final     eGFR if    Date Value Ref Range Status   09/02/2020 >60 >60 mL/min/1.73 m^2 Final     eGFR if non    Date Value Ref Range Status   09/02/2020 >60 >60 mL/min/1.73 m^2 Final     Comment:     Calculation used to obtain the estimated glomerular filtration  rate (eGFR) is the CKD-EPI equation.        No results found for: HEPBSAG, HEPBSAB, HEPBCAB        MS Impression and Plan:     NEURO MULTIPLE SCLEROSIS IMPRESSION:   MS Status:     Number of relapses in the past year?:  0    Clinical Progression:  Clinically Stable    MRI Progression:  Stable  Plan:     DMT:  No change in management    DMT comment:  Continue Glatopa and high dose Vit D3(5,000IU/d). At this time, patient is cleared for surgery from a neurological perspective.     Symptom Management:  Implement change in symptom management    Implement Change in Symptom Management:  Pain, Mood and Fatigue     Next Imaging Due: 9/9/2021     Next Labs Due: 3/9/2021       Removed gabapentin from her med list. Encouraged speaking with PCP/Ortho regarding OTC use of Tylenol/Ibuprofen for right shoulder pain control as she feels the pain medications cause too much fatigue    Encouraged counseling; patient deferred for now, but she did start of Prozac(prescribed by PCP-Dr. Angelo)    OK to take Provigil 100-200 mg in AM and 100-200mg at noon, but encouraged "drug holiday"     Our video visit today lasted a total of 40 minutes(see above for direct face to face time).  Over 50% of this visit included discussion of the treatment plan/medication changes/symptom management/exam findings/imaging " "results/coordination of care.     Problem List Items Addressed This Visit        Neuro    MS (multiple sclerosis) - Primary     Stable on Glatopa and high dose Vit D3            Psychiatric    Anxiety       Cardiac/Vascular    Essential hypertension       Other    Counseling regarding goals of care    Neurologic gait dysfunction    Chronic fatigue     Provigil 100-200mg up to twice daily(AM and Noon). Encouraged to take "drug holiday"           Other Visit Diagnoses     Osteoarthritis of right shoulder, unspecified osteoarthritis type              Follow up in about 4 months (around 2/9/2021) for follow up with Dr. Joshua.  Patient agreed to POC today.    Attending, Dr. Joshua, was available during today's encounter.     Ceci Servin PA-C  [facility]      "

## 2020-10-19 ENCOUNTER — HOSPITAL ENCOUNTER (OUTPATIENT)
Dept: RADIOLOGY | Facility: HOSPITAL | Age: 70
Discharge: HOME | End: 2020-10-19
Attending: ORTHOPAEDIC SURGERY
Payer: MEDICARE

## 2020-10-19 DIAGNOSIS — M19.011 OSTEOARTHRITIS OF RIGHT SHOULDER REGION: ICD-10-CM

## 2020-10-19 PROCEDURE — 73221 MRI JOINT UPR EXTREM W/O DYE: CPT | Mod: TC,RT

## 2020-10-19 PROCEDURE — 73221 MRI SHOULDER WITHOUT CONTRAST RIGHT: ICD-10-PCS | Mod: 26,RT,, | Performed by: RADIOLOGY

## 2020-10-19 PROCEDURE — 73221 MRI JOINT UPR EXTREM W/O DYE: CPT | Mod: 26,RT,, | Performed by: RADIOLOGY

## 2020-10-22 ENCOUNTER — PATIENT MESSAGE (OUTPATIENT)
Dept: NEUROLOGY | Facility: CLINIC | Age: 70
End: 2020-10-22

## 2020-11-02 ENCOUNTER — PATIENT MESSAGE (OUTPATIENT)
Dept: PSYCHIATRY | Facility: CLINIC | Age: 70
End: 2020-11-02

## 2020-11-02 ENCOUNTER — OFFICE VISIT (OUTPATIENT)
Dept: NEUROLOGY | Facility: CLINIC | Age: 70
End: 2020-11-02
Payer: MEDICARE

## 2020-11-02 VITALS
BODY MASS INDEX: 30.82 KG/M2 | WEIGHT: 157 LBS | SYSTOLIC BLOOD PRESSURE: 141 MMHG | HEART RATE: 70 BPM | HEIGHT: 60 IN | DIASTOLIC BLOOD PRESSURE: 91 MMHG | TEMPERATURE: 97 F

## 2020-11-02 DIAGNOSIS — F41.9 ANXIETY: Primary | ICD-10-CM

## 2020-11-02 DIAGNOSIS — Z71.89 COUNSELING REGARDING GOALS OF CARE: ICD-10-CM

## 2020-11-02 DIAGNOSIS — R26.9 NEUROLOGIC GAIT DYSFUNCTION: ICD-10-CM

## 2020-11-02 DIAGNOSIS — G35 MS (MULTIPLE SCLEROSIS): ICD-10-CM

## 2020-11-02 DIAGNOSIS — F32.A DEPRESSION, UNSPECIFIED DEPRESSION TYPE: ICD-10-CM

## 2020-11-02 DIAGNOSIS — M19.011 OSTEOARTHRITIS OF RIGHT SHOULDER, UNSPECIFIED OSTEOARTHRITIS TYPE: ICD-10-CM

## 2020-11-02 PROCEDURE — 99999 PR PBB SHADOW E&M-EST. PATIENT-LVL III: CPT | Mod: PBBFAC,,, | Performed by: PSYCHIATRY & NEUROLOGY

## 2020-11-02 PROCEDURE — 99999 PR PBB SHADOW E&M-EST. PATIENT-LVL III: ICD-10-PCS | Mod: PBBFAC,,, | Performed by: PSYCHIATRY & NEUROLOGY

## 2020-11-02 PROCEDURE — 99213 OFFICE O/P EST LOW 20 MIN: CPT | Mod: PBBFAC | Performed by: PSYCHIATRY & NEUROLOGY

## 2020-11-02 PROCEDURE — 99215 PR OFFICE/OUTPT VISIT, EST, LEVL V, 40-54 MIN: ICD-10-PCS | Mod: S$PBB,,, | Performed by: PSYCHIATRY & NEUROLOGY

## 2020-11-02 PROCEDURE — 99215 OFFICE O/P EST HI 40 MIN: CPT | Mod: S$PBB,,, | Performed by: PSYCHIATRY & NEUROLOGY

## 2020-11-02 RX ORDER — ALPRAZOLAM 0.25 MG/1
0.25 TABLET ORAL EVERY 8 HOURS PRN
COMMUNITY
Start: 2020-10-26 | End: 2023-04-19

## 2020-11-02 RX ORDER — SERTRALINE HYDROCHLORIDE 50 MG/1
50 TABLET, FILM COATED ORAL DAILY
Qty: 30 TABLET | Refills: 11
Start: 2020-11-02 | End: 2022-01-24

## 2020-11-02 NOTE — Clinical Note
Pt had become more feeble in recent months; her  providing much care; not really walking much;  she has ; any chance  has services to help in home?

## 2020-11-02 NOTE — PROGRESS NOTES
Subjective:          Patient ID: Marie Lejeune is a 70 y.o. female who presents today for a fit-in clinic visit for MS.  She is accompanied by her  and her daughter.     MS HPI:  · DMT: glatiramer acetate  · Side effects from DMT? No  · Taking vitamin D3 as recommended? Yes -   · Pt seen at a fit in today--has not beee well;   · Having severe shoulder pain--has gotten much worse over the past few months.    · She also is having anxiety and depression; anxiety is severe;   · There is some confusion;    · Her PCP Dr. Angelo does not recommend shoulder surgery right now given her worsening anxiety and depressions--is concerned she may not have successful rehab in her current state.   · The dilemma is that she is in so much pain from the shoulder that the pain is driving her severe anxiety    Medications:  Current Outpatient Medications   Medication Sig    ALPRAZolam (XANAX) 0.25 MG tablet Take 0.25 mg by mouth every 8 (eight) hours as needed.    amLODIPine (NORVASC) 10 MG tablet Take 10 mg by mouth once daily.    ascorbic acid (VITAMIN C) 500 MG tablet Take 500 mg by mouth once daily.    atenolol (TENORMIN) 50 MG tablet Take 25 mg by mouth once daily.    biotin 300 mcg Tab PATIENT NOT CERTAIN ON THE DOSAGE. TAKE ONE capsule by MOUTH daily    calcium carbonate (OS-ANISHA) 600 mg (1,500 mg) Tab Take 600 mg by mouth 2 (two) times daily with meals.    co-enzyme Q-10 30 mg capsule Take 400 mg by mouth once daily.    cyanocobalamin (VITAMIN B-12) 500 MCG tablet Take 500 mcg by mouth once daily.    dalfampridine 10 mg Tb12 TAKE 1 TABLET EVERY 12 HOURS    ergocalciferol (VITAMIN D2) 50,000 unit Cap Take 5,000 Units by mouth once daily.     fish oil-omega-3 fatty acids 300-1,000 mg capsule Take 1,000 g by mouth once daily.    FLUoxetine 20 MG capsule Take 20 mg by mouth once daily.    glatiramer (GLATOPA) 40 mg/mL injection INJECT 40 MG UNDER THE SKIN THREE TIMES A WEEK    hydroCHLOROthiazide (MICROZIDE)  12.5 mg capsule Take 12.5 mg by mouth once daily.    losartan (COZAAR) 100 MG tablet     melatonin 5 mg Tab Take by mouth.    modafiniL (PROVIGIL) 200 MG Tab Take 1 tablet (200 mg total) by mouth 2 (two) times daily.    multivitamin (ONE DAILY MULTIVITAMIN) per tablet Take 1 tablet by mouth once daily.    simvastatin (ZOCOR) 40 MG tablet Take 40 mg by mouth every evening.    urea (CARMOL) 40 % Crea urea 40 % topical cream   APPLY CREAM TOPICALLY TO CALLUSES FOR SOFTENING TWICE DAILY     No current facility-administered medications for this visit.        SOCIAL HISTORY  Social History     Tobacco Use    Smoking status: Never Smoker    Smokeless tobacco: Never Used   Substance Use Topics    Alcohol use: Not on file    Drug use: Not on file       Living arrangements - the patient lives with their spouse.    ROS:    REVIEW OF SYMPTOMS 11/22/2019   Do you feel abnormally tired on most days? Yes   Do you feel you generally sleep well? Yes   Do you have difficulty controlling your bladder?  No   Do you have difficulty controlling your bowels?  No   Do you have frequent muscle cramps, tightness or spasms in your limbs?  No   Do you have new visual symptoms?  No   Do you have worsening difficulty with your memory or thinking? No   Do you have worsening symptoms of anxiety or depression?  No   For patients who walk, Do you have more difficulty walking?  Yes   Have you fallen since your last visit?  No   For patients who use wheelchairs: Do you have any skin wounds or breakdown? Not Applicable   Do you have difficulty using your hands?  No   Do you have shooting or burning pain? No   Do you have difficulty with sexual function?  No   If you are sexually active, are you using birth control? Y/N  N/A Not Applicable   Do you often choke when swallowing liquids or solid food?  No   Do you experience worsening symptoms when overheated? Yes   Do you need any new equipment such as a wheelchair, walker or shower chair? No    Do you receive co-pay financial assistance for your principal MS medicine? Yes   Would you be interested in participating in an MS research trial in the future? No   Do you feel you have adequate family/friend support?  Yes   Do you have health insurance?   Yes   Are you currently employed? No   Do you receive SSDI/SSI?  No   Do you use marijuana or cannabis products? No   Have you been diagnosed with a urinary tract infection since your last visit here? No   Have you been diagnosed with a respiratory tract infection since your last visit here? No   Have you been to the emergency room since your last visit here? No   Have you been hospitalized since your last visit here?  No                Objective:        Neurologic Exam  Deferred walk;   Pt in wheelchair  MS: anxious; defers to her daughter;   Motor: limited ROM right shoulder--severe pain    Imaging:       Results for orders placed during the hospital encounter of 09/11/20   MRI Brain Demyelinating W W/O Contrast    Impression There is no significant change from prior.  Continued scattered small punctate sized foci of T2 FLAIR signal abnormality supra infratentorial parenchyma in light of history concerning for mild moderate degree of prior demyelinating plaque.    No definite new lesion or enhancing lesion to suggest significant interval or active demyelination allowing for differences in technique.    No evidence for acute infarction or intracranial enhancing mass lesion.    Clinical correlation and follow-up advised      Electronically signed by: Justin Coronel DO  Date:    09/11/2020  Time:    12:19     No results found for this or any previous visit.  No results found for this or any previous visit.      Labs:     Lab Results   Component Value Date    [id number] 98 (H) 01/22/2020    [id number] 137 (H) 05/07/2018    QUWCLNBL09CY >96 (A) 07/14/2016     Lab Results   Component Value Date    JCVINDEX 2.87 (A) 07/14/2016    JCVANTIBODY Positive (A)  07/14/2016     Lab Results   Component Value Date    EY5LWYHP 64.8 11/14/2016    ABSOLUTECD3 654 (L) 11/14/2016    FH8ZKCMN 20.5 11/14/2016    ABSOLUTECD8 207 11/14/2016    TE8ISEGF 44.1 11/14/2016    ABSOLUTECD4 445 11/14/2016    LABCD48 2.15 11/14/2016     Lab Results   Component Value Date    WBC 7.41 09/02/2020    RBC 3.93 (L) 09/02/2020    HGB 12.1 09/02/2020    HCT 35.8 (L) 09/02/2020    MCV 91 09/02/2020    MCH 30.8 09/02/2020    MCHC 33.8 09/02/2020    RDW 12.9 09/02/2020     09/02/2020    MPV 10.7 09/02/2020    GRAN 5.3 09/02/2020    GRAN 72.0 09/02/2020    LYMPH 1.3 09/02/2020    LYMPH 18.1 09/02/2020    MONO 0.6 09/02/2020    MONO 7.8 09/02/2020    EOS 0.1 09/02/2020    BASO 0.05 09/02/2020    EOSINOPHIL 1.1 09/02/2020    BASOPHIL 0.7 09/02/2020     Sodium   Date Value Ref Range Status   09/02/2020 133 (L) 136 - 145 mmol/L Final     Potassium   Date Value Ref Range Status   09/02/2020 3.4 (L) 3.5 - 5.1 mmol/L Final     Chloride   Date Value Ref Range Status   09/02/2020 96 95 - 110 mmol/L Final     CO2   Date Value Ref Range Status   09/02/2020 28 23 - 29 mmol/L Final     Glucose   Date Value Ref Range Status   09/02/2020 151 (H) 70 - 110 mg/dL Final     BUN   Date Value Ref Range Status   09/02/2020 18 8 - 23 mg/dL Final     Creatinine   Date Value Ref Range Status   09/02/2020 0.8 0.5 - 1.4 mg/dL Final     Calcium   Date Value Ref Range Status   09/02/2020 9.5 8.7 - 10.5 mg/dL Final     Total Protein   Date Value Ref Range Status   09/02/2020 7.4 6.0 - 8.4 g/dL Final     Albumin   Date Value Ref Range Status   09/02/2020 3.8 3.5 - 5.2 g/dL Final     Total Bilirubin   Date Value Ref Range Status   09/02/2020 0.4 0.1 - 1.0 mg/dL Final     Comment:     For infants and newborns, interpretation of results should be based  on gestational age, weight and in agreement with clinical  observations.  Premature Infant recommended reference ranges:  Up to 24 hours.............<8.0 mg/dL  Up to 48  hours............<12.0 mg/dL  3-5 days..................<15.0 mg/dL  6-29 days.................<15.0 mg/dL       Alkaline Phosphatase   Date Value Ref Range Status   09/02/2020 73 55 - 135 U/L Final     AST   Date Value Ref Range Status   09/02/2020 25 10 - 40 U/L Final     ALT   Date Value Ref Range Status   09/02/2020 24 10 - 44 U/L Final     Anion Gap   Date Value Ref Range Status   09/02/2020 9 8 - 16 mmol/L Final     eGFR if    Date Value Ref Range Status   09/02/2020 >60 >60 mL/min/1.73 m^2 Final     eGFR if non    Date Value Ref Range Status   09/02/2020 >60 >60 mL/min/1.73 m^2 Final     Comment:     Calculation used to obtain the estimated glomerular filtration  rate (eGFR) is the CKD-EPI equation.        No results found for: HEPBSAG, HEPBSAB, HEPBCAB    MS Impression and Plan:     NEURO MULTIPLE SCLEROSIS IMPRESSION:   MS Status:     Number of relapses in the past year?:  0    Clinical Progression:  Worsened    MRI Progression:  Stable  Plan:     DMT:  No change in management    Symptom Management:  Implement change in symptom management    Implement Change in Symptom Management:  Mood (Pt has severe anxiety; will d/c Ampyra and decrease Provigil by 1/2; refer to psychaitry)    Overall, I agree with her PCP; that said, her shoulder pain is driving her depression/anxiety and I think we should do everything we can to improve her mood in short term so that she can proceed with surgery and have successful outcome    Our visit today lasted 40 minutes, and 100% of this time was spent face to face with the patient. Over 50% of this visit included discussion of the treatment plan/medication changes/symptom management/exam findings/imaging results/coordination of care. The patient agrees with the plan of care.    Problem List Items Addressed This Visit        Unprioritized    Counseling regarding goals of care    Neurologic gait dysfunction    Anxiety - Primary    Relevant Orders     TSH (Completed)    T4 (Completed)    Ambulatory referral/consult to Psychiatry    MS (multiple sclerosis)      Other Visit Diagnoses     Depression, unspecified depression type        Relevant Orders    Ambulatory referral/consult to Psychiatry    Osteoarthritis of right shoulder, unspecified osteoarthritis type              Veena Joshua MD

## 2021-01-27 ENCOUNTER — PATIENT MESSAGE (OUTPATIENT)
Dept: PHARMACY | Facility: CLINIC | Age: 71
End: 2021-01-27

## 2021-01-28 ENCOUNTER — IMMUNIZATION (OUTPATIENT)
Dept: PHARMACY | Facility: CLINIC | Age: 71
End: 2021-01-28
Payer: MEDICARE

## 2021-01-28 DIAGNOSIS — Z23 NEED FOR VACCINATION: Primary | ICD-10-CM

## 2021-02-05 ENCOUNTER — PATIENT MESSAGE (OUTPATIENT)
Dept: NEUROLOGY | Facility: CLINIC | Age: 71
End: 2021-02-05

## 2021-02-09 ENCOUNTER — PATIENT MESSAGE (OUTPATIENT)
Dept: PSYCHIATRY | Facility: CLINIC | Age: 71
End: 2021-02-09

## 2021-02-09 ENCOUNTER — OFFICE VISIT (OUTPATIENT)
Dept: PSYCHIATRY | Facility: CLINIC | Age: 71
End: 2021-02-09
Payer: MEDICARE

## 2021-02-09 ENCOUNTER — OFFICE VISIT (OUTPATIENT)
Dept: NEUROLOGY | Facility: CLINIC | Age: 71
End: 2021-02-09
Payer: MEDICARE

## 2021-02-09 VITALS
TEMPERATURE: 99 F | BODY MASS INDEX: 34.2 KG/M2 | HEART RATE: 81 BPM | WEIGHT: 174.19 LBS | HEIGHT: 60 IN | DIASTOLIC BLOOD PRESSURE: 80 MMHG | SYSTOLIC BLOOD PRESSURE: 145 MMHG

## 2021-02-09 DIAGNOSIS — Z29.89 PROPHYLACTIC IMMUNOTHERAPY: Primary | ICD-10-CM

## 2021-02-09 DIAGNOSIS — R53.82 CHRONIC FATIGUE: ICD-10-CM

## 2021-02-09 DIAGNOSIS — G47.33 OSA (OBSTRUCTIVE SLEEP APNEA): ICD-10-CM

## 2021-02-09 DIAGNOSIS — F41.9 ANXIETY: ICD-10-CM

## 2021-02-09 DIAGNOSIS — G35 MULTIPLE SCLEROSIS: ICD-10-CM

## 2021-02-09 DIAGNOSIS — R26.9 ABNORMALITY OF GAIT: ICD-10-CM

## 2021-02-09 DIAGNOSIS — F32.A DEPRESSION, UNSPECIFIED DEPRESSION TYPE: ICD-10-CM

## 2021-02-09 DIAGNOSIS — R26.9 GAIT DISTURBANCE: ICD-10-CM

## 2021-02-09 DIAGNOSIS — Z71.89 COUNSELING REGARDING GOALS OF CARE: ICD-10-CM

## 2021-02-09 PROCEDURE — 90791 PSYCH DIAGNOSTIC EVALUATION: CPT | Mod: 95,,, | Performed by: SOCIAL WORKER

## 2021-02-09 PROCEDURE — 99215 PR OFFICE/OUTPT VISIT, EST, LEVL V, 40-54 MIN: ICD-10-PCS | Mod: S$PBB,,, | Performed by: PSYCHIATRY & NEUROLOGY

## 2021-02-09 PROCEDURE — 99999 PR PBB SHADOW E&M-EST. PATIENT-LVL III: ICD-10-PCS | Mod: PBBFAC,,, | Performed by: PSYCHIATRY & NEUROLOGY

## 2021-02-09 PROCEDURE — 99999 PR PBB SHADOW E&M-EST. PATIENT-LVL III: CPT | Mod: PBBFAC,,, | Performed by: PSYCHIATRY & NEUROLOGY

## 2021-02-09 PROCEDURE — 99213 OFFICE O/P EST LOW 20 MIN: CPT | Mod: PBBFAC | Performed by: PSYCHIATRY & NEUROLOGY

## 2021-02-09 PROCEDURE — 99215 OFFICE O/P EST HI 40 MIN: CPT | Mod: S$PBB,,, | Performed by: PSYCHIATRY & NEUROLOGY

## 2021-02-09 PROCEDURE — 90791 PR PSYCHIATRIC DIAGNOSTIC EVALUATION: ICD-10-PCS | Mod: 95,,, | Performed by: SOCIAL WORKER

## 2021-02-09 RX ORDER — MODAFINIL 100 MG/1
100 TABLET ORAL DAILY
Qty: 30 TABLET | Refills: 5 | Status: SHIPPED | OUTPATIENT
Start: 2021-02-09 | End: 2021-05-10 | Stop reason: SDUPTHER

## 2021-02-24 ENCOUNTER — TELEPHONE (OUTPATIENT)
Dept: PSYCHIATRY | Facility: CLINIC | Age: 71
End: 2021-02-24

## 2021-02-25 ENCOUNTER — IMMUNIZATION (OUTPATIENT)
Dept: PHARMACY | Facility: CLINIC | Age: 71
End: 2021-02-25

## 2021-02-25 DIAGNOSIS — Z23 NEED FOR VACCINATION: Primary | ICD-10-CM

## 2021-05-10 DIAGNOSIS — G35 MULTIPLE SCLEROSIS: ICD-10-CM

## 2021-05-10 DIAGNOSIS — R53.82 CHRONIC FATIGUE: ICD-10-CM

## 2021-05-10 DIAGNOSIS — G47.33 OSA (OBSTRUCTIVE SLEEP APNEA): ICD-10-CM

## 2021-05-12 RX ORDER — MODAFINIL 100 MG/1
100 TABLET ORAL DAILY
Qty: 30 TABLET | Refills: 5 | Status: SHIPPED | OUTPATIENT
Start: 2021-05-12 | End: 2021-06-09 | Stop reason: SDUPTHER

## 2021-05-31 ENCOUNTER — PATIENT MESSAGE (OUTPATIENT)
Dept: PSYCHIATRY | Facility: CLINIC | Age: 71
End: 2021-05-31

## 2021-06-04 ENCOUNTER — TELEPHONE (OUTPATIENT)
Dept: NEUROLOGY | Facility: CLINIC | Age: 71
End: 2021-06-04

## 2021-06-09 ENCOUNTER — LAB VISIT (OUTPATIENT)
Dept: LAB | Facility: HOSPITAL | Age: 71
End: 2021-06-09
Payer: MEDICARE

## 2021-06-09 ENCOUNTER — OFFICE VISIT (OUTPATIENT)
Dept: NEUROLOGY | Facility: CLINIC | Age: 71
End: 2021-06-09
Payer: MEDICARE

## 2021-06-09 VITALS
WEIGHT: 170 LBS | SYSTOLIC BLOOD PRESSURE: 145 MMHG | DIASTOLIC BLOOD PRESSURE: 71 MMHG | HEIGHT: 60 IN | BODY MASS INDEX: 33.38 KG/M2 | HEART RATE: 77 BPM

## 2021-06-09 DIAGNOSIS — Z71.89 COUNSELING REGARDING GOALS OF CARE: ICD-10-CM

## 2021-06-09 DIAGNOSIS — G35 MULTIPLE SCLEROSIS: ICD-10-CM

## 2021-06-09 DIAGNOSIS — N31.9 NEUROGENIC BLADDER: ICD-10-CM

## 2021-06-09 DIAGNOSIS — E55.9 VITAMIN D INSUFFICIENCY: ICD-10-CM

## 2021-06-09 DIAGNOSIS — G47.33 OSA (OBSTRUCTIVE SLEEP APNEA): ICD-10-CM

## 2021-06-09 DIAGNOSIS — G35 MULTIPLE SCLEROSIS: Primary | ICD-10-CM

## 2021-06-09 DIAGNOSIS — R26.9 NEUROLOGIC GAIT DYSFUNCTION: ICD-10-CM

## 2021-06-09 DIAGNOSIS — R53.82 CHRONIC FATIGUE: ICD-10-CM

## 2021-06-09 DIAGNOSIS — I10 ESSENTIAL HYPERTENSION: ICD-10-CM

## 2021-06-09 LAB — 25(OH)D3+25(OH)D2 SERPL-MCNC: 68 NG/ML (ref 30–96)

## 2021-06-09 PROCEDURE — 99999 PR PBB SHADOW E&M-EST. PATIENT-LVL IV: CPT | Mod: PBBFAC,,, | Performed by: PHYSICIAN ASSISTANT

## 2021-06-09 PROCEDURE — 99215 OFFICE O/P EST HI 40 MIN: CPT | Mod: S$PBB,,, | Performed by: PHYSICIAN ASSISTANT

## 2021-06-09 PROCEDURE — 99215 PR OFFICE/OUTPT VISIT, EST, LEVL V, 40-54 MIN: ICD-10-PCS | Mod: S$PBB,,, | Performed by: PHYSICIAN ASSISTANT

## 2021-06-09 PROCEDURE — 99214 OFFICE O/P EST MOD 30 MIN: CPT | Mod: PBBFAC | Performed by: PHYSICIAN ASSISTANT

## 2021-06-09 PROCEDURE — 82306 VITAMIN D 25 HYDROXY: CPT | Performed by: PHYSICIAN ASSISTANT

## 2021-06-09 PROCEDURE — 36415 COLL VENOUS BLD VENIPUNCTURE: CPT | Performed by: PHYSICIAN ASSISTANT

## 2021-06-09 PROCEDURE — 99999 PR PBB SHADOW E&M-EST. PATIENT-LVL IV: ICD-10-PCS | Mod: PBBFAC,,, | Performed by: PHYSICIAN ASSISTANT

## 2021-06-09 RX ORDER — MODAFINIL 100 MG/1
100 TABLET ORAL DAILY
Qty: 90 TABLET | Refills: 1 | Status: SHIPPED | OUTPATIENT
Start: 2021-06-09 | End: 2021-12-09

## 2021-06-09 RX ORDER — OXYBUTYNIN CHLORIDE 5 MG/1
5 TABLET ORAL NIGHTLY
Qty: 30 TABLET | Refills: 0 | Status: SHIPPED | OUTPATIENT
Start: 2021-06-09 | End: 2021-06-30

## 2021-06-14 ENCOUNTER — CLINICAL SUPPORT (OUTPATIENT)
Dept: REHABILITATION | Facility: HOSPITAL | Age: 71
End: 2021-06-14
Payer: MEDICARE

## 2021-06-14 DIAGNOSIS — R68.89 DECREASED STRENGTH, ENDURANCE, AND MOBILITY: ICD-10-CM

## 2021-06-14 DIAGNOSIS — R26.9 NEUROLOGIC GAIT DYSFUNCTION: ICD-10-CM

## 2021-06-14 DIAGNOSIS — Z74.09 DECREASED STRENGTH, ENDURANCE, AND MOBILITY: ICD-10-CM

## 2021-06-14 DIAGNOSIS — R53.1 DECREASED STRENGTH, ENDURANCE, AND MOBILITY: ICD-10-CM

## 2021-06-14 DIAGNOSIS — G35 MULTIPLE SCLEROSIS: ICD-10-CM

## 2021-06-14 DIAGNOSIS — R26.89 BALANCE PROBLEM: ICD-10-CM

## 2021-06-14 PROCEDURE — 97161 PT EVAL LOW COMPLEX 20 MIN: CPT | Mod: PN

## 2021-06-15 PROBLEM — R53.1 DECREASED STRENGTH, ENDURANCE, AND MOBILITY: Status: ACTIVE | Noted: 2021-06-15

## 2021-06-15 PROBLEM — R68.89 DECREASED STRENGTH, ENDURANCE, AND MOBILITY: Status: ACTIVE | Noted: 2021-06-15

## 2021-06-15 PROBLEM — R26.89 BALANCE PROBLEM: Status: ACTIVE | Noted: 2021-06-15

## 2021-06-15 PROBLEM — Z74.09 DECREASED STRENGTH, ENDURANCE, AND MOBILITY: Status: ACTIVE | Noted: 2021-06-15

## 2021-07-06 ENCOUNTER — CLINICAL SUPPORT (OUTPATIENT)
Dept: REHABILITATION | Facility: HOSPITAL | Age: 71
End: 2021-07-06
Payer: MEDICARE

## 2021-07-06 DIAGNOSIS — R26.89 BALANCE PROBLEM: ICD-10-CM

## 2021-07-06 DIAGNOSIS — R68.89 DECREASED STRENGTH, ENDURANCE, AND MOBILITY: ICD-10-CM

## 2021-07-06 DIAGNOSIS — R53.1 DECREASED STRENGTH, ENDURANCE, AND MOBILITY: ICD-10-CM

## 2021-07-06 DIAGNOSIS — Z74.09 DECREASED STRENGTH, ENDURANCE, AND MOBILITY: ICD-10-CM

## 2021-07-06 PROCEDURE — 97112 NEUROMUSCULAR REEDUCATION: CPT | Mod: PN

## 2021-07-06 PROCEDURE — 97116 GAIT TRAINING THERAPY: CPT | Mod: PN

## 2021-07-06 PROCEDURE — 97110 THERAPEUTIC EXERCISES: CPT | Mod: PN

## 2021-07-09 ENCOUNTER — CLINICAL SUPPORT (OUTPATIENT)
Dept: REHABILITATION | Facility: HOSPITAL | Age: 71
End: 2021-07-09
Payer: MEDICARE

## 2021-07-09 DIAGNOSIS — R68.89 DECREASED STRENGTH, ENDURANCE, AND MOBILITY: ICD-10-CM

## 2021-07-09 DIAGNOSIS — R26.89 BALANCE PROBLEM: ICD-10-CM

## 2021-07-09 DIAGNOSIS — Z74.09 DECREASED STRENGTH, ENDURANCE, AND MOBILITY: ICD-10-CM

## 2021-07-09 DIAGNOSIS — R53.1 DECREASED STRENGTH, ENDURANCE, AND MOBILITY: ICD-10-CM

## 2021-07-09 PROCEDURE — 97112 NEUROMUSCULAR REEDUCATION: CPT | Mod: PN

## 2021-07-09 PROCEDURE — 97110 THERAPEUTIC EXERCISES: CPT | Mod: PN

## 2021-07-09 PROCEDURE — 97116 GAIT TRAINING THERAPY: CPT | Mod: PN

## 2021-07-12 ENCOUNTER — CLINICAL SUPPORT (OUTPATIENT)
Dept: REHABILITATION | Facility: HOSPITAL | Age: 71
End: 2021-07-12
Payer: MEDICARE

## 2021-07-12 DIAGNOSIS — R26.89 BALANCE PROBLEM: ICD-10-CM

## 2021-07-12 DIAGNOSIS — R68.89 DECREASED STRENGTH, ENDURANCE, AND MOBILITY: ICD-10-CM

## 2021-07-12 DIAGNOSIS — R53.1 DECREASED STRENGTH, ENDURANCE, AND MOBILITY: ICD-10-CM

## 2021-07-12 DIAGNOSIS — Z74.09 DECREASED STRENGTH, ENDURANCE, AND MOBILITY: ICD-10-CM

## 2021-07-12 PROCEDURE — 97110 THERAPEUTIC EXERCISES: CPT | Mod: PN

## 2021-07-12 PROCEDURE — 97116 GAIT TRAINING THERAPY: CPT | Mod: PN

## 2021-07-12 PROCEDURE — 97112 NEUROMUSCULAR REEDUCATION: CPT | Mod: PN

## 2021-07-14 ENCOUNTER — CLINICAL SUPPORT (OUTPATIENT)
Dept: REHABILITATION | Facility: HOSPITAL | Age: 71
End: 2021-07-14
Payer: MEDICARE

## 2021-07-14 DIAGNOSIS — Z74.09 DECREASED STRENGTH, ENDURANCE, AND MOBILITY: ICD-10-CM

## 2021-07-14 DIAGNOSIS — R68.89 DECREASED STRENGTH, ENDURANCE, AND MOBILITY: ICD-10-CM

## 2021-07-14 DIAGNOSIS — R26.89 BALANCE PROBLEM: ICD-10-CM

## 2021-07-14 DIAGNOSIS — R53.1 DECREASED STRENGTH, ENDURANCE, AND MOBILITY: ICD-10-CM

## 2021-07-14 PROCEDURE — 97110 THERAPEUTIC EXERCISES: CPT | Mod: PN

## 2021-07-14 PROCEDURE — 97112 NEUROMUSCULAR REEDUCATION: CPT | Mod: PN

## 2021-07-19 ENCOUNTER — CLINICAL SUPPORT (OUTPATIENT)
Dept: REHABILITATION | Facility: HOSPITAL | Age: 71
End: 2021-07-19
Payer: MEDICARE

## 2021-07-19 DIAGNOSIS — Z74.09 DECREASED STRENGTH, ENDURANCE, AND MOBILITY: ICD-10-CM

## 2021-07-19 DIAGNOSIS — R68.89 DECREASED STRENGTH, ENDURANCE, AND MOBILITY: ICD-10-CM

## 2021-07-19 DIAGNOSIS — R26.89 BALANCE PROBLEM: ICD-10-CM

## 2021-07-19 DIAGNOSIS — R53.1 DECREASED STRENGTH, ENDURANCE, AND MOBILITY: ICD-10-CM

## 2021-07-19 PROCEDURE — 97112 NEUROMUSCULAR REEDUCATION: CPT | Mod: PN

## 2021-07-19 PROCEDURE — 97110 THERAPEUTIC EXERCISES: CPT | Mod: PN

## 2021-07-21 ENCOUNTER — CLINICAL SUPPORT (OUTPATIENT)
Dept: REHABILITATION | Facility: HOSPITAL | Age: 71
End: 2021-07-21
Payer: MEDICARE

## 2021-07-21 DIAGNOSIS — R68.89 DECREASED STRENGTH, ENDURANCE, AND MOBILITY: ICD-10-CM

## 2021-07-21 DIAGNOSIS — R53.1 DECREASED STRENGTH, ENDURANCE, AND MOBILITY: ICD-10-CM

## 2021-07-21 DIAGNOSIS — R26.89 BALANCE PROBLEM: ICD-10-CM

## 2021-07-21 DIAGNOSIS — Z74.09 DECREASED STRENGTH, ENDURANCE, AND MOBILITY: ICD-10-CM

## 2021-07-21 PROCEDURE — 97112 NEUROMUSCULAR REEDUCATION: CPT | Mod: PN

## 2021-07-21 PROCEDURE — 97110 THERAPEUTIC EXERCISES: CPT | Mod: PN

## 2021-07-28 ENCOUNTER — CLINICAL SUPPORT (OUTPATIENT)
Dept: REHABILITATION | Facility: HOSPITAL | Age: 71
End: 2021-07-28
Payer: MEDICARE

## 2021-07-28 DIAGNOSIS — R26.89 BALANCE PROBLEM: ICD-10-CM

## 2021-07-28 DIAGNOSIS — Z74.09 DECREASED STRENGTH, ENDURANCE, AND MOBILITY: ICD-10-CM

## 2021-07-28 DIAGNOSIS — R68.89 DECREASED STRENGTH, ENDURANCE, AND MOBILITY: ICD-10-CM

## 2021-07-28 DIAGNOSIS — R53.1 DECREASED STRENGTH, ENDURANCE, AND MOBILITY: ICD-10-CM

## 2021-07-28 PROCEDURE — 97110 THERAPEUTIC EXERCISES: CPT | Mod: PN

## 2021-07-28 PROCEDURE — 97112 NEUROMUSCULAR REEDUCATION: CPT | Mod: PN

## 2021-08-05 ENCOUNTER — PATIENT MESSAGE (OUTPATIENT)
Dept: NEUROLOGY | Facility: CLINIC | Age: 71
End: 2021-08-05

## 2021-08-05 ENCOUNTER — CLINICAL SUPPORT (OUTPATIENT)
Dept: REHABILITATION | Facility: HOSPITAL | Age: 71
End: 2021-08-05
Payer: MEDICARE

## 2021-08-05 DIAGNOSIS — R53.1 DECREASED STRENGTH, ENDURANCE, AND MOBILITY: ICD-10-CM

## 2021-08-05 DIAGNOSIS — R68.89 DECREASED STRENGTH, ENDURANCE, AND MOBILITY: ICD-10-CM

## 2021-08-05 DIAGNOSIS — Z74.09 DECREASED STRENGTH, ENDURANCE, AND MOBILITY: ICD-10-CM

## 2021-08-05 DIAGNOSIS — R26.89 BALANCE PROBLEM: ICD-10-CM

## 2021-08-05 PROCEDURE — 97112 NEUROMUSCULAR REEDUCATION: CPT | Mod: PN

## 2021-08-05 PROCEDURE — 97110 THERAPEUTIC EXERCISES: CPT | Mod: PN

## 2021-08-11 ENCOUNTER — CLINICAL SUPPORT (OUTPATIENT)
Dept: REHABILITATION | Facility: HOSPITAL | Age: 71
End: 2021-08-11
Payer: MEDICARE

## 2021-08-11 DIAGNOSIS — R26.89 BALANCE PROBLEM: ICD-10-CM

## 2021-08-11 DIAGNOSIS — R68.89 DECREASED STRENGTH, ENDURANCE, AND MOBILITY: ICD-10-CM

## 2021-08-11 DIAGNOSIS — Z74.09 DECREASED STRENGTH, ENDURANCE, AND MOBILITY: ICD-10-CM

## 2021-08-11 DIAGNOSIS — R53.1 DECREASED STRENGTH, ENDURANCE, AND MOBILITY: ICD-10-CM

## 2021-08-11 PROCEDURE — 97112 NEUROMUSCULAR REEDUCATION: CPT | Mod: PN

## 2021-08-11 PROCEDURE — 97110 THERAPEUTIC EXERCISES: CPT | Mod: PN

## 2021-08-18 ENCOUNTER — CLINICAL SUPPORT (OUTPATIENT)
Dept: REHABILITATION | Facility: HOSPITAL | Age: 71
End: 2021-08-18
Payer: MEDICARE

## 2021-08-18 DIAGNOSIS — R68.89 DECREASED STRENGTH, ENDURANCE, AND MOBILITY: ICD-10-CM

## 2021-08-18 DIAGNOSIS — R26.89 BALANCE PROBLEM: ICD-10-CM

## 2021-08-18 DIAGNOSIS — Z74.09 DECREASED STRENGTH, ENDURANCE, AND MOBILITY: ICD-10-CM

## 2021-08-18 DIAGNOSIS — R53.1 DECREASED STRENGTH, ENDURANCE, AND MOBILITY: ICD-10-CM

## 2021-08-18 PROCEDURE — 97112 NEUROMUSCULAR REEDUCATION: CPT | Mod: PN

## 2021-08-18 PROCEDURE — 97110 THERAPEUTIC EXERCISES: CPT | Mod: PN

## 2021-08-25 ENCOUNTER — HOSPITAL ENCOUNTER (EMERGENCY)
Facility: HOSPITAL | Age: 71
Discharge: HOME | End: 2021-08-25
Attending: EMERGENCY MEDICINE
Payer: MEDICARE

## 2021-08-25 VITALS
HEART RATE: 66 BPM | HEIGHT: 60 IN | WEIGHT: 162 LBS | DIASTOLIC BLOOD PRESSURE: 70 MMHG | BODY MASS INDEX: 31.8 KG/M2 | RESPIRATION RATE: 17 BRPM | SYSTOLIC BLOOD PRESSURE: 168 MMHG | OXYGEN SATURATION: 97 % | TEMPERATURE: 98 F

## 2021-08-25 DIAGNOSIS — S09.90XA INJURY OF HEAD, INITIAL ENCOUNTER: ICD-10-CM

## 2021-08-25 DIAGNOSIS — S01.01XA LACERATION OF OCCIPITAL SCALP, INITIAL ENCOUNTER: Primary | ICD-10-CM

## 2021-08-25 PROCEDURE — 12001 RPR S/N/AX/GEN/TRNK 2.5CM/<: CPT | Mod: ER

## 2021-08-25 PROCEDURE — 99284 EMERGENCY DEPT VISIT MOD MDM: CPT | Mod: 25,ER

## 2021-08-26 ENCOUNTER — PES CALL (OUTPATIENT)
Dept: ADMINISTRATIVE | Facility: CLINIC | Age: 71
End: 2021-08-26

## 2021-09-01 ENCOUNTER — PATIENT MESSAGE (OUTPATIENT)
Dept: PSYCHIATRY | Facility: CLINIC | Age: 71
End: 2021-09-01

## 2021-09-02 ENCOUNTER — PATIENT MESSAGE (OUTPATIENT)
Dept: PSYCHIATRY | Facility: CLINIC | Age: 71
End: 2021-09-02

## 2021-09-04 ENCOUNTER — PATIENT MESSAGE (OUTPATIENT)
Dept: NEUROLOGY | Facility: CLINIC | Age: 71
End: 2021-09-04

## 2021-12-21 ENCOUNTER — PATIENT MESSAGE (OUTPATIENT)
Dept: NEUROLOGY | Facility: CLINIC | Age: 71
End: 2021-12-21
Payer: MEDICARE

## 2022-01-24 ENCOUNTER — LAB VISIT (OUTPATIENT)
Dept: LAB | Facility: HOSPITAL | Age: 72
End: 2022-01-24
Attending: PSYCHIATRY & NEUROLOGY
Payer: MEDICARE

## 2022-01-24 ENCOUNTER — OFFICE VISIT (OUTPATIENT)
Dept: NEUROLOGY | Facility: CLINIC | Age: 72
End: 2022-01-24
Payer: MEDICARE

## 2022-01-24 VITALS
BODY MASS INDEX: 31.64 KG/M2 | SYSTOLIC BLOOD PRESSURE: 148 MMHG | HEIGHT: 60 IN | HEART RATE: 68 BPM | DIASTOLIC BLOOD PRESSURE: 80 MMHG

## 2022-01-24 DIAGNOSIS — G35 MULTIPLE SCLEROSIS: ICD-10-CM

## 2022-01-24 DIAGNOSIS — Z29.89 PROPHYLACTIC IMMUNOTHERAPY: ICD-10-CM

## 2022-01-24 DIAGNOSIS — R26.9 NEUROLOGIC GAIT DYSFUNCTION: ICD-10-CM

## 2022-01-24 DIAGNOSIS — G35 MS (MULTIPLE SCLEROSIS): Primary | ICD-10-CM

## 2022-01-24 DIAGNOSIS — N31.9 NEUROGENIC BLADDER: ICD-10-CM

## 2022-01-24 DIAGNOSIS — R26.9 ABNORMALITY OF GAIT: ICD-10-CM

## 2022-01-24 DIAGNOSIS — G35 MS (MULTIPLE SCLEROSIS): ICD-10-CM

## 2022-01-24 LAB
ALBUMIN SERPL BCP-MCNC: 3.8 G/DL (ref 3.5–5.2)
ALP SERPL-CCNC: 71 U/L (ref 55–135)
ALT SERPL W/O P-5'-P-CCNC: 17 U/L (ref 10–44)
ANION GAP SERPL CALC-SCNC: 8 MMOL/L (ref 8–16)
AST SERPL-CCNC: 20 U/L (ref 10–40)
BASOPHILS # BLD AUTO: 0.05 K/UL (ref 0–0.2)
BASOPHILS NFR BLD: 0.7 % (ref 0–1.9)
BILIRUB SERPL-MCNC: 0.5 MG/DL (ref 0.1–1)
BUN SERPL-MCNC: 25 MG/DL (ref 8–23)
CALCIUM SERPL-MCNC: 10.3 MG/DL (ref 8.7–10.5)
CHLORIDE SERPL-SCNC: 103 MMOL/L (ref 95–110)
CO2 SERPL-SCNC: 27 MMOL/L (ref 23–29)
CREAT SERPL-MCNC: 0.8 MG/DL (ref 0.5–1.4)
DIFFERENTIAL METHOD: ABNORMAL
EOSINOPHIL # BLD AUTO: 0.3 K/UL (ref 0–0.5)
EOSINOPHIL NFR BLD: 3.7 % (ref 0–8)
ERYTHROCYTE [DISTWIDTH] IN BLOOD BY AUTOMATED COUNT: 13.4 % (ref 11.5–14.5)
EST. GFR  (AFRICAN AMERICAN): >60 ML/MIN/1.73 M^2
EST. GFR  (NON AFRICAN AMERICAN): >60 ML/MIN/1.73 M^2
GLUCOSE SERPL-MCNC: 103 MG/DL (ref 70–110)
HCT VFR BLD AUTO: 39.4 % (ref 37–48.5)
HGB BLD-MCNC: 12.9 G/DL (ref 12–16)
IMM GRANULOCYTES # BLD AUTO: 0.02 K/UL (ref 0–0.04)
IMM GRANULOCYTES NFR BLD AUTO: 0.3 % (ref 0–0.5)
LYMPHOCYTES # BLD AUTO: 1.3 K/UL (ref 1–4.8)
LYMPHOCYTES NFR BLD: 17.6 % (ref 18–48)
MCH RBC QN AUTO: 30.4 PG (ref 27–31)
MCHC RBC AUTO-ENTMCNC: 32.7 G/DL (ref 32–36)
MCV RBC AUTO: 93 FL (ref 82–98)
MONOCYTES # BLD AUTO: 0.7 K/UL (ref 0.3–1)
MONOCYTES NFR BLD: 9.2 % (ref 4–15)
NEUTROPHILS # BLD AUTO: 5.1 K/UL (ref 1.8–7.7)
NEUTROPHILS NFR BLD: 68.5 % (ref 38–73)
NRBC BLD-RTO: 0 /100 WBC
PLATELET # BLD AUTO: 285 K/UL (ref 150–450)
PMV BLD AUTO: 10.9 FL (ref 9.2–12.9)
POTASSIUM SERPL-SCNC: 4.4 MMOL/L (ref 3.5–5.1)
PROT SERPL-MCNC: 8.1 G/DL (ref 6–8.4)
RBC # BLD AUTO: 4.24 M/UL (ref 4–5.4)
SODIUM SERPL-SCNC: 138 MMOL/L (ref 136–145)
WBC # BLD AUTO: 7.38 K/UL (ref 3.9–12.7)

## 2022-01-24 PROCEDURE — 36415 COLL VENOUS BLD VENIPUNCTURE: CPT | Performed by: PSYCHIATRY & NEUROLOGY

## 2022-01-24 PROCEDURE — 85025 COMPLETE CBC W/AUTO DIFF WBC: CPT | Performed by: PSYCHIATRY & NEUROLOGY

## 2022-01-24 PROCEDURE — 80053 COMPREHEN METABOLIC PANEL: CPT | Performed by: PSYCHIATRY & NEUROLOGY

## 2022-01-24 PROCEDURE — 99999 PR PBB SHADOW E&M-EST. PATIENT-LVL III: ICD-10-PCS | Mod: PBBFAC,,, | Performed by: PSYCHIATRY & NEUROLOGY

## 2022-01-24 PROCEDURE — 99215 OFFICE O/P EST HI 40 MIN: CPT | Mod: S$PBB,,, | Performed by: PSYCHIATRY & NEUROLOGY

## 2022-01-24 PROCEDURE — 99215 PR OFFICE/OUTPT VISIT, EST, LEVL V, 40-54 MIN: ICD-10-PCS | Mod: S$PBB,,, | Performed by: PSYCHIATRY & NEUROLOGY

## 2022-01-24 PROCEDURE — 99999 PR PBB SHADOW E&M-EST. PATIENT-LVL III: CPT | Mod: PBBFAC,,, | Performed by: PSYCHIATRY & NEUROLOGY

## 2022-01-24 PROCEDURE — 99213 OFFICE O/P EST LOW 20 MIN: CPT | Mod: PBBFAC | Performed by: PSYCHIATRY & NEUROLOGY

## 2022-01-24 RX ORDER — SERTRALINE HYDROCHLORIDE 50 MG/1
50 TABLET, FILM COATED ORAL DAILY
Qty: 30 TABLET | Refills: 11
Start: 2022-01-24 | End: 2023-04-19 | Stop reason: SDUPTHER

## 2022-01-24 RX ORDER — DALFAMPRIDINE 10 MG/1
1 TABLET, FILM COATED, EXTENDED RELEASE ORAL EVERY 12 HOURS
Qty: 180 TABLET | Refills: 3 | Status: SHIPPED | OUTPATIENT
Start: 2022-01-24 | End: 2022-08-01 | Stop reason: SDUPTHER

## 2022-01-24 RX ORDER — ATENOLOL 25 MG/1
25 TABLET ORAL 2 TIMES DAILY
COMMUNITY
Start: 2022-01-03

## 2022-01-24 NOTE — PROGRESS NOTES
"Subjective:          Patient ID: Marie Lejeune is a 71 y.o. female who presents today for a routine clinic visit for MS.      MS HPI:  · DMT: glatiramer acetate  · Side effects from DMT? No  · Taking vitamin D3 as recommended? Yes - stable;   · Wondering is she can get back on Ampyra; no seizures  · She did do PT last summer --found it helpful  · No sense of relapse  · Does her exercise bike "every day of her life"   · Not taking Ampyra   · Only taking one xanax per day--prescribed by Dr. Rao    Medications:  Current Outpatient Medications   Medication Sig    ALPRAZolam (XANAX) 0.25 MG tablet Take 0.25 mg by mouth every 8 (eight) hours as needed.    amLODIPine (NORVASC) 10 MG tablet Take 10 mg by mouth once daily.    ascorbic acid, vitamin C, (VITAMIN C) 500 MG tablet Take 500 mg by mouth once daily.    atenoloL (TENORMIN) 25 MG tablet Take 25 mg by mouth 2 (two) times daily.    biotin 300 mcg Tab PATIENT NOT CERTAIN ON THE DOSAGE. TAKE ONE capsule by MOUTH daily    calcium carbonate (OS-ANISHA) 600 mg (1,500 mg) Tab Take 600 mg by mouth 2 (two) times daily with meals.    co-enzyme Q-10 30 mg capsule Take 400 mg by mouth once daily.    cyanocobalamin 500 MCG tablet Take 500 mcg by mouth once daily.    dalfampridine 10 mg Tb12 TAKE 1 TABLET EVERY 12 HOURS    ergocalciferol (VITAMIN D2) 50,000 unit Cap Take 5,000 Units by mouth once daily.     fish oil-omega-3 fatty acids 300-1,000 mg capsule Take 1,000 g by mouth once daily.    GLATOPA 40 mg/mL injection INJECT 40 MG UNDER THE SKIN THREE TIMES A WEEK    hydroCHLOROthiazide (MICROZIDE) 12.5 mg capsule Take 12.5 mg by mouth once daily.    losartan (COZAAR) 100 MG tablet     modafiniL (PROVIGIL) 100 MG Tab TAKE 1 TABLET DAILY    oxybutynin (DITROPAN) 5 MG Tab TAKE 1 TABLET BY MOUTH ONCE DAILY IN THE EVENING    simvastatin (ZOCOR) 40 MG tablet Take 40 mg by mouth every evening.     No current facility-administered medications for this visit. "       SOCIAL HISTORY  Social History     Tobacco Use    Smoking status: Never Smoker    Smokeless tobacco: Never Used       Living arrangements - the patient lives with their family.      REVIEW OF SYMPTOMS 1/24/2022   Do you feel abnormally tired on most days? No   Do you feel you generally sleep well? Yes   Do you have difficulty controlling your bladder?  Yes   Do you have difficulty controlling your bowels?  No   Do you have frequent muscle cramps, tightness or spasms in your limbs?  No   Do you have new visual symptoms?  No   Do you have worsening difficulty with your memory or thinking? No   Do you have worsening symptoms of anxiety or depression?  No   For patients who walk, Do you have more difficulty walking?  Yes   Have you fallen since your last visit?  No   For patients who use wheelchairs: Do you have any skin wounds or breakdown? Not Applicable   Do you have difficulty using your hands?  No   Do you have shooting or burning pain? No   Do you have difficulty with sexual function?  No   If you are sexually active, are you using birth control? Y/N  N/A Not Applicable   Do you often choke when swallowing liquids or solid food?  No   Do you experience worsening symptoms when overheated? No   Do you need any new equipment such as a wheelchair, walker or shower chair? No   Do you receive co-pay financial assistance for your principal MS medicine? No   Would you be interested in participating in an MS research trial in the future? No   For patients on Gilenya, Tecfidera, Aubagio, Rituxan, Ocrevus, Tysabri, Lemtrada or Methotrexate, are you aware that you should NOT receive live virus vaccines?  Not Applicable   Do you feel you have adequate family/friend support?  Yes   Do you have health insurance?   Yes   Are you currently employed? No   Do you receive SSDI/SSI?  No   Do you use marijuana or cannabis products? No   Have you been diagnosed with a urinary tract infection since your last visit here? No   Have  you been diagnosed with a respiratory tract infection since your last visit here? No   Have you been to the emergency room since your last visit here? No   Have you been hospitalized since your last visit here?  No            Objective:        Timed 25 Foot Walk: 6/9/2021 1/24/2022   Did patient wear an AFO? No No   Was assistive device used? Yes Yes   Assistive device used (cira one): Bilateral Assistance Bilateral Assistance   Bilateral device used Walker/Rollator Walker/Rollator   Time for 25 Foot Walk (seconds) 20.4 19         Neurologic Exam    MS: intact,   CN: no dysarthria or facial asymmetry;   No LASHON  MOTOR: LLE 4/5 in flexors;   REFLEXES 3+ t/o  GAIT: slow, walker; mild hyperextension right knee with right circumduction;  Rollator       Imaging:       Results for orders placed during the hospital encounter of 09/11/20    MRI Brain Demyelinating W W/O Contrast    Impression  There is no significant change from prior.  Continued scattered small punctate sized foci of T2 FLAIR signal abnormality supra infratentorial parenchyma in light of history concerning for mild moderate degree of prior demyelinating plaque.    No definite new lesion or enhancing lesion to suggest significant interval or active demyelination allowing for differences in technique.    No evidence for acute infarction or intracranial enhancing mass lesion.    Clinical correlation and follow-up advised      Electronically signed by: Justin Coronel DO  Date:    09/11/2020  Time:    12:19    No results found for this or any previous visit.    No results found for this or any previous visit.        Labs:     Lab Results   Component Value Date    AGMMKLYY65GT 68 06/09/2021    BSDMZYBG16HQ 98 (H) 01/22/2020    INTRBFBD48QL 137 (H) 05/07/2018     Lab Results   Component Value Date    JCVINDEX 2.87 (A) 07/14/2016    JCVANTIBODY Positive (A) 07/14/2016     Lab Results   Component Value Date    UA0TZPDP 64.8 11/14/2016    ABSOLUTECD3 654 (L)  11/14/2016    HO2PXQOY 20.5 11/14/2016    ABSOLUTECD8 207 11/14/2016    CS0LEABL 44.1 11/14/2016    ABSOLUTECD4 445 11/14/2016    LABCD48 2.15 11/14/2016     Lab Results   Component Value Date    WBC 7.38 01/24/2022    RBC 4.24 01/24/2022    HGB 12.9 01/24/2022    HCT 39.4 01/24/2022    MCV 93 01/24/2022    MCH 30.4 01/24/2022    MCHC 32.7 01/24/2022    RDW 13.4 01/24/2022     01/24/2022    MPV 10.9 01/24/2022    GRAN 5.1 01/24/2022    GRAN 68.5 01/24/2022    LYMPH 1.3 01/24/2022    LYMPH 17.6 (L) 01/24/2022    MONO 0.7 01/24/2022    MONO 9.2 01/24/2022    EOS 0.3 01/24/2022    BASO 0.05 01/24/2022    EOSINOPHIL 3.7 01/24/2022    BASOPHIL 0.7 01/24/2022     Sodium   Date Value Ref Range Status   01/24/2022 138 136 - 145 mmol/L Final     Potassium   Date Value Ref Range Status   01/24/2022 4.4 3.5 - 5.1 mmol/L Final     Chloride   Date Value Ref Range Status   01/24/2022 103 95 - 110 mmol/L Final     CO2   Date Value Ref Range Status   01/24/2022 27 23 - 29 mmol/L Final     Glucose   Date Value Ref Range Status   01/24/2022 103 70 - 110 mg/dL Final     BUN   Date Value Ref Range Status   01/24/2022 25 (H) 8 - 23 mg/dL Final     Creatinine   Date Value Ref Range Status   01/24/2022 0.8 0.5 - 1.4 mg/dL Final     Calcium   Date Value Ref Range Status   01/24/2022 10.3 8.7 - 10.5 mg/dL Final     Total Protein   Date Value Ref Range Status   01/24/2022 8.1 6.0 - 8.4 g/dL Final     Albumin   Date Value Ref Range Status   01/24/2022 3.8 3.5 - 5.2 g/dL Final     Total Bilirubin   Date Value Ref Range Status   01/24/2022 0.5 0.1 - 1.0 mg/dL Final     Comment:     For infants and newborns, interpretation of results should be based  on gestational age, weight and in agreement with clinical  observations.    Premature Infant recommended reference ranges:  Up to 24 hours.............<8.0 mg/dL  Up to 48 hours............<12.0 mg/dL  3-5 days..................<15.0 mg/dL  6-29 days.................<15.0 mg/dL       Alkaline  Phosphatase   Date Value Ref Range Status   01/24/2022 71 55 - 135 U/L Final     AST   Date Value Ref Range Status   01/24/2022 20 10 - 40 U/L Final     ALT   Date Value Ref Range Status   01/24/2022 17 10 - 44 U/L Final     Anion Gap   Date Value Ref Range Status   01/24/2022 8 8 - 16 mmol/L Final     eGFR if    Date Value Ref Range Status   01/24/2022 >60.0 >60 mL/min/1.73 m^2 Final     eGFR if non    Date Value Ref Range Status   01/24/2022 >60.0 >60 mL/min/1.73 m^2 Final     Comment:     Calculation used to obtain the estimated glomerular filtration  rate (eGFR) is the CKD-EPI equation.        No results found for: HEPBSAG, HEPBSAB, HEPBCAB        MS Impression and Plan:     NEURO MULTIPLE SCLEROSIS IMPRESSION:   MS Status:     Number of relapses in the past year?:  0    Clinical Progression:  Clinically Stable    MRI Progression:  N/A  Plan:     DMT:  No change in management    Symptom Management:  Implement change in symptom management    Implement Change in Symptom Management:  Gait (Will restart Ampyra; will check BMP; no hx seizure)     Next Imaging Due: 9/24/2022     Next Labs Due: 1/24/2022        Write Ampyra after LABS today      Problem List Items Addressed This Visit        Unprioritized    Prophylactic immunotherapy    Multiple sclerosis    Relevant Medications    dalfampridine 10 mg Tb12    Abnormality of gait    Relevant Medications    dalfampridine 10 mg Tb12      Other Visit Diagnoses     MS (multiple sclerosis)    -  Primary    Relevant Orders    Comprehensive Metabolic Panel (Completed)    CBC Auto Differential (Completed)    Neurogenic bladder        Neurologic gait dysfunction              Veena Joshua MD     I spent a total of 40 minutes on the day of the visit.This includes face to face time and non-face to face time preparing to see the patient (eg, review of tests), obtaining and/or reviewing separately obtained history, documenting clinical  information in the electronic or other health record, independently interpreting results and communicating results to the patient/family/caregiver, or care coordinator.

## 2022-02-17 ENCOUNTER — TELEPHONE (OUTPATIENT)
Dept: NEUROLOGY | Facility: CLINIC | Age: 72
End: 2022-02-17
Payer: MEDICARE

## 2022-02-28 ENCOUNTER — PATIENT MESSAGE (OUTPATIENT)
Dept: PSYCHIATRY | Facility: CLINIC | Age: 72
End: 2022-02-28
Payer: MEDICARE

## 2022-06-01 ENCOUNTER — PATIENT MESSAGE (OUTPATIENT)
Dept: NEUROLOGY | Facility: CLINIC | Age: 72
End: 2022-06-01
Payer: MEDICARE

## 2022-06-01 DIAGNOSIS — R26.9 GAIT DISTURBANCE: ICD-10-CM

## 2022-06-01 DIAGNOSIS — G35 MS (MULTIPLE SCLEROSIS): Primary | ICD-10-CM

## 2022-06-24 ENCOUNTER — PATIENT MESSAGE (OUTPATIENT)
Dept: PSYCHIATRY | Facility: CLINIC | Age: 72
End: 2022-06-24
Payer: MEDICARE

## 2022-07-12 ENCOUNTER — PATIENT MESSAGE (OUTPATIENT)
Dept: NEUROLOGY | Facility: CLINIC | Age: 72
End: 2022-07-12
Payer: MEDICARE

## 2022-08-01 ENCOUNTER — LAB VISIT (OUTPATIENT)
Dept: LAB | Facility: HOSPITAL | Age: 72
End: 2022-08-01
Payer: MEDICARE

## 2022-08-01 ENCOUNTER — OFFICE VISIT (OUTPATIENT)
Dept: NEUROLOGY | Facility: CLINIC | Age: 72
End: 2022-08-01
Payer: MEDICARE

## 2022-08-01 ENCOUNTER — TELEPHONE (OUTPATIENT)
Dept: NEUROLOGY | Facility: CLINIC | Age: 72
End: 2022-08-01
Payer: MEDICARE

## 2022-08-01 ENCOUNTER — PATIENT MESSAGE (OUTPATIENT)
Dept: NEUROLOGY | Facility: CLINIC | Age: 72
End: 2022-08-01

## 2022-08-01 VITALS
BODY MASS INDEX: 31.64 KG/M2 | DIASTOLIC BLOOD PRESSURE: 70 MMHG | SYSTOLIC BLOOD PRESSURE: 115 MMHG | HEART RATE: 70 BPM | HEIGHT: 60 IN

## 2022-08-01 DIAGNOSIS — G35 MULTIPLE SCLEROSIS: ICD-10-CM

## 2022-08-01 DIAGNOSIS — R26.9 ABNORMALITY OF GAIT: ICD-10-CM

## 2022-08-01 DIAGNOSIS — Z79.899 OTHER LONG TERM (CURRENT) DRUG THERAPY: ICD-10-CM

## 2022-08-01 DIAGNOSIS — G35 MULTIPLE SCLEROSIS: Primary | ICD-10-CM

## 2022-08-01 LAB
25(OH)D3+25(OH)D2 SERPL-MCNC: 90 NG/ML (ref 30–96)
ANION GAP SERPL CALC-SCNC: 10 MMOL/L (ref 8–16)
BUN SERPL-MCNC: 28 MG/DL (ref 8–23)
CALCIUM SERPL-MCNC: 10.2 MG/DL (ref 8.7–10.5)
CHLORIDE SERPL-SCNC: 101 MMOL/L (ref 95–110)
CO2 SERPL-SCNC: 28 MMOL/L (ref 23–29)
CREAT SERPL-MCNC: 1 MG/DL (ref 0.5–1.4)
EST. GFR  (NO RACE VARIABLE): 59.5 ML/MIN/1.73 M^2
GLUCOSE SERPL-MCNC: 96 MG/DL (ref 70–110)
POTASSIUM SERPL-SCNC: 3.9 MMOL/L (ref 3.5–5.1)
SODIUM SERPL-SCNC: 139 MMOL/L (ref 136–145)

## 2022-08-01 PROCEDURE — 80048 BASIC METABOLIC PNL TOTAL CA: CPT | Performed by: CLINICAL NURSE SPECIALIST

## 2022-08-01 PROCEDURE — 99999 PR PBB SHADOW E&M-EST. PATIENT-LVL IV: ICD-10-PCS | Mod: PBBFAC,,, | Performed by: CLINICAL NURSE SPECIALIST

## 2022-08-01 PROCEDURE — 99214 OFFICE O/P EST MOD 30 MIN: CPT | Mod: PBBFAC | Performed by: CLINICAL NURSE SPECIALIST

## 2022-08-01 PROCEDURE — 99999 PR PBB SHADOW E&M-EST. PATIENT-LVL IV: CPT | Mod: PBBFAC,,, | Performed by: CLINICAL NURSE SPECIALIST

## 2022-08-01 PROCEDURE — 99215 OFFICE O/P EST HI 40 MIN: CPT | Mod: S$PBB,,, | Performed by: CLINICAL NURSE SPECIALIST

## 2022-08-01 PROCEDURE — 82306 VITAMIN D 25 HYDROXY: CPT | Performed by: CLINICAL NURSE SPECIALIST

## 2022-08-01 PROCEDURE — 36415 COLL VENOUS BLD VENIPUNCTURE: CPT | Performed by: CLINICAL NURSE SPECIALIST

## 2022-08-01 PROCEDURE — 99215 PR OFFICE/OUTPT VISIT, EST, LEVL V, 40-54 MIN: ICD-10-PCS | Mod: S$PBB,,, | Performed by: CLINICAL NURSE SPECIALIST

## 2022-08-01 RX ORDER — DALFAMPRIDINE 10 MG/1
1 TABLET, FILM COATED, EXTENDED RELEASE ORAL EVERY 12 HOURS
Qty: 180 TABLET | Refills: 0 | Status: SHIPPED | OUTPATIENT
Start: 2022-08-01 | End: 2022-10-25

## 2022-08-01 NOTE — PROGRESS NOTES
Subjective:          Patient ID: Marie Lejeune is a 71 y.o. female who presents today for a routine clinic visit for MS.  She was last seen by Dr. Joshua in January 2022. The history has been provided by the patient. She is accompanied by her .       MS HPI:  · DMT: glatiramer acetate 3 times a week   · Side effects from DMT? No  · Taking vitamin D3 as recommended? Yes -  Dose: not sure of dose   · She denies any new or different symptoms.   · She uses her exercise bike daily. She also does some weight exercises.   · She had COVID in July. She feels recovered from it. She took Paxlovid.   · She had a reverse shoulder replacement in 2021.     Medications:  Current Outpatient Medications   Medication Sig    ALPRAZolam (XANAX) 0.25 MG tablet Take 0.25 mg by mouth every 8 (eight) hours as needed.    amLODIPine (NORVASC) 10 MG tablet Take 10 mg by mouth once daily.    ascorbic acid, vitamin C, (VITAMIN C) 500 MG tablet Take 500 mg by mouth once daily.    atenoloL (TENORMIN) 25 MG tablet Take 25 mg by mouth 2 (two) times daily.    biotin 300 mcg Tab PATIENT NOT CERTAIN ON THE DOSAGE. TAKE ONE capsule by MOUTH daily    calcium carbonate (OS-ANISHA) 600 mg (1,500 mg) Tab Take 600 mg by mouth 2 (two) times daily with meals.    co-enzyme Q-10 30 mg capsule Take 400 mg by mouth once daily.    cyanocobalamin 500 MCG tablet Take 500 mcg by mouth once daily.    dalfampridine 10 mg Tb12 Take 1 tablet by mouth every 12 (twelve) hours.    ergocalciferol (VITAMIN D2) 50,000 unit Cap Take 5,000 Units by mouth once daily.     fish oil-omega-3 fatty acids 300-1,000 mg capsule Take 1,000 g by mouth once daily.    GLATOPA 40 mg/mL injection INJECT 40 MG UNDER THE SKIN THREE TIMES A WEEK    hydroCHLOROthiazide (MICROZIDE) 12.5 mg capsule Take 12.5 mg by mouth once daily.    losartan (COZAAR) 100 MG tablet     modafiniL (PROVIGIL) 100 MG Tab TAKE 1 TABLET DAILY    oxybutynin (DITROPAN) 5 MG Tab TAKE 1 TABLET DAILY IN THE  EVENING    sertraline (ZOLOFT) 50 MG tablet Take 1 tablet (50 mg total) by mouth once daily.    simvastatin (ZOCOR) 40 MG tablet Take 40 mg by mouth every evening.     SOCIAL HISTORY  Social History     Tobacco Use    Smoking status: Never Smoker    Smokeless tobacco: Never Used       Living arrangements - the patient lives with their spouse.    ROS:  REVIEW OF SYMPTOMS 7/30/2022   Do you feel abnormally tired on most days? No--takes naps during the day.    Do you feel you generally sleep well? Yes--wakes up at night to urinate    Do you have difficulty controlling your bladder?  No--she does not have accidents; manageable. She denies any UTIs.    Do you have difficulty controlling your bowels?  No--exercise helps; has BM every other day    Do you have frequent muscle cramps, tightness or spasms in your limbs?  No   Do you have new visual symptoms?  No--wears glasses; sees an eye doctor annually    Do you have worsening difficulty with your memory or thinking? No--some minor short-term memory issues    Do you have worsening symptoms of anxiety or depression?  No--takes Zoloft, which is helpful.    For patients who walk, Do you have more difficulty walking?  Yes--uses scooter for distances; uses a 3 wheel walker at home.    Have you fallen since your last visit?  No   For patients who use wheelchairs: Do you have any skin wounds or breakdown? Not Applicable   Do you have difficulty using your hands?  No; she is right-handed; coordination is pretty good .    Do you have shooting or burning pain? No   Do you have difficulty with sexual function?  No   If you are sexually active, are you using birth control? Y/N  N/A Not Applicable   Do you often choke when swallowing liquids or solid food?  No--she swallows a lot of pills, so she has to take her time    Do you experience worsening symptoms when overheated? No--avoids the heat at all costs    Do you need any new equipment such as a wheelchair, walker or shower  chair? Yes   Do you receive co-pay financial assistance for your principal MS medicine? No--pays $12 for 3 months    Would you be interested in participating in an MS research trial in the future? No   For patients on Gilenya, Tecfidera, Aubagio, Rituxan, Ocrevus, Tysabri, Lemtrada or Methotrexate, are you aware that you should NOT receive live virus vaccines?  Not Applicable   Do you feel you have adequate family/friend support?  Yes   Do you have health insurance?   Yes   Are you currently employed? No   Do you receive SSDI/SSI?  No   Do you use marijuana or cannabis products? No   Have you been diagnosed with a urinary tract infection since your last visit here? No   Have you been diagnosed with a respiratory tract infection since your last visit here? COVID    Have you been to the emergency room since your last visit here? No   Have you been hospitalized since your last visit here?  No            Objective:        1. 25 foot timed walk:  Timed 25 Foot Walk: 6/9/2021 1/24/2022   Did patient wear an AFO? No No   Was assistive device used? Yes Yes   Assistive device used (cira one): Bilateral Assistance Bilateral Assistance   Bilateral device used Walker/Rollator Walker/Rollator   Time for 25 Foot Walk (seconds) 20.4 19       Neurologic Exam    In general, the patient is well nourished.    MENTAL STATUS: language is fluent, normal verbal comprehension, short-term and remote memory is intact, attention is normal, patient is alert and oriented x 3, fund of knowlege is appropriate by vocabulary.      CRANIAL NERVE EXAM:  There is no intranuclear ophthalmoplegia.  Extraocular muscles are intact. No facial asymmetry. Facial sensation is intact bilaterally. There is no dysarthria. Uvula is midline, and palate moves symmetrically. Shoulder shrug intact bilaterally. Tongue protrusion is midline. Hearing is grossly intact. Neck is supple.   Acuity OD: corrected 20/100; OS : corrected 20/50    MOTOR EXAM: Normal bulk and  tone throughout UE and LE bilaterally.  Rapid sequential movements are normal UE's, impaired LE's; Strength is 5/5 in all groups in the upper extremities; bilateral HF 5-/5, DF bilaterally 5-/5     REFLEXES: 2+ and symmetric throughout in UE extremities; 3+ patellar R, 3+ patellar L; achilles 2+ bilaterally     SENSORY EXAM: Normal to light touch, vibration impaired LEs at ankles      COORDINATION: Normal finger-to-nose exam and heel to shin.      GAIT: wide based, slight circumduction L LE, weak hip flexors.     Imaging:     Results for orders placed during the hospital encounter of 09/11/20    MRI Brain Demyelinating W W/O Contrast    Impression  There is no significant change from prior.  Continued scattered small punctate sized foci of T2 FLAIR signal abnormality supra infratentorial parenchyma in light of history concerning for mild moderate degree of prior demyelinating plaque.    No definite new lesion or enhancing lesion to suggest significant interval or active demyelination allowing for differences in technique.    No evidence for acute infarction or intracranial enhancing mass lesion.    Clinical correlation and follow-up advised      Electronically signed by: Justin Coronel DO  Date:    09/11/2020  Time:    12:19      Labs:     Lab Results   Component Value Date    LUQFFOBD38KV 68 06/09/2021    [id number] 98 (H) 01/22/2020    [id number] 137 (H) 05/07/2018     Lab Results   Component Value Date    WBC 7.38 01/24/2022    RBC 4.24 01/24/2022    HGB 12.9 01/24/2022    HCT 39.4 01/24/2022    MCV 93 01/24/2022    MCH 30.4 01/24/2022    MCHC 32.7 01/24/2022    RDW 13.4 01/24/2022     01/24/2022    MPV 10.9 01/24/2022    GRAN 5.1 01/24/2022    GRAN 68.5 01/24/2022    LYMPH 1.3 01/24/2022    LYMPH 17.6 (L) 01/24/2022    MONO 0.7 01/24/2022    MONO 9.2 01/24/2022    EOS 0.3 01/24/2022    BASO 0.05 01/24/2022    EOSINOPHIL 3.7 01/24/2022    BASOPHIL 0.7 01/24/2022     Sodium   Date Value Ref Range Status    01/24/2022 138 136 - 145 mmol/L Final     Potassium   Date Value Ref Range Status   01/24/2022 4.4 3.5 - 5.1 mmol/L Final     Chloride   Date Value Ref Range Status   01/24/2022 103 95 - 110 mmol/L Final     CO2   Date Value Ref Range Status   01/24/2022 27 23 - 29 mmol/L Final     Glucose   Date Value Ref Range Status   01/24/2022 103 70 - 110 mg/dL Final     BUN   Date Value Ref Range Status   01/24/2022 25 (H) 8 - 23 mg/dL Final     Creatinine   Date Value Ref Range Status   01/24/2022 0.8 0.5 - 1.4 mg/dL Final     Calcium   Date Value Ref Range Status   01/24/2022 10.3 8.7 - 10.5 mg/dL Final     Total Protein   Date Value Ref Range Status   01/24/2022 8.1 6.0 - 8.4 g/dL Final     Albumin   Date Value Ref Range Status   01/24/2022 3.8 3.5 - 5.2 g/dL Final     Total Bilirubin   Date Value Ref Range Status   01/24/2022 0.5 0.1 - 1.0 mg/dL Final     Comment:     For infants and newborns, interpretation of results should be based  on gestational age, weight and in agreement with clinical  observations.    Premature Infant recommended reference ranges:  Up to 24 hours.............<8.0 mg/dL  Up to 48 hours............<12.0 mg/dL  3-5 days..................<15.0 mg/dL  6-29 days.................<15.0 mg/dL       Alkaline Phosphatase   Date Value Ref Range Status   01/24/2022 71 55 - 135 U/L Final     AST   Date Value Ref Range Status   01/24/2022 20 10 - 40 U/L Final     ALT   Date Value Ref Range Status   01/24/2022 17 10 - 44 U/L Final     Anion Gap   Date Value Ref Range Status   01/24/2022 8 8 - 16 mmol/L Final     eGFR if    Date Value Ref Range Status   01/24/2022 >60.0 >60 mL/min/1.73 m^2 Final     eGFR if non    Date Value Ref Range Status   01/24/2022 >60.0 >60 mL/min/1.73 m^2 Final     Comment:     Calculation used to obtain the estimated glomerular filtration  rate (eGFR) is the CKD-EPI equation.              MS Impression and Plan:     NEURO MULTIPLE SCLEROSIS IMPRESSION:    MS Status:     Number of relapses in the past year?:  0    Clinical Progression:  Clinically Stable  Plan:     DMT:  No change in management    DMT comment:  Continue Glatopa and Vitamin D. We will check Vitamin D level today.       Symptom Management:  No change in symptom management (Continue dalfampridine. Will check kidney function for CrCl. )       MRI brain planned for September.   She will follow up with Dr. Joshua in 6 months.     Total time spent with patient: 28 minutes   Total time spent on encounter: 40 minutes       LATISHA Coles, CNS     Problem List Items Addressed This Visit        Neurologic Problems    Multiple sclerosis - Primary    Overview     Stable on Glatopa and Vit D3           Relevant Orders    Basic metabolic panel    Vitamin D    MRI Brain Demyelinating W W/O Contrast    Creatinine, serum      Other Visit Diagnoses     Other long term (current) drug therapy         Relevant Orders    Vitamin D

## 2022-08-01 NOTE — TELEPHONE ENCOUNTER
----- Message from Dejah Cordero, LATISHA, CNS sent at 8/1/2022 12:36 PM CDT -----  MRI brain in September with creatinine a few days before and f/u with BB in 6 months

## 2022-08-13 DIAGNOSIS — G35 MULTIPLE SCLEROSIS: Primary | ICD-10-CM

## 2022-08-19 ENCOUNTER — PATIENT MESSAGE (OUTPATIENT)
Dept: NEUROLOGY | Facility: CLINIC | Age: 72
End: 2022-08-19
Payer: MEDICARE

## 2022-08-23 NOTE — TELEPHONE ENCOUNTER
Responded to pt and documented in earlier encounter.    ----- Message from Christopher Aly sent at 5/10/2018 11:32 AM CDT -----  Contact: Patient @ [phone]  Patient is returning a missed call from [place]      Nsaids Pregnancy And Lactation Text: These medications are considered safe up to 30 weeks gestation. It is excreted in breast milk.

## 2022-08-30 ENCOUNTER — LAB VISIT (OUTPATIENT)
Dept: LAB | Facility: HOSPITAL | Age: 72
End: 2022-08-30
Attending: INTERNAL MEDICINE
Payer: MEDICARE

## 2022-08-30 DIAGNOSIS — G35 MULTIPLE SCLEROSIS: ICD-10-CM

## 2022-08-30 LAB
ANION GAP SERPL CALC-SCNC: 9 MMOL/L (ref 8–16)
BUN SERPL-MCNC: 24 MG/DL (ref 8–23)
CALCIUM SERPL-MCNC: 10.2 MG/DL (ref 8.7–10.5)
CHLORIDE SERPL-SCNC: 102 MMOL/L (ref 95–110)
CO2 SERPL-SCNC: 27 MMOL/L (ref 23–29)
CREAT SERPL-MCNC: 0.9 MG/DL (ref 0.5–1.4)
EST. GFR  (NO RACE VARIABLE): >60 ML/MIN/1.73 M^2
GLUCOSE SERPL-MCNC: 101 MG/DL (ref 70–110)
POTASSIUM SERPL-SCNC: 3.8 MMOL/L (ref 3.5–5.1)
SODIUM SERPL-SCNC: 138 MMOL/L (ref 136–145)

## 2022-08-30 PROCEDURE — 80048 BASIC METABOLIC PNL TOTAL CA: CPT | Performed by: CLINICAL NURSE SPECIALIST

## 2022-08-30 PROCEDURE — 36415 COLL VENOUS BLD VENIPUNCTURE: CPT | Performed by: CLINICAL NURSE SPECIALIST

## 2022-08-31 DIAGNOSIS — G35 MULTIPLE SCLEROSIS: Primary | ICD-10-CM

## 2022-09-01 ENCOUNTER — HOSPITAL ENCOUNTER (OUTPATIENT)
Dept: RADIOLOGY | Facility: HOSPITAL | Age: 72
Discharge: HOME | End: 2022-09-01
Attending: CLINICAL NURSE SPECIALIST
Payer: MEDICARE

## 2022-09-01 DIAGNOSIS — G35 MULTIPLE SCLEROSIS: ICD-10-CM

## 2022-09-01 PROCEDURE — 70553 MRI BRAIN DEMYELINATING W/ WO CONTRAST: ICD-10-PCS | Mod: 26,,, | Performed by: RADIOLOGY

## 2022-09-01 PROCEDURE — 70553 MRI BRAIN STEM W/O & W/DYE: CPT | Mod: TC

## 2022-09-01 PROCEDURE — A9585 GADOBUTROL INJECTION: HCPCS | Performed by: CLINICAL NURSE SPECIALIST

## 2022-09-01 PROCEDURE — 25500020 PHARM REV CODE 255: Performed by: CLINICAL NURSE SPECIALIST

## 2022-09-01 PROCEDURE — 70553 MRI BRAIN STEM W/O & W/DYE: CPT | Mod: 26,,, | Performed by: RADIOLOGY

## 2022-09-01 RX ORDER — GADOBUTROL 604.72 MG/ML
7.5 INJECTION INTRAVENOUS
Status: COMPLETED | OUTPATIENT
Start: 2022-09-01 | End: 2022-09-01

## 2022-09-01 RX ADMIN — GADOBUTROL 7.5 ML: 604.72 INJECTION INTRAVENOUS at 12:09

## 2022-09-09 ENCOUNTER — CLINICAL SUPPORT (OUTPATIENT)
Dept: REHABILITATION | Facility: HOSPITAL | Age: 72
End: 2022-09-09
Attending: PSYCHIATRY & NEUROLOGY
Payer: MEDICARE

## 2022-09-09 DIAGNOSIS — R68.89 DECREASED STRENGTH, ENDURANCE, AND MOBILITY: ICD-10-CM

## 2022-09-09 DIAGNOSIS — R53.1 DECREASED STRENGTH, ENDURANCE, AND MOBILITY: ICD-10-CM

## 2022-09-09 DIAGNOSIS — R26.9 ABNORMALITY OF GAIT: ICD-10-CM

## 2022-09-09 DIAGNOSIS — Z74.09 DECREASED STRENGTH, ENDURANCE, AND MOBILITY: ICD-10-CM

## 2022-09-09 DIAGNOSIS — R26.9 GAIT DISTURBANCE: ICD-10-CM

## 2022-09-09 DIAGNOSIS — G35 MS (MULTIPLE SCLEROSIS): ICD-10-CM

## 2022-09-09 DIAGNOSIS — R26.89 BALANCE PROBLEM: Primary | ICD-10-CM

## 2022-09-09 PROCEDURE — 97161 PT EVAL LOW COMPLEX 20 MIN: CPT | Mod: PN

## 2022-09-09 PROCEDURE — 97530 THERAPEUTIC ACTIVITIES: CPT | Mod: PN

## 2022-09-09 NOTE — PROGRESS NOTES
See initial evaluation in Plan of Care.     Makeda Martinez, PT, DPT, NCS  Neurological Physical Therapist  9/9/22

## 2022-09-11 NOTE — PLAN OF CARE
[facility]  Physical Therapy Neurological Rehabilitation Initial Evaluation    Name: Marie Lejeune  Clinic Number: [id number]    Therapy Diagnosis:   Encounter Diagnoses   Name Primary?    MS (multiple sclerosis)     Gait disturbance     Balance problem Yes    Decreased strength, endurance, and mobility     Abnormality of gait      Physician: Veena Joshua MD    Physician Orders: PT Eval and Treat MS  Medical Diagnosis from Referral: MS (multiple sclerosis) [G35], Gait disturbance [R26.9]  Evaluation Date: 2022  Authorization Period Expiration: 23  Plan of Care Expiration: 22  Visit # / Visits authorized:     Time In: 12:30  Time Out: 1:15   Total Billable Time: 45 minutes    Precautions: Standard, Fall, and Multiple Sclerosis - do not fatigue     Subjective   Date of onset: in her 50's   History of current condition - Maryann reports: walking and balance trouble as of late.     Multiple Sclerosis:   Medications are the same as previous  No flare ups   Biking at home - 7 days/wk for 20 minutes - gets her moving, no fatigue with this    Right shoulder - reversal surgery recent      Medical History:   Past Medical History:   Diagnosis Date    Hypertension     MS (multiple sclerosis)     Multiple sclerosis      Surgical History:   Marie Lejeune  has a past surgical history that includes  section (1978); parcial hysterectomy; and Cholecystectomy ().    Medications:   Maryann has a current medication list which includes the following prescription(s): alprazolam, amlodipine, ascorbic acid (vitamin c), atenolol, biotin, calcium carbonate, co-enzyme q-10, cyanocobalamin, dalfampridine, vitamin d2, fish oil-omega-3 fatty acids, glatopa, hydrochlorothiazide, losartan, modafinil, oxybutynin, sertraline, and simvastatin.    Allergies:   Review of patient's allergies indicates:  No Known Allergies     Imaging, MRI studies: 22    Prior Therapy: OP PT for Multiple  "Sclerosis   Social History: spouse   Falls: no falls    DME: Rollator and shower chair built in and grab bars     Home Environment: several levels, 1 steps to enter main entrance    Exercise Routine / History: bike as above    Family Present at time of Eval: none    Occupation: retired   Prior Level of Function: independent   Current Level of Function: Mary Ann - Sosa (rollator)     Pain:  Current 7/10, worst 6/10, best 0/10   Location: general   Description: Aching  Aggravating Factors: exertion, heat   Easing Factors: rest , cool    Pts goals: maintain and "do better" with walking and balance     Objective     - Follows commands: intact    - Speech: no deficits    Mental status: alert, oriented to person, place, and time, normal mood, behavior, speech, dress, motor activity, and thought processes  Appearance: Casually dressed  Behavior:  calm, cooperative, and adequate rapport can be established  Attention Span and Concentration:  Normal    Dominant hand:  right     Posture Alignment :slouched posture    Skin integrity:  Intact    Sensation:  Light Touch: Intact           Proprioception:   Intact    Tone: 1+ Slight increases in muscle tone, manifested by a catch, followed by minimal resistance throughout the remainder (less than half) of the ROM.  Limbs/muscles affected: left lower extremity - knee extensors     Visual/Auditory: denies changes     Coordination:   - fine motor: intact   - UE coordination: finger to nose: intact     - LE coordination:  alternating toe taps: intact - mild decline in speed on left     RANGE OF MOTION--LOWER EXTREMITIES  (R) LE Hip: full   Knee: full   Ankle: normal    (L) LE: Hip: full   Knee: full   Ankle: normal    Strength: manual muscle test grades below   Lower Extremity Strength   RLE LLE   Hip Flexion: 3/5 3/5   Hip Extension:  Can bridge  Can bridge    Hip Abduction: 5/5 5/5   Hip Adduction: 4+/5 4/5   Knee Extension: 5/5 5/5   Knee Flexion: 4+/5 4/5   Ankle Dorsiflexion: 4/5 " 3+/5     Abdominal Strength: 4/5     Evaluation   Single Limb Stance R LE NT  (<10 sec = HIGH FALL RISK)   Single Limb Stance L LE NT  (<10 sec = HIGH FALL RISK)   5 times sit-stand NT  >12 sec= fall risk for general elderly  >16 sec= fall risk for Parkinson's disease  >10 sec= balance/vestibular dysfunction (<61 y/o)  >14.2 sec= balance/vestibular dysfunction (>61 y/o)  >12 sec= fall risk for CVA   Hopper/ FGA/ Tinetti NT     Postural control:  MCTSIB:  1. Eyes Open/feet together/Firm: NT seconds  2. Eyes Closed/feet together/Firm: NT seconds  3. Eyes Open/feet together/Foam: NT seconds  4. Eyes Closed/feet together/Foam: NT seconds    Gait Assessment:   - AD used: rollator   - Assistance: SBA - Sosa   - Distance: 50 feet     GAIT DEVIATIONS:  Gait component performance: decreased speed, decreased step length and stride length, increased step width, increased weightshift from left to right without trunk rotation or hip rotation; bilateral lower extremities with external rotation, no toe off or heel strike; rollator height increased and arms extended at 90 degrees of flexion      Endurance Deficit: severe       Evaluation   Timed Up and Go 53 sec  < 20 sec safe for independent transfers,     < 30 sec assist required for transfers   6 meter walk test 0.11 m/s   6 min walk test NT     Functional Mobility (Bed mobility, transfers)  Bed mobility: Min A  Supine to sit: Min A  Sit to supine: Min A  Rolling: Min A  Transfers to bed: Min A  Transfers to toilet: Min A  Sit to stand:  Min A  Stand pivot:  Min A  Car transfers: Min A  Wheelchair mobility: NT  Floor transfers: NT     CMS Impairment/Limitation/Restriction for FOTO Multiple Sclerosis Survey    Therapist reviewed FOTO scores for Marie Lejeune on 9/9/2022.   FOTO documents entered into [employer] - see Media section.    Limitation Score: 60%  Category: Mobility         TREATMENT   Treatment Time In: 1:00  Treatment Time Out: 1:15  Total Treatment time separate from  Evaluation: 15 minutes    Management of symptoms of overheating due to heat in clinic  Ice packs and Ice water   Increased time in sitting, sitting restbreaks throughout    Home Exercises and Patient Education Provided    Education provided:   - role of PT, plan of care (POC), scheduling    Written Home Exercises Provided: Patient instructed to cont prior HEP.  Exercises were reviewed and Maryann was able to demonstrate them prior to the end of the session.  Maryann demonstrated fair  understanding of the education provided.     See EMR under Patient Instructions for exercises provided at future visits and at previous bout of therapy visits.    Assessment   Maryann is a 71 y.o. female referred to outpatient Physical Therapy with a medical diagnosis of MS (multiple sclerosis) [G35], Gait disturbance [R26.9]. Pt presents with significant impairment of gait speed, gait mechanics, functional mobility, strength and endurance as well as impaired static and dynamic balance. Pt demonstrating significant influence of heat in gym this date with ice packs and ice water with several restbreaks throughout. Testing is kept to a minimum this date as well as change in position as this is very taxing on the patient. Pt notes she is an avid recumbent biker and notes fatigue does not usually impact her daily mobility. Pt spouse is very helpful and assists patient throughout daily tasks/mobility.       Pt prognosis is Fair.   Pt will benefit from skilled outpatient Physical Therapy to address the deficits stated above and in the chart below, provide pt/family education, and to maximize pt's level of independence.     Plan of care discussed with patient: Yes  Pt's spiritual, cultural and educational needs considered and patient is agreeable to the plan of care and goals as stated below:     Anticipated Barriers for therapy: disease progression, scheduling    Medical Necessity is demonstrated by the following  History  Co-morbidities and  personal factors that may impact the plan of care Co-morbidities:   advanced age, anxiety, depression, HTN, and immunosuppression    Personal Factors:   age  lifestyle     high   Examination  Body Structures and Functions, activity limitations and participation restrictions that may impact the plan of care Body Regions:   lower extremities  upper extremities  trunk    Body Systems:    strength  gross coordinated movement  balance  gait  transfers  transitions  motor control    Participation Restrictions:   [place] - Sosa with rollator and  assist for all mobility     Activity limitations:   Learning and applying knowledge  no deficits    General Tasks and Commands  undertaking multiple tasks    Communication  no deficits    Mobility  lifting and carrying objects  walking  moving around using equipment (WC)  driving (bike, car, motorcycle)    Self care  [place] - SOSA all tasks    Domestic Life  [place] all tasks    Interactions/Relationships  no deficits    Life Areas  no deficits    Community and Social Life  community life  recreation and leisure         high   Clinical Presentation stable and uncomplicated low   Decision Making/ Complexity Score: low     Goals:  Short Term Goals: 4 weeks   1. Pt will perform all bed mobility tasks with CGA or better so that they can improve trunk control and decrease caregiver burden.   2. Patient will complete stand pivot transfer with CGA and appropriate assistive device with good body mechanics to promote safe functional transfers.   3. Patient will transfer sit to/from standing with CGA and appropriate assistive device with good body mechanics to promote safe functional transfers.   4. Patient to tolerate performance of 5 times sit to stand to demonstrate improvement in lower extremity endurance.   5. Patient to tolerate MCTSIB testing/completion of testing to track balance and improvements in static balance.   6. Patient to perform Timed Up and Go at 47 seconds  demonstrating improvement in functional mobility.     Long Term Goals: 8 weeks   1. Pt will perform all bed mobility tasks with SPV or better so that they can improve trunk control and decrease caregiver burden.   2. Patient will be SBA with home exercise program (HEP) to promote continued rehabilitation following discharge.  3. Patient will complete stand pivot transfer with SBA and appropriate assistive device with good body mechanics to promote safe functional transfers.    4. Patient will transfer sit to/from standing with SBA and appropriate assistive device with good body mechanics to promote safe functional transfers.   6. Patient to perform Timed Up and Go at 41 seconds demonstrating improvement in functional mobility.       Plan   Plan of care Certification: 9/9/2022 to 11/4/22.    Outpatient Physical Therapy 2 times weekly for 8 weeks to include the following interventions: Gait Training, Manual Therapy, Moist Heat/ Ice, Neuromuscular Re-ed, Patient Education, Therapeutic Activities, and Therapeutic Exercise.     Makeda Martinez, PT, DPT, NCS  9/9/22

## 2022-09-16 ENCOUNTER — CLINICAL SUPPORT (OUTPATIENT)
Dept: REHABILITATION | Facility: HOSPITAL | Age: 72
End: 2022-09-16
Payer: MEDICARE

## 2022-09-16 DIAGNOSIS — R26.89 BALANCE PROBLEM: Primary | ICD-10-CM

## 2022-09-16 DIAGNOSIS — R53.1 DECREASED STRENGTH, ENDURANCE, AND MOBILITY: ICD-10-CM

## 2022-09-16 DIAGNOSIS — R68.89 DECREASED STRENGTH, ENDURANCE, AND MOBILITY: ICD-10-CM

## 2022-09-16 DIAGNOSIS — R26.9 ABNORMALITY OF GAIT: ICD-10-CM

## 2022-09-16 DIAGNOSIS — Z74.09 DECREASED STRENGTH, ENDURANCE, AND MOBILITY: ICD-10-CM

## 2022-09-16 PROCEDURE — 97110 THERAPEUTIC EXERCISES: CPT | Mod: PN

## 2022-09-16 NOTE — PROGRESS NOTES
[facility]   Physical Therapy Treatment Note     Name: Marie Lejeune  [facility] Number: [id number]    Therapy Diagnosis:   Encounter Diagnoses   Name Primary?    Balance problem Yes    Decreased strength, endurance, and mobility     Abnormality of gait      Physician: Veena Joshua MD    Visit Date: 9/16/2022    Physician Orders: PT Eval and Treat MS  Medical Diagnosis from Referral: MS (multiple sclerosis) [G35], Gait disturbance [R26.9]  Evaluation Date: 9/9/2022  Authorization Period Expiration: 8/22/23  Plan of Care Expiration: 11/4/22  Visit # / Visits authorized: 1/ 1, 1/25    PTA Visit #: 0/5     Time In: 1:15  Time Out: 2:00  Total Billable Time: 45 minutes    Precautions: Standard, Fall, and Multiple Sclerosis - do not fatigue     SUBJECTIVE     Pt reports: doing well, nothing new to report.    She was compliant with home exercise program.  Response to previous treatment: fine  Functional change: ongoing     Pain: 0/10  Location: NA      OBJECTIVE     Objective Measures updated at progress report unless specified.     Treatment     Maryann received the treatments listed below:      therapeutic exercises to develop strength, endurance, ROM, posture, and core stabilization for 45 minutes including:    Nustep 4 min, 4 min, BUE/BLE - Level 1     Supine:    Bridges 10x2 - 7/10 fatigue, 6/10 fatigue    SLR 5x2 - 4/10 fatigue, 5/10 fatigue, 5/10 fatigue, 5/10 fatigue    Hip ABD 10x2 - 5/10 fatigue, 6/10 fatigue, 5/10 fatigue, 6/10 fatigue   Hip ADD in hooklying - blue ball at bilateral knees 10x2 - 6/10 fatigue    Sit to stand 5x3 - 4/10 fatigue, 5/10 fatigue, 6/10 fatigue       Patient Education and Home Exercises     Home Exercises Provided and Patient Education Provided     Education provided:   - role of PT, plan of care (POC), scheduling     Written Home Exercises Provided: Patient instructed to cont prior HEP.  Exercises were reviewed and Maryann was able to demonstrate them prior to  the end of the session.  Maryann demonstrated fair  understanding of the education provided.      See EMR under Patient Instructions for exercises provided at future visits and at previous bout of therapy visits.    ASSESSMENT   Maryann demonstrating increased fatigue which is disease process limiting at this time, however is well management with supine and seated restbreaks this date. Pt requires SBA assist with 3 point rollator and Sosa to obtain all optimal positioning for exercise performance. Increased verbal and tactile cueing for exercise understanding. Continues to be appropriate for skilled PT services at this time.       Maryann Is progressing well towards her goals.   Pt prognosis is Good.     Pt will continue to benefit from skilled outpatient physical therapy to address the deficits listed in the problem list box on initial evaluation, provide pt/family education and to maximize pt's level of independence in the home and community environment.     Pt's spiritual, cultural and educational needs considered and pt agreeable to plan of care and goals.     Anticipated barriers to physical therapy: disease progression, scheduling    Goals:   Short Term Goals: 4 weeks   1. Pt will perform all bed mobility tasks with CGA or better so that they can improve trunk control and decrease caregiver burden.   2. Patient will complete stand pivot transfer with CGA and appropriate assistive device with good body mechanics to promote safe functional transfers.   3. Patient will transfer sit to/from standing with CGA and appropriate assistive device with good body mechanics to promote safe functional transfers.   4. Patient to tolerate performance of 5 times sit to stand to demonstrate improvement in lower extremity endurance.   5. Patient to tolerate MCTSIB testing/completion of testing to track balance and improvements in static balance.   6. Patient to perform Timed Up and Go at 47 seconds demonstrating improvement in  functional mobility.      Long Term Goals: 8 weeks   1. Pt will perform all bed mobility tasks with SPV or better so that they can improve trunk control and decrease caregiver burden.   2. Patient will be SBA with home exercise program (HEP) to promote continued rehabilitation following discharge.  3. Patient will complete stand pivot transfer with SBA and appropriate assistive device with good body mechanics to promote safe functional transfers.    4. Patient will transfer sit to/from standing with SBA and appropriate assistive device with good body mechanics to promote safe functional transfers.   6. Patient to perform Timed Up and Go at 41 seconds demonstrating improvement in functional mobility.     PLAN   Plan of care Certification: 9/9/2022 to 11/4/22.     Outpatient Physical Therapy 2 times weekly for 8 weeks to include the following interventions: Gait Training, Manual Therapy, Moist Heat/ Ice, Neuromuscular Re-ed, Patient Education, Therapeutic Activities, and Therapeutic Exercise.     Makeda Martinez, PT, DPT, NCS  09/16/2022

## 2022-09-23 ENCOUNTER — CLINICAL SUPPORT (OUTPATIENT)
Dept: REHABILITATION | Facility: HOSPITAL | Age: 72
End: 2022-09-23
Payer: MEDICARE

## 2022-09-23 DIAGNOSIS — R53.1 DECREASED STRENGTH, ENDURANCE, AND MOBILITY: ICD-10-CM

## 2022-09-23 DIAGNOSIS — Z74.09 DECREASED STRENGTH, ENDURANCE, AND MOBILITY: ICD-10-CM

## 2022-09-23 DIAGNOSIS — R68.89 DECREASED STRENGTH, ENDURANCE, AND MOBILITY: ICD-10-CM

## 2022-09-23 DIAGNOSIS — R26.9 ABNORMALITY OF GAIT: ICD-10-CM

## 2022-09-23 DIAGNOSIS — R26.89 BALANCE PROBLEM: Primary | ICD-10-CM

## 2022-09-23 PROCEDURE — 97110 THERAPEUTIC EXERCISES: CPT | Mod: PN

## 2022-09-23 NOTE — PROGRESS NOTES
[facility]   Physical Therapy Treatment Note     Name: Marie Lejeune  Clinic Number: [id number]    Therapy Diagnosis:   Encounter Diagnoses   Name Primary?    Balance problem Yes    Decreased strength, endurance, and mobility     Abnormality of gait        Physician: Veena Joshua MD    Visit Date: 9/23/2022    Physician Orders: PT Eval and Treat MS  Medical Diagnosis from Referral: MS (multiple sclerosis) [G35], Gait disturbance [R26.9]  Evaluation Date: 9/9/2022  Authorization Period Expiration: 8/22/23  Plan of Care Expiration: 11/4/22  Visit # / Visits authorized: 1/ 1, 2/25   PN: 10/9/22    PTA Visit #: 0/5     Time In: 9:30  Time Out: 10:15  Total Billable Time: 45 minutes    Precautions: Standard, Fall, and Multiple Sclerosis - do not fatigue     SUBJECTIVE     Pt reports: doing well, nothing new to report.    She was compliant with home exercise program.  Response to previous treatment: fine  Functional change: ongoing     Pain: 0/10  Location: NA      OBJECTIVE     Objective Measures updated at progress report unless specified.     Treatment     Maryann received the treatments listed below:      therapeutic exercises to develop strength, endurance, ROM, posture, and core stabilization for 45 minutes including:    Nustep 5 min - 5/10 fatigue   Nustep 5 min - 3./10 fatigue   - BUE/BLE - Level 1     //bars:    Sit to stand 10x - 5/10 fatigue   Sit to stand 10x - 4/10 fatigue    Lateral stepping x3 lengths - 5/10 fatigue    Lateral stepping x3 lengths - 5/10 fatigue    Hip ABD 10x bilateral - 3/10 fatigue    Hip ABD 10x bilateral - 3/10 fatigue       Patient Education and Home Exercises     Home Exercises Provided and Patient Education Provided     Education provided:   - role of PT, plan of care (POC), scheduling     Written Home Exercises Provided: Patient instructed to cont prior HEP.  Exercises were reviewed and Maryann was able to demonstrate them prior to the end of the  session.  Mrayann demonstrated fair  understanding of the education provided.      See EMR under Patient Instructions for exercises provided at future visits and at previous bout of therapy visits.    ASSESSMENT   Maryann demonstrating mild improvement and modulation of fatigue with therapuetic exercise targeting bilateral lower extremity strength, stability and endurance. Continues to be appropriate for skilled PT services at this time.       Maryann Is progressing well towards her goals.   Pt prognosis is Good.     Pt will continue to benefit from skilled outpatient physical therapy to address the deficits listed in the problem list box on initial evaluation, provide pt/family education and to maximize pt's level of independence in the home and community environment.     Pt's spiritual, cultural and educational needs considered and pt agreeable to plan of care and goals.     Anticipated barriers to physical therapy: disease progression, scheduling    Goals:   Short Term Goals: 4 weeks   1. Pt will perform all bed mobility tasks with CGA or better so that they can improve trunk control and decrease caregiver burden.   2. Patient will complete stand pivot transfer with CGA and appropriate assistive device with good body mechanics to promote safe functional transfers.   3. Patient will transfer sit to/from standing with CGA and appropriate assistive device with good body mechanics to promote safe functional transfers.   4. Patient to tolerate performance of 5 times sit to stand to demonstrate improvement in lower extremity endurance.   5. Patient to tolerate MCTSIB testing/completion of testing to track balance and improvements in static balance.   6. Patient to perform Timed Up and Go at 47 seconds demonstrating improvement in functional mobility.      Long Term Goals: 8 weeks   1. Pt will perform all bed mobility tasks with SPV or better so that they can improve trunk control and decrease caregiver burden.   2. Patient  will be SBA with home exercise program (HEP) to promote continued rehabilitation following discharge.  3. Patient will complete stand pivot transfer with SBA and appropriate assistive device with good body mechanics to promote safe functional transfers.    4. Patient will transfer sit to/from standing with SBA and appropriate assistive device with good body mechanics to promote safe functional transfers.   6. Patient to perform Timed Up and Go at 41 seconds demonstrating improvement in functional mobility.     PLAN   Plan of care Certification: 9/9/2022 to 11/4/22.     Outpatient Physical Therapy 2 times weekly for 8 weeks to include the following interventions: Gait Training, Manual Therapy, Moist Heat/ Ice, Neuromuscular Re-ed, Patient Education, Therapeutic Activities, and Therapeutic Exercise.     Makeda Martinez, PT, DPT, NCS  09/23/2022

## 2022-10-06 ENCOUNTER — CLINICAL SUPPORT (OUTPATIENT)
Dept: REHABILITATION | Facility: HOSPITAL | Age: 72
End: 2022-10-06
Payer: MEDICARE

## 2022-10-06 DIAGNOSIS — R26.9 ABNORMALITY OF GAIT: ICD-10-CM

## 2022-10-06 DIAGNOSIS — R53.1 DECREASED STRENGTH, ENDURANCE, AND MOBILITY: ICD-10-CM

## 2022-10-06 DIAGNOSIS — R26.89 BALANCE PROBLEM: Primary | ICD-10-CM

## 2022-10-06 DIAGNOSIS — R68.89 DECREASED STRENGTH, ENDURANCE, AND MOBILITY: ICD-10-CM

## 2022-10-06 DIAGNOSIS — Z74.09 DECREASED STRENGTH, ENDURANCE, AND MOBILITY: ICD-10-CM

## 2022-10-06 PROCEDURE — 97110 THERAPEUTIC EXERCISES: CPT | Mod: PN

## 2022-10-06 NOTE — PROGRESS NOTES
[facility]   Physical Therapy Treatment Note     Name: Marie Lejeune  [facility] Number: [id number]    Therapy Diagnosis:   Encounter Diagnoses   Name Primary?    Balance problem Yes    Decreased strength, endurance, and mobility     Abnormality of gait      Physician: Veena Joshua MD    Visit Date: 10/6/2022    Physician Orders: PT Eval and Treat MS  Medical Diagnosis from Referral: MS (multiple sclerosis) [G35], Gait disturbance [R26.9]  Evaluation Date: 9/9/2022  Authorization Period Expiration: 8/22/23  Plan of Care Expiration: 11/4/22  Visit # / Visits authorized: 1/ 1, 3/25   PN: 10/9/22    PTA Visit #: 0/5     Time In: 11:00  Time Out: 11:45  Total Billable Time: 45 minutes    Precautions: Standard, Fall, and Multiple Sclerosis - do not fatigue     SUBJECTIVE     Pt reports: nothing new to report; received flu shot this AM.    She was compliant with home exercise program.  Response to previous treatment: fine  Functional change: ongoing     Pain: 0/10  Location: NA      OBJECTIVE     Objective Measures updated at progress report unless specified.     Treatment     Maryann received the treatments listed below:      therapeutic exercises to develop strength, endurance, ROM, posture, and core stabilization for 45 minutes including:    Nustep 7 min - 6/10 fatigue   - BUE/BLE - Level 1     //bars:    Sit to stand 10x - 7/10 fatigue   Sit to stand 10x - 5/10 fatigue    Lateral stepping x3 lengths - 6/10 fatigue    Lateral stepping x3 lengths - 7/10 fatigue    Hip ABD 10x bilateral - 7/10 fatigue    Hip ABD 10x bilateral - 6/10 fatigue    Heel raises 10x2        Patient Education and Home Exercises     Home Exercises Provided and Patient Education Provided     Education provided:   - role of PT, plan of care (POC), scheduling     Written Home Exercises Provided: Patient instructed to cont prior HEP.  Exercises were reviewed and Maryann was able to demonstrate them prior to the end of the  session.  Maryann demonstrated fair  understanding of the education provided.      See EMR under Patient Instructions for exercises provided at future visits and at previous bout of therapy visits.    ASSESSMENT   Maryann demonstrating mild improvement in fatigue management with seated rest and demonstrating increased tolerance to exercise with increased challenge with time, length and number of exercises this date. Continues to be appropriate for skilled PT services at this time.       Maryann Is progressing well towards her goals.   Pt prognosis is Good.     Pt will continue to benefit from skilled outpatient physical therapy to address the deficits listed in the problem list box on initial evaluation, provide pt/family education and to maximize pt's level of independence in the home and community environment.     Pt's spiritual, cultural and educational needs considered and pt agreeable to plan of care and goals.     Anticipated barriers to physical therapy: disease progression, scheduling    Goals:   Short Term Goals: 4 weeks   1. Pt will perform all bed mobility tasks with CGA or better so that they can improve trunk control and decrease caregiver burden.   2. Patient will complete stand pivot transfer with CGA and appropriate assistive device with good body mechanics to promote safe functional transfers.   3. Patient will transfer sit to/from standing with CGA and appropriate assistive device with good body mechanics to promote safe functional transfers.   4. Patient to tolerate performance of 5 times sit to stand to demonstrate improvement in lower extremity endurance.   5. Patient to tolerate MCTSIB testing/completion of testing to track balance and improvements in static balance.   6. Patient to perform Timed Up and Go at 47 seconds demonstrating improvement in functional mobility.      Long Term Goals: 8 weeks   1. Pt will perform all bed mobility tasks with SPV or better so that they can improve trunk control  and decrease caregiver burden.   2. Patient will be SBA with home exercise program (HEP) to promote continued rehabilitation following discharge.  3. Patient will complete stand pivot transfer with SBA and appropriate assistive device with good body mechanics to promote safe functional transfers.    4. Patient will transfer sit to/from standing with SBA and appropriate assistive device with good body mechanics to promote safe functional transfers.   6. Patient to perform Timed Up and Go at 41 seconds demonstrating improvement in functional mobility.     PLAN   Plan of care Certification: 9/9/2022 to 11/4/22.     Outpatient Physical Therapy 2 times weekly for 8 weeks to include the following interventions: Gait Training, Manual Therapy, Moist Heat/ Ice, Neuromuscular Re-ed, Patient Education, Therapeutic Activities, and Therapeutic Exercise.     Makeda Martinez, PT, DPT, NCS  10/06/2022

## 2022-10-14 ENCOUNTER — CLINICAL SUPPORT (OUTPATIENT)
Dept: REHABILITATION | Facility: HOSPITAL | Age: 72
End: 2022-10-14
Payer: MEDICARE

## 2022-10-14 DIAGNOSIS — R26.89 BALANCE PROBLEM: Primary | ICD-10-CM

## 2022-10-14 DIAGNOSIS — Z74.09 DECREASED STRENGTH, ENDURANCE, AND MOBILITY: ICD-10-CM

## 2022-10-14 DIAGNOSIS — R68.89 DECREASED STRENGTH, ENDURANCE, AND MOBILITY: ICD-10-CM

## 2022-10-14 DIAGNOSIS — R53.1 DECREASED STRENGTH, ENDURANCE, AND MOBILITY: ICD-10-CM

## 2022-10-14 DIAGNOSIS — R26.9 ABNORMALITY OF GAIT: ICD-10-CM

## 2022-10-14 PROCEDURE — 97110 THERAPEUTIC EXERCISES: CPT | Mod: PN

## 2022-10-14 NOTE — PROGRESS NOTES
[facility]   Physical Therapy Treatment Note - Progress Note    Name: Marie Lejeune  Clinic Number: [id number]    Therapy Diagnosis:   Encounter Diagnoses   Name Primary?    Balance problem Yes    Decreased strength, endurance, and mobility     Abnormality of gait      Physician: Veena Joshua MD    Visit Date: 10/14/2022    Physician Orders: PT Eval and Treat MS  Medical Diagnosis from Referral: MS (multiple sclerosis) [G35], Gait disturbance [R26.9]  Evaluation Date: 9/9/2022  Authorization Period Expiration: 8/22/23  Plan of Care Expiration: 11/4/22  Visit # / Visits authorized: 1/ 1, 4/25   PN: 11/14/22    PTA Visit #: 0/5     Time In: 10:10  Time Out: 11:00  Total Billable Time: 50 minutes    Precautions: Standard, Fall, and Multiple Sclerosis - do not fatigue     SUBJECTIVE     Pt reports: doing well, has been performing all home exercise program     She was compliant with home exercise program.  Response to previous treatment: fine  Functional change: ongoing     Pain: 0/10  Location: NA      OBJECTIVE     Objective Measures updated at progress report unless specified.        Evaluation 10/14/22   Single Limb Stance R LE NT  (<10 sec = HIGH FALL RISK) NT   Single Limb Stance L LE NT  (<10 sec = HIGH FALL RISK) NT   5 times sit-stand NT  >12 sec= fall risk for general elderly  >16 sec= fall risk for Parkinson's disease  >10 sec= balance/vestibular dysfunction (<61 y/o)  >14.2 sec= balance/vestibular dysfunction (>61 y/o)  >12 sec= fall risk for CVA 28 seconds - no upper extremity    Hopper/ FGA/ Tinetti NT NT      Postural control:  MCTSIB:  1. Eyes Open/feet together/Firm: NT seconds --> 30 seconds   2. Eyes Closed/feet together/Firm: NT seconds --> 8 seconds   3. Eyes Open/feet together/Foam: NT seconds --> 30 seconds   4. Eyes Closed/feet together/Foam: NT seconds --> 4 seconds      Gait Assessment:   - AD used: rollator   - Assistance: SBA - Sosa   - Distance: 50 feet       GAIT DEVIATIONS:  Gait component performance: decreased speed, decreased step length and stride length, increased step width, increased weightshift from left to right without trunk rotation or hip rotation; bilateral lower extremities with external rotation, no toe off or heel strike; rollator height increased and arms extended at 90 degrees of flexion       Endurance Deficit: severe         Evaluation 10/14/22   Timed Up and Go 53 sec  < 20 sec safe for independent transfers,     < 30 sec assist required for transfers 42 seconds - rollator   6 meter walk test 0.11 m/s 0.26 m/s    6 min walk test NT NT       CMS Impairment/Limitation/Restriction for FOTO Multiple Sclerosis Survey     Therapist reviewed FOTO scores for Marie Lejeune on 9/9/2022.   FOTO documents entered into [employer] - see Media section.     Limitation Score: 60% -->  52%  Category: Mobility         Treatment     Maryann received the treatments listed below:      therapeutic exercises to develop strength, endurance, ROM, posture, and core stabilization for 50 minutes including:    Nustep 10 min - 4/10 fatigue   - BUE/BLE - Level 1     Objective as above     //bars:   Sit to stand 10x2 - 5/10 fatigue    Lateral stepping x4 lengths - 5/10 fatigue    Hip ABD 10x2 bilateral - 6/10 fatigue    Heel raises 10x2        Patient Education and Home Exercises     Home Exercises Provided and Patient Education Provided     Education provided:   - role of PT, plan of care (POC), scheduling     Written Home Exercises Provided: Patient instructed to cont prior HEP.  Exercises were reviewed and Maryann was able to demonstrate them prior to the end of the session.  Maryann demonstrated fair  understanding of the education provided.      See EMR under Patient Instructions for exercises provided at future visits and at previous bout of therapy visits.    ASSESSMENT   Maryann demonstrating statistically significant improvement on all objective measures including gait speed,  functional strength, functional mobility and through subjective report recorded through FOTO.  Fatigue has decreases within each exercise and patient is able to increase exercise time between restbreaks. Continues to be appropriate for skilled PT services at this time.       Maryann Is progressing well towards her goals.   Pt prognosis is Good.     Pt will continue to benefit from skilled outpatient physical therapy to address the deficits listed in the problem list box on initial evaluation, provide pt/family education and to maximize pt's level of independence in the home and community environment.     Pt's spiritual, cultural and educational needs considered and pt agreeable to plan of care and goals.     Anticipated barriers to physical therapy: disease progression, scheduling    Goals: as of 10/14/22   Short Term Goals: 4 weeks   1. Pt will perform all bed mobility tasks with CGA or better so that they can improve trunk control and decrease caregiver burden. - improving  2. Patient will complete stand pivot transfer with CGA and appropriate assistive device with good body mechanics to promote safe functional transfers. - MET 10/14/22  3. Patient will transfer sit to/from standing with CGA and appropriate assistive device with good body mechanics to promote safe functional transfers. - MET 10/14/22  4. Patient to tolerate performance of 5 times sit to stand to demonstrate improvement in lower extremity endurance. - MET 10/14/22  5. Patient to tolerate MCTSIB testing/completion of testing to track balance and improvements in static balance. - MET 10/14/22  6. Patient to perform Timed Up and Go at 47 seconds demonstrating improvement in functional mobility. - MET 10/14/22     Long Term Goals: 8 weeks   1. Pt will perform all bed mobility tasks with SPV or better so that they can improve trunk control and decrease caregiver burden. - improving  2. Patient will be SBA with home exercise program (HEP) to promote  continued rehabilitation following discharge.  3. Patient will complete stand pivot transfer with SBA and appropriate assistive device with good body mechanics to promote safe functional transfers. - improving  4. Patient will transfer sit to/from standing with SBA and appropriate assistive device with good body mechanics to promote safe functional transfers. - improving  6. Patient to perform Timed Up and Go at 41 seconds demonstrating improvement in functional mobility. - improving  7. NEW: Patient to perform 5xSTS at 22 seconds demonstrating improvement in functional strength.   8. NEW: Patient to perform gait speed at 0.4 m/s demonstrating improved safety with household ambulation.   9. NEW: Patient to perform MCTSIB at 15+ seconds for all 4 conditions to improve safety with static standing for in home tasks.     PLAN   Plan of care Certification: 9/9/2022 to 11/4/22.     Outpatient Physical Therapy 2 times weekly for 8 weeks to include the following interventions: Gait Training, Manual Therapy, Moist Heat/ Ice, Neuromuscular Re-ed, Patient Education, Therapeutic Activities, and Therapeutic Exercise.     Makeda Martinez, PT, DPT, NCS  10/14/2022

## 2022-10-20 ENCOUNTER — CLINICAL SUPPORT (OUTPATIENT)
Dept: REHABILITATION | Facility: HOSPITAL | Age: 72
End: 2022-10-20
Payer: MEDICARE

## 2022-10-20 DIAGNOSIS — R26.89 BALANCE PROBLEM: Primary | ICD-10-CM

## 2022-10-20 DIAGNOSIS — R68.89 DECREASED STRENGTH, ENDURANCE, AND MOBILITY: ICD-10-CM

## 2022-10-20 DIAGNOSIS — R26.9 ABNORMALITY OF GAIT: ICD-10-CM

## 2022-10-20 DIAGNOSIS — Z74.09 DECREASED STRENGTH, ENDURANCE, AND MOBILITY: ICD-10-CM

## 2022-10-20 DIAGNOSIS — R53.1 DECREASED STRENGTH, ENDURANCE, AND MOBILITY: ICD-10-CM

## 2022-10-20 PROCEDURE — 97110 THERAPEUTIC EXERCISES: CPT | Mod: PN

## 2022-10-20 NOTE — PROGRESS NOTES
"[facility]   Physical Therapy Treatment Note    Name: Marie Lejeune  Clinic Number: [id number]    Therapy Diagnosis:   Encounter Diagnoses   Name Primary?    Balance problem Yes    Decreased strength, endurance, and mobility     Abnormality of gait      Physician: Veena Joshua MD    Visit Date: 10/20/2022    Physician Orders: PT Eval and Treat MS  Medical Diagnosis from Referral: MS (multiple sclerosis) [G35], Gait disturbance [R26.9]  Evaluation Date: 9/9/2022  Authorization Period Expiration: 8/22/23  Plan of Care Expiration: 11/4/22  Visit # / Visits authorized: 1/ 1, 5/25   PN: 11/14/22    PTA Visit #: 0/5     Time In: 1100  Time Out: 1145  Total Billable Time: 45 minutes    Precautions: Standard, Fall, and Multiple Sclerosis - do not fatigue     SUBJECTIVE     Pt reports: doing well, has been performing all home exercise program, pulled R hamstring doing HEP but no discomfort coming in    She was compliant with home exercise program.  Response to previous treatment: fine  Functional change: ongoing     Pain: 0/10  Location: NA      OBJECTIVE     Objective Measures updated at progress report unless specified.      Treatment     Maryann received the treatments listed below:      therapeutic exercises to develop strength, endurance, ROM, posture, and core stabilization for 45 minutes including:    Nustep 11 min - 5/10 fatigue   - BUE/BLE - Level 1     //bars:               Sit to stand 10x - 5/10 fatigue   Sit to stand 10x - 6/10 fatigue               Lateral stepping x3 lengths - 410 fatigue               Lateral stepping x3 lengths - 6/10 fatigue               Hip ABD 10x bilateral - 5/10 fatigue               Hip ABD 10x bilateral - 6/10 fatigue               Heel raises 10x2 5/10   Step up 6" x10 - 7/10      Patient Education and Home Exercises     Home Exercises Provided and Patient Education Provided     Education provided:   - role of PT, plan of care (POC), scheduling   "   Written Home Exercises Provided: Patient instructed to cont prior HEP.  Exercises were reviewed and Maryann was able to demonstrate them prior to the end of the session.  Maryann demonstrated fair  understanding of the education provided.      See EMR under Patient Instructions for exercises provided at future visits and at previous bout of therapy visits.    ASSESSMENT   Maryann tolerated today's physical therapy session well with few sitting breaks. Pt's highest reported fatigue was during step ups, 7/10, which required pt to take an extended sitting break. Moderate assisted needed to prevent left knee genu recurvatum during step up tasks. Continue skilled physical therapy to improve cardiovascular endurance and lower extremity strength.      Maryann Is progressing well towards her goals.   Pt prognosis is Good.     Pt will continue to benefit from skilled outpatient physical therapy to address the deficits listed in the problem list box on initial evaluation, provide pt/family education and to maximize pt's level of independence in the home and community environment.     Pt's spiritual, cultural and educational needs considered and pt agreeable to plan of care and goals.     Anticipated barriers to physical therapy: disease progression, scheduling    Goals: as of 10/14/22   Short Term Goals: 4 weeks   1. Pt will perform all bed mobility tasks with CGA or better so that they can improve trunk control and decrease caregiver burden. - improving  2. Patient will complete stand pivot transfer with CGA and appropriate assistive device with good body mechanics to promote safe functional transfers. - MET 10/14/22  3. Patient will transfer sit to/from standing with CGA and appropriate assistive device with good body mechanics to promote safe functional transfers. - MET 10/14/22  4. Patient to tolerate performance of 5 times sit to stand to demonstrate improvement in lower extremity endurance. - MET 10/14/22  5. Patient to  tolerate MCTSIB testing/completion of testing to track balance and improvements in static balance. - MET 10/14/22  6. Patient to perform Timed Up and Go at 47 seconds demonstrating improvement in functional mobility. - MET 10/14/22     Long Term Goals: 8 weeks   1. Pt will perform all bed mobility tasks with SPV or better so that they can improve trunk control and decrease caregiver burden. - improving  2. Patient will be SBA with home exercise program (HEP) to promote continued rehabilitation following discharge.  3. Patient will complete stand pivot transfer with SBA and appropriate assistive device with good body mechanics to promote safe functional transfers. - improving  4. Patient will transfer sit to/from standing with SBA and appropriate assistive device with good body mechanics to promote safe functional transfers. - improving  6. Patient to perform Timed Up and Go at 41 seconds demonstrating improvement in functional mobility. - improving  7. NEW: Patient to perform 5xSTS at 22 seconds demonstrating improvement in functional strength.   8. NEW: Patient to perform gait speed at 0.4 m/s demonstrating improved safety with household ambulation.   9. NEW: Patient to perform MCTSIB at 15+ seconds for all 4 conditions to improve safety with static standing for in home tasks.     PLAN   Plan of care Certification: 9/9/2022 to 11/4/22.     Outpatient Physical Therapy 2 times weekly for 8 weeks to include the following interventions: Gait Training, Manual Therapy, Moist Heat/ Ice, Neuromuscular Re-ed, Patient Education, Therapeutic Activities, and Therapeutic Exercise.     Continue to progress and monitor fatigue.     AYLIN Shipley    I certify that I was present in the room directing the student in service delivery and guiding them using my skilled judgment. As the co-signing therapist I have reviewed the students documentation and am responsible for the treatment, assessment, and plan.     Makeda Martinez, PT,  RENAET, NCS  10/20/2022

## 2022-10-27 ENCOUNTER — CLINICAL SUPPORT (OUTPATIENT)
Dept: REHABILITATION | Facility: HOSPITAL | Age: 72
End: 2022-10-27
Payer: MEDICARE

## 2022-10-27 DIAGNOSIS — R53.1 DECREASED STRENGTH, ENDURANCE, AND MOBILITY: ICD-10-CM

## 2022-10-27 DIAGNOSIS — R26.89 BALANCE PROBLEM: Primary | ICD-10-CM

## 2022-10-27 DIAGNOSIS — Z74.09 DECREASED STRENGTH, ENDURANCE, AND MOBILITY: ICD-10-CM

## 2022-10-27 DIAGNOSIS — R26.9 ABNORMALITY OF GAIT: ICD-10-CM

## 2022-10-27 DIAGNOSIS — R68.89 DECREASED STRENGTH, ENDURANCE, AND MOBILITY: ICD-10-CM

## 2022-10-27 PROCEDURE — 97110 THERAPEUTIC EXERCISES: CPT | Mod: PN

## 2022-10-27 NOTE — PROGRESS NOTES
"[facility]   Physical Therapy Treatment Note    Name: Marie Lejeune  Clinic Number: [id number]    Therapy Diagnosis:   Encounter Diagnoses   Name Primary?    Balance problem Yes    Decreased strength, endurance, and mobility     Abnormality of gait      Physician: Veena Joshua MD    Visit Date: 10/27/2022    Physician Orders: PT Eval and Treat MS  Medical Diagnosis from Referral: MS (multiple sclerosis) [G35], Gait disturbance [R26.9]  Evaluation Date: 9/9/2022  Authorization Period Expiration: 8/22/23  Plan of Care Expiration: 11/4/22  Visit # / Visits authorized: 1/ 1, 6/25   PN: 11/14/22    PTA Visit #: 0/5     Time In: 11:00  Time Out: 11:43  Total Billable Time: 43 minutes    Precautions: Standard, Fall, and Multiple Sclerosis - do not fatigue     SUBJECTIVE     Pt reports: doing well, nothing new to report, no fatigue noted     She was compliant with home exercise program.  Response to previous treatment: fine  Functional change: ongoing     Pain: 0/10  Location: NA      OBJECTIVE     Objective Measures updated at progress report unless specified.      Treatment     Maryann received the treatments listed below:      therapeutic exercises to develop strength, endurance, ROM, posture, and core stabilization for 45 minutes including:    Nustep 12 min - 5/10 fatigue   - BUE/BLE - Level 2    At stairs:               Sit to stand 10x - 5/10 fatigue   Sit to stand 10x - 6/10 fatigue   REST   Heel raises 10x2 5/10  Hip ABD 10x bilateral - 6/10 fatigue               Hip ABD 10x bilateral - 7/10 fatigue    REST    Step tap 4" 10x2 - 6/10, 5/10              Step up 4" 10x2 - 6/10, 7/10   Lateral stepping x3 lengths (there and back) - 6.5/10 fatigue                               Patient Education and Home Exercises     Home Exercises Provided and Patient Education Provided     Education provided:   - role of PT, plan of care (POC), scheduling     Written Home Exercises Provided: Patient " instructed to cont prior HEP.  Exercises were reviewed and Maryann was able to demonstrate them prior to the end of the session.  Maryann demonstrated fair  understanding of the education provided.      See EMR under Patient Instructions for exercises provided at future visits and at previous bout of therapy visits.    ASSESSMENT   Maryann demonstrating appropriate challenge this date with decreased number of seated restbreaks required and appropriate symptom management throughout. Patient is very motivated to improve tolerance to exercise and improve functional mobility overall. Continue skilled physical therapy to improve cardiovascular endurance and lower extremity strength and balance/stability.       Maryann Is progressing well towards her goals.   Pt prognosis is Good.     Pt will continue to benefit from skilled outpatient physical therapy to address the deficits listed in the problem list box on initial evaluation, provide pt/family education and to maximize pt's level of independence in the home and community environment.     Pt's spiritual, cultural and educational needs considered and pt agreeable to plan of care and goals.     Anticipated barriers to physical therapy: disease progression, scheduling    Goals: as of 10/14/22   Short Term Goals: 4 weeks   1. Pt will perform all bed mobility tasks with CGA or better so that they can improve trunk control and decrease caregiver burden. - improving  2. Patient will complete stand pivot transfer with CGA and appropriate assistive device with good body mechanics to promote safe functional transfers. - MET 10/14/22  3. Patient will transfer sit to/from standing with CGA and appropriate assistive device with good body mechanics to promote safe functional transfers. - MET 10/14/22  4. Patient to tolerate performance of 5 times sit to stand to demonstrate improvement in lower extremity endurance. - MET 10/14/22  5. Patient to tolerate MCTSIB testing/completion of testing  to track balance and improvements in static balance. - MET 10/14/22  6. Patient to perform Timed Up and Go at 47 seconds demonstrating improvement in functional mobility. - MET 10/14/22     Long Term Goals: 8 weeks   1. Pt will perform all bed mobility tasks with SPV or better so that they can improve trunk control and decrease caregiver burden. - improving  2. Patient will be SBA with home exercise program (HEP) to promote continued rehabilitation following discharge.  3. Patient will complete stand pivot transfer with SBA and appropriate assistive device with good body mechanics to promote safe functional transfers. - improving  4. Patient will transfer sit to/from standing with SBA and appropriate assistive device with good body mechanics to promote safe functional transfers. - improving  6. Patient to perform Timed Up and Go at 41 seconds demonstrating improvement in functional mobility. - improving  7. NEW: Patient to perform 5xSTS at 22 seconds demonstrating improvement in functional strength.   8. NEW: Patient to perform gait speed at 0.4 m/s demonstrating improved safety with household ambulation.   9. NEW: Patient to perform MCTSIB at 15+ seconds for all 4 conditions to improve safety with static standing for in home tasks.     PLAN   Plan of care Certification: 9/9/2022 to 11/4/22.     Outpatient Physical Therapy 2 times weekly for 8 weeks to include the following interventions: Gait Training, Manual Therapy, Moist Heat/ Ice, Neuromuscular Re-ed, Patient Education, Therapeutic Activities, and Therapeutic Exercise.     Continue to progress balance and strengthening while monitoring fatigue.     Makeda Martinez, PT, DPT, NCS  10/27/2022

## 2022-10-28 ENCOUNTER — CLINICAL SUPPORT (OUTPATIENT)
Dept: REHABILITATION | Facility: HOSPITAL | Age: 72
End: 2022-10-28
Payer: MEDICARE

## 2022-10-28 DIAGNOSIS — R53.1 DECREASED STRENGTH, ENDURANCE, AND MOBILITY: ICD-10-CM

## 2022-10-28 DIAGNOSIS — Z74.09 DECREASED STRENGTH, ENDURANCE, AND MOBILITY: ICD-10-CM

## 2022-10-28 DIAGNOSIS — R26.89 BALANCE PROBLEM: Primary | ICD-10-CM

## 2022-10-28 DIAGNOSIS — R68.89 DECREASED STRENGTH, ENDURANCE, AND MOBILITY: ICD-10-CM

## 2022-10-28 DIAGNOSIS — R26.9 ABNORMALITY OF GAIT: ICD-10-CM

## 2022-10-28 PROCEDURE — 97110 THERAPEUTIC EXERCISES: CPT | Mod: PN

## 2022-10-28 NOTE — PROGRESS NOTES
"[facility]   Physical Therapy Treatment Note    Name: Marie Lejeune  Clinic Number: [id number]    Therapy Diagnosis:   Encounter Diagnoses   Name Primary?    Balance problem Yes    Decreased strength, endurance, and mobility     Abnormality of gait        Physician: Veena Joshua MD    Visit Date: 10/28/2022    Physician Orders: PT Eval and Treat MS  Medical Diagnosis from Referral: MS (multiple sclerosis) [G35], Gait disturbance [R26.9]  Evaluation Date: 9/9/2022  Authorization Period Expiration: 8/22/23  Plan of Care Expiration: 11/4/22  Visit # / Visits authorized: 1/ 1, 7/25   PN: 11/14/22    PTA Visit #: 0/5     Time In: 9:35 AM  Time Out: 10:15 AM  Total Billable Time: 40 minutes    Precautions: Standard, Fall, and Multiple Sclerosis - do not fatigue     SUBJECTIVE     Pt reports: doing well, nothing new to report, no fatigue noted     She was compliant with home exercise program.  Response to previous treatment: fine  Functional change: ongoing     Pain: 0/10  Location: NA      OBJECTIVE     Objective Measures updated at progress report unless specified.      Treatment     Maryann received the treatments listed below:      therapeutic exercises to develop strength, endurance, ROM, posture, and core stabilization for 40 minutes including:    Nustep 12 min - 5/10 fatigue   - BUE/BLE - Level 2    At stairs:               Sit to stand 10x - 5/10 fatigue   Sit to stand 10x - 6/10 fatigue   REST   Heel raises 10x2 5/10  Hip ABD 10x bilateral - 6/10 fatigue               Hip ABD 10x bilateral - 7/10 fatigue    REST    Step tap 4" 10x2 - 6/10, 5/10              Step up 4" 10x2 - 6/10, 7/10   Lateral stepping x3 lengths (there and back) - 6.5/10 fatigue                               Patient Education and Home Exercises     Home Exercises Provided and Patient Education Provided     Education provided:   - role of PT, plan of care (POC), scheduling     Written Home Exercises Provided: " Patient instructed to cont prior HEP.  Exercises were reviewed and Maryann was able to demonstrate them prior to the end of the session.  Maryann demonstrated fair  understanding of the education provided.      See EMR under Patient Instructions for exercises provided at future visits and at previous bout of therapy visits.    ASSESSMENT   Maryann demonstrating appropriate challenge this date with appropriate amount of seated rest taken to keep fatigue levels low throughout session. Pt with appropriate muscular challenge noted to all standing exercises this date. Continue skilled physical therapy to improve cardiovascular endurance and lower extremity strength and balance/stability.       Maryann Is progressing well towards her goals.   Pt prognosis is Good.     Pt will continue to benefit from skilled outpatient physical therapy to address the deficits listed in the problem list box on initial evaluation, provide pt/family education and to maximize pt's level of independence in the home and community environment.     Pt's spiritual, cultural and educational needs considered and pt agreeable to plan of care and goals.     Anticipated barriers to physical therapy: disease progression, scheduling    Goals: as of 10/14/22   Short Term Goals: 4 weeks   1. Pt will perform all bed mobility tasks with CGA or better so that they can improve trunk control and decrease caregiver burden. - improving  2. Patient will complete stand pivot transfer with CGA and appropriate assistive device with good body mechanics to promote safe functional transfers. - MET 10/14/22  3. Patient will transfer sit to/from standing with CGA and appropriate assistive device with good body mechanics to promote safe functional transfers. - MET 10/14/22  4. Patient to tolerate performance of 5 times sit to stand to demonstrate improvement in lower extremity endurance. - MET 10/14/22  5. Patient to tolerate MCTSIB testing/completion of testing to track balance  and improvements in static balance. - MET 10/14/22  6. Patient to perform Timed Up and Go at 47 seconds demonstrating improvement in functional mobility. - MET 10/14/22     Long Term Goals: 8 weeks   1. Pt will perform all bed mobility tasks with SPV or better so that they can improve trunk control and decrease caregiver burden. - improving  2. Patient will be SBA with home exercise program (HEP) to promote continued rehabilitation following discharge.  3. Patient will complete stand pivot transfer with SBA and appropriate assistive device with good body mechanics to promote safe functional transfers. - improving  4. Patient will transfer sit to/from standing with SBA and appropriate assistive device with good body mechanics to promote safe functional transfers. - improving  6. Patient to perform Timed Up and Go at 41 seconds demonstrating improvement in functional mobility. - improving  7. NEW: Patient to perform 5xSTS at 22 seconds demonstrating improvement in functional strength.   8. NEW: Patient to perform gait speed at 0.4 m/s demonstrating improved safety with household ambulation.   9. NEW: Patient to perform MCTSIB at 15+ seconds for all 4 conditions to improve safety with static standing for in home tasks.     PLAN   Plan of care Certification: 9/9/2022 to 11/4/22.     Outpatient Physical Therapy 2 times weekly for 8 weeks to include the following interventions: Gait Training, Manual Therapy, Moist Heat/ Ice, Neuromuscular Re-ed, Patient Education, Therapeutic Activities, and Therapeutic Exercise.     Continue to progress balance and strengthening while monitoring fatigue.     Elizabeth Peterson, PT, DPT  10/31/2022

## 2022-11-02 NOTE — PROGRESS NOTES
"[facility]   Physical Therapy Treatment Note - Updated plan of care (POC)    Name: Marie Lejeune  Clinic Number: [id number]    Therapy Diagnosis:   Encounter Diagnoses   Name Primary?    Balance problem Yes    Decreased strength, endurance, and mobility     Abnormality of gait      Physician: Veena Joshua MD    Visit Date: 11/3/2022    Physician Orders: PT Eval and Treat MS  Medical Diagnosis from Referral: MS (multiple sclerosis) [G35], Gait disturbance [R26.9]  Evaluation Date: 9/9/2022  Authorization Period Expiration: 8/22/23  Plan of Care Expiration: UPDATED 12/30/22  Visit # / Visits authorized: 1/ 1, 8/25   PN: 11/14/22    PTA Visit #: 0/5     Time In: 11:45  Time Out: 12:30  Total Billable Time: 45 minutes    Precautions: Standard, Fall, and Multiple Sclerosis - do not fatigue     SUBJECTIVE     Pt reports: doing well, nothing new to report.      She was compliant with home exercise program.  Response to previous treatment: fine  Functional change: ongoing     Pain: 0/10  Location: NA    Fatigue: 2/10     OBJECTIVE     Objective Measures updated at progress report unless specified.     Refer to 10/14/22 progress note for last objective measures performed. Next set of objective measures to be performed on 11/14/22.      Treatment     Maryann received the treatments listed below:      therapeutic exercises to develop strength, endurance, ROM, posture, and core stabilization for 45 minutes including:    Nustep 6 min - level 3 - 4/10 fatigue - BUE/BLE   Nustep 6 min - level 2.5 - 5/10 fatigue - BUE/BLE     At parallel bars:    Heel raises 10x2 - 5/10     Lateral stepping x4 lengths - 6/10 fatigue    Lateral stepping x6 lengths - 5/10 fatigue                 Sit to stand 10x - 6/10 fatigue   Sit to stand 10x - 6/10 fatigue     Hip ABD 10x bilateral - 6/10 fatigue               Hip ABD 10x bilateral - 6/10 fatigue      Step tap 4" 10x2 - 6/10, 6/10                Step up 4" 10x2 - " 7/10, 7/10                               Patient Education and Home Exercises     Home Exercises Provided and Patient Education Provided     Education provided:   - role of PT, plan of care (POC), scheduling     Written Home Exercises Provided: Patient instructed to cont prior HEP.  Exercises were reviewed and Maryann was able to demonstrate them prior to the end of the session.  Maryann demonstrated fair  understanding of the education provided.      See EMR under Patient Instructions for exercises provided at future visits and at previous bout of therapy visits.    ASSESSMENT   Maryann demonstrating appropriate tolerance and symptom management this session with increased repetitions, resistance and challenge throughout all exercises. Will continue to challenge patient as appropriate. Patient would greatly benefit from continued skilled physical therapy to improve cardiovascular endurance and lower extremity strength and balance/stability.       Maryann Is progressing well towards her goals.   Pt prognosis is Good.     Pt will continue to benefit from skilled outpatient physical therapy to address the deficits listed in the problem list box on initial evaluation, provide pt/family education and to maximize pt's level of independence in the home and community environment.     Pt's spiritual, cultural and educational needs considered and pt agreeable to plan of care and goals.     Anticipated barriers to physical therapy: disease progression, scheduling    Goals: as of 10/14/22   Short Term Goals: 4 weeks   1. Pt will perform all bed mobility tasks with CGA or better so that they can improve trunk control and decrease caregiver burden. - improving  2. Patient will complete stand pivot transfer with CGA and appropriate assistive device with good body mechanics to promote safe functional transfers. - MET 10/14/22  3. Patient will transfer sit to/from standing with CGA and appropriate assistive device with good body mechanics  to promote safe functional transfers. - MET 10/14/22  4. Patient to tolerate performance of 5 times sit to stand to demonstrate improvement in lower extremity endurance. - MET 10/14/22  5. Patient to tolerate MCTSIB testing/completion of testing to track balance and improvements in static balance. - MET 10/14/22  6. Patient to perform Timed Up and Go at 47 seconds demonstrating improvement in functional mobility. - MET 10/14/22     Long Term Goals: 8 weeks   1. Pt will perform all bed mobility tasks with SPV or better so that they can improve trunk control and decrease caregiver burden. - improving  2. Patient will be SBA with home exercise program (HEP) to promote continued rehabilitation following discharge.  3. Patient will complete stand pivot transfer with SBA and appropriate assistive device with good body mechanics to promote safe functional transfers. - improving  4. Patient will transfer sit to/from standing with SBA and appropriate assistive device with good body mechanics to promote safe functional transfers. - improving  6. Patient to perform Timed Up and Go at 41 seconds demonstrating improvement in functional mobility. - improving  7. NEW: Patient to perform 5xSTS at 22 seconds demonstrating improvement in functional strength.   8. NEW: Patient to perform gait speed at 0.4 m/s demonstrating improved safety with household ambulation.   9. NEW: Patient to perform MCTSIB at 15+ seconds for all 4 conditions to improve safety with static standing for in home tasks.     PLAN   Plan of care Certification: Updated 12/30/22     Current plan of care (POC): Outpatient Physical Therapy 2 times weekly for 8 weeks to include the following interventions: Gait Training, Manual Therapy, Moist Heat/ Ice, Neuromuscular Re-ed, Patient Education, Therapeutic Activities, and Therapeutic Exercise.     Continue to progress balance and strengthening while monitoring fatigue.     Makeda Martinez, PT, DPT, NCS  11/03/2022

## 2022-11-03 ENCOUNTER — CLINICAL SUPPORT (OUTPATIENT)
Dept: REHABILITATION | Facility: HOSPITAL | Age: 72
End: 2022-11-03
Payer: MEDICARE

## 2022-11-03 DIAGNOSIS — R26.9 ABNORMALITY OF GAIT: ICD-10-CM

## 2022-11-03 DIAGNOSIS — R53.1 DECREASED STRENGTH, ENDURANCE, AND MOBILITY: ICD-10-CM

## 2022-11-03 DIAGNOSIS — R68.89 DECREASED STRENGTH, ENDURANCE, AND MOBILITY: ICD-10-CM

## 2022-11-03 DIAGNOSIS — R26.89 BALANCE PROBLEM: Primary | ICD-10-CM

## 2022-11-03 DIAGNOSIS — Z74.09 DECREASED STRENGTH, ENDURANCE, AND MOBILITY: ICD-10-CM

## 2022-11-03 PROCEDURE — 97110 THERAPEUTIC EXERCISES: CPT | Mod: PN

## 2022-11-03 NOTE — PLAN OF CARE
[facility]   Physical Therapy Treatment Note - Updated plan of care (POC)    Name: Marie Lejeune  Clinic Number: [id number]    Therapy Diagnosis:   Encounter Diagnoses   Name Primary?    Balance problem Yes    Decreased strength, endurance, and mobility     Abnormality of gait      Physician: Veena Joshua MD    Visit Date: 11/3/2022    Physician Orders: PT Eval and Treat MS  Medical Diagnosis from Referral: MS (multiple sclerosis) [G35], Gait disturbance [R26.9]  Evaluation Date: 9/9/2022  Authorization Period Expiration: 8/22/23  Plan of Care Expiration: UPDATED 12/30/22  Visit # / Visits authorized: 1/ 1, 8/25   PN: 11/14/22    PTA Visit #: 0/5     Time In: 11:45  Time Out: 12:30  Total Billable Time: 45 minutes    Precautions: Standard, Fall, and Multiple Sclerosis - do not fatigue     SUBJECTIVE     Pt reports: doing well, nothing new to report.      She was compliant with home exercise program.  Response to previous treatment: fine  Functional change: ongoing     Pain: 0/10  Location: NA    Fatigue: 2/10     OBJECTIVE     Objective Measures updated at progress report unless specified.     Refer to 10/14/22 progress note for last objective measures performed. Next set of objective measures to be performed on 11/14/22.                               Patient Education and Home Exercises     Home Exercises Provided and Patient Education Provided     Education provided:   - role of PT, plan of care (POC), scheduling     Written Home Exercises Provided: Patient instructed to cont prior HEP.  Exercises were reviewed and Maryann was able to demonstrate them prior to the end of the session.  Maryann demonstrated fair  understanding of the education provided.      See EMR under Patient Instructions for exercises provided at future visits and at previous bout of therapy visits.    ASSESSMENT   Maryann demonstrating appropriate tolerance and symptom management this session with increased  repetitions, resistance and challenge throughout all exercises. Will continue to challenge patient as appropriate. Patient would greatly benefit from continued skilled physical therapy to improve cardiovascular endurance and lower extremity strength and balance/stability.       Maryann Is progressing well towards her goals.   Pt prognosis is Good.     Pt will continue to benefit from skilled outpatient physical therapy to address the deficits listed in the problem list box on initial evaluation, provide pt/family education and to maximize pt's level of independence in the home and community environment.     Pt's spiritual, cultural and educational needs considered and pt agreeable to plan of care and goals.     Anticipated barriers to physical therapy: disease progression, scheduling    Goals: as of 10/14/22   Short Term Goals: 4 weeks   1. Pt will perform all bed mobility tasks with CGA or better so that they can improve trunk control and decrease caregiver burden. - improving  2. Patient will complete stand pivot transfer with CGA and appropriate assistive device with good body mechanics to promote safe functional transfers. - MET 10/14/22  3. Patient will transfer sit to/from standing with CGA and appropriate assistive device with good body mechanics to promote safe functional transfers. - MET 10/14/22  4. Patient to tolerate performance of 5 times sit to stand to demonstrate improvement in lower extremity endurance. - MET 10/14/22  5. Patient to tolerate MCTSIB testing/completion of testing to track balance and improvements in static balance. - MET 10/14/22  6. Patient to perform Timed Up and Go at 47 seconds demonstrating improvement in functional mobility. - MET 10/14/22     Long Term Goals: 8 weeks   1. Pt will perform all bed mobility tasks with SPV or better so that they can improve trunk control and decrease caregiver burden. - improving  2. Patient will be SBA with home exercise program (HEP) to promote  continued rehabilitation following discharge.  3. Patient will complete stand pivot transfer with SBA and appropriate assistive device with good body mechanics to promote safe functional transfers. - improving  4. Patient will transfer sit to/from standing with SBA and appropriate assistive device with good body mechanics to promote safe functional transfers. - improving  6. Patient to perform Timed Up and Go at 41 seconds demonstrating improvement in functional mobility. - improving  7. NEW: Patient to perform 5xSTS at 22 seconds demonstrating improvement in functional strength.   8. NEW: Patient to perform gait speed at 0.4 m/s demonstrating improved safety with household ambulation.   9. NEW: Patient to perform MCTSIB at 15+ seconds for all 4 conditions to improve safety with static standing for in home tasks.     PLAN   Plan of care Certification: Updated 12/30/22     Current plan of care (POC): Outpatient Physical Therapy 2 times weekly for 8 weeks to include the following interventions: Gait Training, Manual Therapy, Moist Heat/ Ice, Neuromuscular Re-ed, Patient Education, Therapeutic Activities, and Therapeutic Exercise.     Continue to progress balance and strengthening while monitoring fatigue.     Makeda Martinez, PT, DPT, NCS  11/03/2022

## 2022-11-09 NOTE — PROGRESS NOTES
"[facility]   Physical Therapy Treatment Note    Name: Marie Lejeune  Clinic Number: [id number]    Therapy Diagnosis:   Encounter Diagnoses   Name Primary?    Balance problem Yes    Decreased strength, endurance, and mobility     Abnormality of gait      Physician: Veena Joshua MD    Visit Date: 11/10/2022    Physician Orders: PT Eval and Treat MS  Medical Diagnosis from Referral: MS (multiple sclerosis) [G35], Gait disturbance [R26.9]  Evaluation Date: 9/9/2022  Authorization Period Expiration: 8/22/23  Plan of Care Expiration: UPDATED 12/30/22  Visit # / Visits authorized: 1/ 1, 9/25   PN: 11/14/22    PTA Visit #: 0/5     Time In: 10:15  Time Out: 11:00  Total Billable Time: 45 minutes    Precautions: Standard, Fall, and Multiple Sclerosis - do not fatigue     SUBJECTIVE     Pt reports: nothing new to report       She was compliant with home exercise program.  Response to previous treatment: 1 day of soreness   Functional change: ongoing     Pain: 0/10  Location: NA    Fatigue: 0/10     OBJECTIVE     Objective Measures updated at progress report unless specified.      Treatment     Maryann received the treatments listed below:      therapeutic exercises to develop strength, endurance, ROM, posture, and core stabilization for 45 minutes including:    Nustep 12 min - level 2.5 - 6/10 fatigue - BUE/BLE     At parallel bars:    Heel raises 10x2 - 6/10 - 2# ankle weights     Hip ABD 10x bilateral - 6/10 fatigue  - 2# ankle weights               Hip ABD 10x bilateral - 6/10 fatigue  - 2# ankle weights      Lateral stepping x6 lengths - 6/10 fatigue  - 2# ankle weights    Lateral stepping x6 lengths - 7/10 fatigue  - 2# ankle weights      Step tap 4" 10x2 - 6/10, 6/10  - 2# ankle weights                 Step up 4" 10x2 - 7/10, 7/10  - 2# ankle weights                               Patient Education and Home Exercises     Home Exercises Provided and Patient Education Provided     Education " provided:   - role of PT, plan of care (POC), scheduling     Written Home Exercises Provided: Patient instructed to cont prior HEP.  Exercises were reviewed and Maryann was able to demonstrate them prior to the end of the session.  Maryann demonstrated fair  understanding of the education provided.      See EMR under Patient Instructions for exercises provided at future visits and at previous bout of therapy visits.    ASSESSMENT   Maryann demonstrating improved symptomology and management of symptomology this session as continued from last session. Introduced ankle weights to challenge throughout all exercises this date with decreased number of exercises performed and increased number of seated restbreaks. Will be decreasing to 1x/wk as patient is progressing well and is very compliant with home exercise program. Patient would greatly benefit from continued skilled physical therapy to improve cardiovascular endurance and lower extremity strength and balance/stability.       Maryann Is progressing well towards her goals.   Pt prognosis is Good.     Pt will continue to benefit from skilled outpatient physical therapy to address the deficits listed in the problem list box on initial evaluation, provide pt/family education and to maximize pt's level of independence in the home and community environment.     Pt's spiritual, cultural and educational needs considered and pt agreeable to plan of care and goals.     Anticipated barriers to physical therapy: disease progression, scheduling    Goals: as of 10/14/22   Short Term Goals: 4 weeks   1. Pt will perform all bed mobility tasks with CGA or better so that they can improve trunk control and decrease caregiver burden. - improving  2. Patient will complete stand pivot transfer with CGA and appropriate assistive device with good body mechanics to promote safe functional transfers. - MET 10/14/22  3. Patient will transfer sit to/from standing with CGA and appropriate assistive  device with good body mechanics to promote safe functional transfers. - MET 10/14/22  4. Patient to tolerate performance of 5 times sit to stand to demonstrate improvement in lower extremity endurance. - MET 10/14/22  5. Patient to tolerate MCTSIB testing/completion of testing to track balance and improvements in static balance. - MET 10/14/22  6. Patient to perform Timed Up and Go at 47 seconds demonstrating improvement in functional mobility. - MET 10/14/22     Long Term Goals: 8 weeks   1. Pt will perform all bed mobility tasks with SPV or better so that they can improve trunk control and decrease caregiver burden. - improving  2. Patient will be SBA with home exercise program (HEP) to promote continued rehabilitation following discharge.  3. Patient will complete stand pivot transfer with SBA and appropriate assistive device with good body mechanics to promote safe functional transfers. - improving  4. Patient will transfer sit to/from standing with SBA and appropriate assistive device with good body mechanics to promote safe functional transfers. - improving  6. Patient to perform Timed Up and Go at 41 seconds demonstrating improvement in functional mobility. - improving  7. NEW: Patient to perform 5xSTS at 22 seconds demonstrating improvement in functional strength.   8. NEW: Patient to perform gait speed at 0.4 m/s demonstrating improved safety with household ambulation.   9. NEW: Patient to perform MCTSIB at 15+ seconds for all 4 conditions to improve safety with static standing for in home tasks.     PLAN   Current Plan of care Certification: Updated 12/30/22     Continue at 1x/wk. Continue to progress balance and strengthening while monitoring fatigue.     Makeda Martinez, PT, DPT, NCS  11/10/2022

## 2022-11-10 ENCOUNTER — CLINICAL SUPPORT (OUTPATIENT)
Dept: REHABILITATION | Facility: HOSPITAL | Age: 72
End: 2022-11-10
Payer: MEDICARE

## 2022-11-10 DIAGNOSIS — R26.89 BALANCE PROBLEM: Primary | ICD-10-CM

## 2022-11-10 DIAGNOSIS — Z74.09 DECREASED STRENGTH, ENDURANCE, AND MOBILITY: ICD-10-CM

## 2022-11-10 DIAGNOSIS — R68.89 DECREASED STRENGTH, ENDURANCE, AND MOBILITY: ICD-10-CM

## 2022-11-10 DIAGNOSIS — R26.9 ABNORMALITY OF GAIT: ICD-10-CM

## 2022-11-10 DIAGNOSIS — R53.1 DECREASED STRENGTH, ENDURANCE, AND MOBILITY: ICD-10-CM

## 2022-11-10 PROCEDURE — 97110 THERAPEUTIC EXERCISES: CPT | Mod: PN

## 2022-11-16 NOTE — PROGRESS NOTES
[facility]   Physical Therapy Treatment Note    Name: Marie Lejeune  [facility] Number: [id number]    Therapy Diagnosis:   Encounter Diagnoses   Name Primary?    Balance problem Yes    Decreased strength, endurance, and mobility     Abnormality of gait      Physician: Veena Joshua MD    Visit Date: 11/17/2022    Physician Orders: PT Eval and Treat MS  Medical Diagnosis from Referral: MS (multiple sclerosis) [G35], Gait disturbance [R26.9]  Evaluation Date: 9/9/2022  Authorization Period Expiration: 8/22/23  Plan of Care Expiration: UPDATED 12/30/22  Visit # / Visits authorized: 1/ 1, 10/25   PN: 12/17/22    PTA Visit #: 0/5     Time In: 10:15  Time Out: 11:00  Total Billable Time: 45 minutes    Precautions: Standard, Fall, and Multiple Sclerosis - do not fatigue     SUBJECTIVE     Pt reports: some emotional stressors        She was compliant with home exercise program.  Response to previous treatment: 1 day of soreness   Functional change: ongoing     Pain: 0/10  Location: NA    Fatigue: 0/10     OBJECTIVE     Objective Measures updated at progress report unless specified.       Evaluation 10/14/22 11/17/22   5 times sit-stand NT  >12 sec= fall risk for general elderly  >16 sec= fall risk for Parkinson's disease  >10 sec= balance/vestibular dysfunction (<61 y/o)  >14.2 sec= balance/vestibular dysfunction (>61 y/o)  >12 sec= fall risk for CVA 28 seconds - no upper extremity  24 seconds - no upper extremity        Hopper/ FGA/ Tinetti NT NT NT   Timed Up and Go 53 sec  < 20 sec safe for independent transfers,      < 30 sec assist required for transfers 42 seconds - rollator 33 seconds - rollator    6 meter walk test 0.11 m/s 0.26 m/s  0.31 m/s    7/10 fatigue    6 min walk test NT NT 2 min - 100 ft  4 min - 195 ft / 60 m    6/10 fatigue       Postural control:  MCTSIB:  1. Eyes Open/feet together/Firm: NT seconds --> 30 seconds   2. Eyes Closed/feet together/Firm: NT seconds --> 5 seconds  "  3. Eyes Open/feet together/Foam: NT seconds --> 30 seconds   4. Eyes Closed/feet together/Foam: NT seconds --> 5 seconds      Gait Assessment:   - AD used: rollator   - Assistance: SBA - Sosa   - Distance: 50 feet      GAIT DEVIATIONS:  Gait component performance: decreased speed, decreased step length and stride length, increased step width, increased weightshift from left to right without trunk rotation or hip rotation; bilateral lower extremities with external rotation, no toe off or heel strike; rollator height increased and arms extended at 90 degrees of flexion       Endurance Deficit: severe --> moderate      CMS Impairment/Limitation/Restriction for FOTO Multiple Sclerosis Survey     Therapist reviewed FOTO scores for Marie Lejeune on 9/9/2022.   FOTO documents entered into [employer] - see Media section.     Limitation Score: 60% -->  52% --> 52%   Category: Mobility            Treatment     Maryann received the treatments listed below:      therapeutic exercises to develop strength, endurance, ROM, posture, and core stabilization for 45 minutes including:    Nustep 8 min - level 3.0 - 6/10 fatigue - BUE/BLE     Objective as above       NOT PERFORMED DUE TO TIME:   At parallel bars:    Heel raises 10x2 - 6/10 - 2# ankle weights     Hip ABD 10x bilateral - 6/10 fatigue  - 2# ankle weights               Hip ABD 10x bilateral - 6/10 fatigue  - 2# ankle weights      Lateral stepping x6 lengths - 6/10 fatigue  - 2# ankle weights    Lateral stepping x6 lengths - 7/10 fatigue  - 2# ankle weights      Step tap 4" 10x2 - 6/10, 6/10  - 2# ankle weights                 Step up 4" 10x2 - 7/10, 7/10  - 2# ankle weights                               Patient Education and Home Exercises     Home Exercises Provided and Patient Education Provided     Education provided:   - role of PT, plan of care (POC), scheduling     Written Home Exercises Provided: Patient instructed to cont prior HEP.  Exercises were reviewed and Maryann was " able to demonstrate them prior to the end of the session.  Maryann demonstrated fair  understanding of the education provided.      See EMR under Patient Instructions for exercises provided at future visits and at previous bout of therapy visits.    ASSESSMENT   Maryann demonstrating significant improvement in all objective measures this date targeting functional strength, functional endurance, functional mobility and is able to tolerate 4 minutes of ambulation. Patient continues to be limited by dynamic and static balance as well as functional mobility/endurance and activity tolerance. Patient would greatly benefit from continued skilled physical therapy to improve cardiovascular endurance and lower extremity strength and balance/stability.       Maryann Is progressing well towards her goals.   Pt prognosis is Good.     Pt will continue to benefit from skilled outpatient physical therapy to address the deficits listed in the problem list box on initial evaluation, provide pt/family education and to maximize pt's level of independence in the home and community environment.     Pt's spiritual, cultural and educational needs considered and pt agreeable to plan of care and goals.     Anticipated barriers to physical therapy: disease progression, scheduling    Goals: as of 11/17/22  Short Term Goals: 4 weeks   1. Pt will perform all bed mobility tasks with CGA or better so that they can improve trunk control and decrease caregiver burden. - improving  2. Patient will complete stand pivot transfer with CGA and appropriate assistive device with good body mechanics to promote safe functional transfers. - MET 10/14/22  3. Patient will transfer sit to/from standing with CGA and appropriate assistive device with good body mechanics to promote safe functional transfers. - MET 10/14/22  4. Patient to tolerate performance of 5 times sit to stand to demonstrate improvement in lower extremity endurance. - MET 10/14/22  5. Patient to  tolerate MCTSIB testing/completion of testing to track balance and improvements in static balance. - MET 10/14/22  6. Patient to perform Timed Up and Go at 47 seconds demonstrating improvement in functional mobility. - MET 10/14/22     Long Term Goals: 8 weeks   1. Pt will perform all bed mobility tasks with SPV or better so that they can improve trunk control and decrease caregiver burden. - improving  2. Patient will be SBA with home exercise program (HEP) to promote continued rehabilitation following discharge.  3. Patient will complete stand pivot transfer with SBA and appropriate assistive device with good body mechanics to promote safe functional transfers. - improving  4. Patient will transfer sit to/from standing with SBA and appropriate assistive device with good body mechanics to promote safe functional transfers. - improving  6. Patient to perform Timed Up and Go at 41 seconds demonstrating improvement in functional mobility. - MET 11/17/22  7. NEW: Patient to perform 5xSTS at 22 seconds demonstrating improvement in functional strength. - improving   8. NEW: Patient to perform gait speed at 0.4 m/s demonstrating improved safety with household ambulation.    - improving   9. NEW: Patient to perform MCTSIB at 15+ seconds for all 4 conditions to improve safety with static standing for in home tasks.  - improving     PLAN   Current Plan of care Certification: Updated 12/30/22     Continue at 1x/wk. Continue to progress balance and strengthening while monitoring fatigue.     Include balance tasks in plan of care (POC) moving forward.     Makeda Martinez, PT, DPT, NCS  11/17/2022

## 2022-11-17 ENCOUNTER — CLINICAL SUPPORT (OUTPATIENT)
Dept: REHABILITATION | Facility: HOSPITAL | Age: 72
End: 2022-11-17
Payer: MEDICARE

## 2022-11-17 DIAGNOSIS — R26.9 ABNORMALITY OF GAIT: ICD-10-CM

## 2022-11-17 DIAGNOSIS — R53.1 DECREASED STRENGTH, ENDURANCE, AND MOBILITY: ICD-10-CM

## 2022-11-17 DIAGNOSIS — R68.89 DECREASED STRENGTH, ENDURANCE, AND MOBILITY: ICD-10-CM

## 2022-11-17 DIAGNOSIS — R26.89 BALANCE PROBLEM: Primary | ICD-10-CM

## 2022-11-17 DIAGNOSIS — Z74.09 DECREASED STRENGTH, ENDURANCE, AND MOBILITY: ICD-10-CM

## 2022-11-17 PROCEDURE — 97110 THERAPEUTIC EXERCISES: CPT | Mod: PN

## 2022-11-30 ENCOUNTER — LAB VISIT (OUTPATIENT)
Dept: LAB | Facility: HOSPITAL | Age: 72
End: 2022-11-30
Attending: INTERNAL MEDICINE
Payer: MEDICARE

## 2022-11-30 DIAGNOSIS — G35 MULTIPLE SCLEROSIS: ICD-10-CM

## 2022-11-30 LAB
ANION GAP SERPL CALC-SCNC: 5 MMOL/L (ref 8–16)
BUN SERPL-MCNC: 30 MG/DL (ref 8–23)
CALCIUM SERPL-MCNC: 10.1 MG/DL (ref 8.7–10.5)
CHLORIDE SERPL-SCNC: 106 MMOL/L (ref 95–110)
CO2 SERPL-SCNC: 27 MMOL/L (ref 23–29)
CREAT SERPL-MCNC: 0.9 MG/DL (ref 0.5–1.4)
EST. GFR  (NO RACE VARIABLE): >60 ML/MIN/1.73 M^2
GLUCOSE SERPL-MCNC: 96 MG/DL (ref 70–110)
POTASSIUM SERPL-SCNC: 4 MMOL/L (ref 3.5–5.1)
SODIUM SERPL-SCNC: 138 MMOL/L (ref 136–145)

## 2022-11-30 PROCEDURE — 80048 BASIC METABOLIC PNL TOTAL CA: CPT | Performed by: CLINICAL NURSE SPECIALIST

## 2022-11-30 PROCEDURE — 36415 COLL VENOUS BLD VENIPUNCTURE: CPT | Performed by: CLINICAL NURSE SPECIALIST

## 2022-12-01 ENCOUNTER — PATIENT MESSAGE (OUTPATIENT)
Dept: PSYCHIATRY | Facility: CLINIC | Age: 72
End: 2022-12-01
Payer: MEDICARE

## 2022-12-01 ENCOUNTER — CLINICAL SUPPORT (OUTPATIENT)
Dept: REHABILITATION | Facility: HOSPITAL | Age: 72
End: 2022-12-01
Payer: MEDICARE

## 2022-12-01 DIAGNOSIS — R26.9 ABNORMALITY OF GAIT: ICD-10-CM

## 2022-12-01 DIAGNOSIS — R26.89 BALANCE PROBLEM: Primary | ICD-10-CM

## 2022-12-01 DIAGNOSIS — R68.89 DECREASED STRENGTH, ENDURANCE, AND MOBILITY: ICD-10-CM

## 2022-12-01 DIAGNOSIS — R53.1 DECREASED STRENGTH, ENDURANCE, AND MOBILITY: ICD-10-CM

## 2022-12-01 DIAGNOSIS — Z74.09 DECREASED STRENGTH, ENDURANCE, AND MOBILITY: ICD-10-CM

## 2022-12-01 PROCEDURE — 97110 THERAPEUTIC EXERCISES: CPT | Mod: PN

## 2022-12-01 NOTE — PROGRESS NOTES
"[facility]   Physical Therapy Treatment Note    Name: Marie Lejeune  Clinic Number: [id number]    Therapy Diagnosis:   Encounter Diagnoses   Name Primary?    Balance problem Yes    Decreased strength, endurance, and mobility     Abnormality of gait        Physician: Veena Joshua MD    Visit Date: 12/1/2022    Physician Orders: PT Eval and Treat MS  Medical Diagnosis from Referral: MS (multiple sclerosis) [G35], Gait disturbance [R26.9]  Evaluation Date: 9/9/2022  Authorization Period Expiration: 8/22/23  Plan of Care Expiration: UPDATED 12/30/22  Visit # / Visits authorized: 1/ 1, 11/25   PN: 12/17/22    PTA Visit #: 0/5     Time In: 10:15  Time Out: 11:00  Total Billable Time: 45 minutes    Precautions: Standard, Fall, and Multiple Sclerosis - do not fatigue     SUBJECTIVE     Pt reports: doing ok overall, no new reports    She was compliant with home exercise program.  Response to previous treatment: 1 day of soreness   Functional change: ongoing     Pain: 0/10  Location: NA    Fatigue: 0/10     OBJECTIVE     Objective Measures updated at progress report unless specified.     Treatment     Maryann received the treatments listed below:      therapeutic exercises to develop strength, endurance, ROM, posture, and core stabilization for 45 minutes including:    Nustep 8 min - level 3.0 - 6/10 fatigue - BUE/BLE     At parallel bars:    Heel raises 10x2 - 6/10 - 2# ankle weights     Hip ABD 10x bilateral - 6/10 fatigue  - 2# ankle weights    Lateral stepping x6 lengths - 6/10 fatigue  - 2# ankle weights      Step tap 4" 10x2 - 6/10, 6/10  - 2# ankle weights                 Step up 4" 10x2 - 7/10, 7/10  - 2# ankle weights                               Patient Education and Home Exercises     Home Exercises Provided and Patient Education Provided     Education provided:   - role of PT, plan of care (POC), scheduling     Written Home Exercises Provided: Patient instructed to cont prior " HEP.  Exercises were reviewed and Maryann was able to demonstrate them prior to the end of the session.  Maryann demonstrated fair  understanding of the education provided.      See EMR under Patient Instructions for exercises provided at future visits and at previous bout of therapy visits.    ASSESSMENT   Maryann tolerated session well overall with no adverse response noted. Multiple seated rest breaks taken throughout session to avoid over fatigue. Pt requiring mod vc during step taps/ step ups to avoid circumduction with L LE, improve L hip/knee flexion. Pt remains appropriate for therapy at this time.     Maryann Is progressing well towards her goals.   Pt prognosis is Good.     Pt will continue to benefit from skilled outpatient physical therapy to address the deficits listed in the problem list box on initial evaluation, provide pt/family education and to maximize pt's level of independence in the home and community environment.     Pt's spiritual, cultural and educational needs considered and pt agreeable to plan of care and goals.     Anticipated barriers to physical therapy: disease progression, scheduling    Goals: as of 11/17/22  Short Term Goals: 4 weeks   1. Pt will perform all bed mobility tasks with CGA or better so that they can improve trunk control and decrease caregiver burden. - improving  2. Patient will complete stand pivot transfer with CGA and appropriate assistive device with good body mechanics to promote safe functional transfers. - MET 10/14/22  3. Patient will transfer sit to/from standing with CGA and appropriate assistive device with good body mechanics to promote safe functional transfers. - MET 10/14/22  4. Patient to tolerate performance of 5 times sit to stand to demonstrate improvement in lower extremity endurance. - MET 10/14/22  5. Patient to tolerate MCTSIB testing/completion of testing to track balance and improvements in static balance. - MET 10/14/22  6. Patient to perform Timed Up  and Go at 47 seconds demonstrating improvement in functional mobility. - MET 10/14/22     Long Term Goals: 8 weeks   1. Pt will perform all bed mobility tasks with SPV or better so that they can improve trunk control and decrease caregiver burden. - improving  2. Patient will be SBA with home exercise program (HEP) to promote continued rehabilitation following discharge.  3. Patient will complete stand pivot transfer with SBA and appropriate assistive device with good body mechanics to promote safe functional transfers. - improving  4. Patient will transfer sit to/from standing with SBA and appropriate assistive device with good body mechanics to promote safe functional transfers. - improving  6. Patient to perform Timed Up and Go at 41 seconds demonstrating improvement in functional mobility. - MET 11/17/22  7. NEW: Patient to perform 5xSTS at 22 seconds demonstrating improvement in functional strength. - improving   8. NEW: Patient to perform gait speed at 0.4 m/s demonstrating improved safety with household ambulation.    - improving   9. NEW: Patient to perform MCTSIB at 15+ seconds for all 4 conditions to improve safety with static standing for in home tasks.  - improving     PLAN   Current Plan of care Certification: Updated 12/30/22     Continue at 1x/wk. Continue to progress balance and strengthening while monitoring fatigue.     Elizabeth Peterson, PT, DPT  12/02/2022

## 2022-12-04 ENCOUNTER — PATIENT MESSAGE (OUTPATIENT)
Dept: NEUROLOGY | Facility: CLINIC | Age: 72
End: 2022-12-04
Payer: MEDICARE

## 2022-12-06 NOTE — TELEPHONE ENCOUNTER
I think we stop it --unfortunately.  Unlikely that her kidneys undergo an enduring recovery at her age. Thoughts?

## 2022-12-08 ENCOUNTER — CLINICAL SUPPORT (OUTPATIENT)
Dept: REHABILITATION | Facility: HOSPITAL | Age: 72
End: 2022-12-08
Payer: MEDICARE

## 2022-12-08 DIAGNOSIS — R26.89 BALANCE PROBLEM: Primary | ICD-10-CM

## 2022-12-08 DIAGNOSIS — R26.9 ABNORMALITY OF GAIT: ICD-10-CM

## 2022-12-08 DIAGNOSIS — R53.1 DECREASED STRENGTH, ENDURANCE, AND MOBILITY: ICD-10-CM

## 2022-12-08 DIAGNOSIS — Z74.09 DECREASED STRENGTH, ENDURANCE, AND MOBILITY: ICD-10-CM

## 2022-12-08 DIAGNOSIS — R68.89 DECREASED STRENGTH, ENDURANCE, AND MOBILITY: ICD-10-CM

## 2022-12-08 PROCEDURE — 97110 THERAPEUTIC EXERCISES: CPT | Mod: PN

## 2022-12-08 NOTE — PROGRESS NOTES
"[facility]   Physical Therapy Treatment Note    Name: Marie Lejeune  Clinic Number: [id number]    Therapy Diagnosis:   Encounter Diagnoses   Name Primary?    Balance problem Yes    Decreased strength, endurance, and mobility     Abnormality of gait        Physician: Veena Joshua MD    Visit Date: 12/8/2022    Physician Orders: PT Eval and Treat MS  Medical Diagnosis from Referral: MS (multiple sclerosis) [G35], Gait disturbance [R26.9]  Evaluation Date: 9/9/2022  Authorization Period Expiration: 8/22/23  Plan of Care Expiration: UPDATED 12/30/22  Visit # / Visits authorized: 1/ 1, 12/25   PN: 12/17/22    PTA Visit #: 0/5     Time In: 10:15  Time Out: 11:00  Total Billable Time: 45 minutes    Precautions: Standard, Fall, and Multiple Sclerosis - do not fatigue     SUBJECTIVE     Pt reports: doing ok overall, no new reports    She was compliant with home exercise program.  Response to previous treatment: 1 day of soreness   Functional change: ongoing     Pain: 0/10  Location: NA    Fatigue: 0/10     OBJECTIVE     Objective Measures updated at progress report unless specified.     Treatment     Maryann received the treatments listed below:      therapeutic exercises to develop strength, endurance, ROM, posture, and core stabilization for 45 minutes including:    Nustep 8 min - level 3.0 - 6/10 fatigue - BUE/BLE     At parallel bars:    Heel raises 10x2 - 6/10 - 2# ankle weights     Hip ABD 10x2 bilateral - 6/10 fatigue  - 2# ankle weights    Lateral stepping x6 lengths - 6/10 fatigue  - 2# ankle weights      Step tap 4" 10x2 - 6/10, 6/10  - 2# ankle weights                 Step up 4" 10x2 - 7/10, 7/10  - 2# ankle weights                               Patient Education and Home Exercises     Home Exercises Provided and Patient Education Provided     Education provided:   - role of PT, plan of care (POC), scheduling     Written Home Exercises Provided: Patient instructed to cont prior " HEP.  Exercises were reviewed and Maryann was able to demonstrate them prior to the end of the session.  Maryann demonstrated fair  understanding of the education provided.      See EMR under Patient Instructions for exercises provided at future visits and at previous bout of therapy visits.    ASSESSMENT   Maryann tolerated session well overall with no adverse response noted. Multiple seated rest breaks taken throughout session to avoid over fatigue. Pt requiring mod vc during step taps/ step ups to avoid circumduction with L LE, improve L hip/knee flexion. Pt remains appropriate for therapy at this time.     Maryann Is progressing well towards her goals.   Pt prognosis is Good.     Pt will continue to benefit from skilled outpatient physical therapy to address the deficits listed in the problem list box on initial evaluation, provide pt/family education and to maximize pt's level of independence in the home and community environment.     Pt's spiritual, cultural and educational needs considered and pt agreeable to plan of care and goals.     Anticipated barriers to physical therapy: disease progression, scheduling    Goals: as of 11/17/22  Short Term Goals: 4 weeks   1. Pt will perform all bed mobility tasks with CGA or better so that they can improve trunk control and decrease caregiver burden. - improving  2. Patient will complete stand pivot transfer with CGA and appropriate assistive device with good body mechanics to promote safe functional transfers. - MET 10/14/22  3. Patient will transfer sit to/from standing with CGA and appropriate assistive device with good body mechanics to promote safe functional transfers. - MET 10/14/22  4. Patient to tolerate performance of 5 times sit to stand to demonstrate improvement in lower extremity endurance. - MET 10/14/22  5. Patient to tolerate MCTSIB testing/completion of testing to track balance and improvements in static balance. - MET 10/14/22  6. Patient to perform Timed Up  and Go at 47 seconds demonstrating improvement in functional mobility. - MET 10/14/22     Long Term Goals: 8 weeks   1. Pt will perform all bed mobility tasks with SPV or better so that they can improve trunk control and decrease caregiver burden. - improving  2. Patient will be SBA with home exercise program (HEP) to promote continued rehabilitation following discharge.  3. Patient will complete stand pivot transfer with SBA and appropriate assistive device with good body mechanics to promote safe functional transfers. - improving  4. Patient will transfer sit to/from standing with SBA and appropriate assistive device with good body mechanics to promote safe functional transfers. - improving  6. Patient to perform Timed Up and Go at 41 seconds demonstrating improvement in functional mobility. - MET 11/17/22  7. NEW: Patient to perform 5xSTS at 22 seconds demonstrating improvement in functional strength. - improving   8. NEW: Patient to perform gait speed at 0.4 m/s demonstrating improved safety with household ambulation.    - improving   9. NEW: Patient to perform MCTSIB at 15+ seconds for all 4 conditions to improve safety with static standing for in home tasks.  - improving     PLAN   Current Plan of care Certification: Updated 12/30/22     Continue at 1x/wk. Continue to progress balance and strengthening while monitoring fatigue.     Elizabeth Peterson, PT, DPT  12/15/2022

## 2022-12-15 ENCOUNTER — CLINICAL SUPPORT (OUTPATIENT)
Dept: REHABILITATION | Facility: HOSPITAL | Age: 72
End: 2022-12-15
Payer: MEDICARE

## 2022-12-15 DIAGNOSIS — R26.9 ABNORMALITY OF GAIT: ICD-10-CM

## 2022-12-15 DIAGNOSIS — R53.1 DECREASED STRENGTH, ENDURANCE, AND MOBILITY: ICD-10-CM

## 2022-12-15 DIAGNOSIS — R26.89 BALANCE PROBLEM: Primary | ICD-10-CM

## 2022-12-15 DIAGNOSIS — R68.89 DECREASED STRENGTH, ENDURANCE, AND MOBILITY: ICD-10-CM

## 2022-12-15 DIAGNOSIS — Z74.09 DECREASED STRENGTH, ENDURANCE, AND MOBILITY: ICD-10-CM

## 2022-12-15 PROCEDURE — 97110 THERAPEUTIC EXERCISES: CPT | Mod: PN

## 2022-12-15 NOTE — PROGRESS NOTES
"[facility]   Physical Therapy Treatment Note    Name: Marie Lejeune  Clinic Number: [id number]    Therapy Diagnosis:   Encounter Diagnoses   Name Primary?    Balance problem Yes    Decreased strength, endurance, and mobility     Abnormality of gait        Physician: Veena Joshua MD    Visit Date: 12/15/2022    Physician Orders: PT Eval and Treat MS  Medical Diagnosis from Referral: MS (multiple sclerosis) [G35], Gait disturbance [R26.9]  Evaluation Date: 9/9/2022  Authorization Period Expiration: 8/22/23  Plan of Care Expiration: UPDATED 12/30/22  Visit # / Visits authorized: 1/ 1, 13/25   PN: 12/17/22    PTA Visit #: 0/5     Time In: 10:32 AM  Time Out: 11:13 AM  Total Billable Time: 40 minutes    Precautions: Standard, Fall, and Multiple Sclerosis - do not fatigue     SUBJECTIVE     Pt reports: feels like she is getting stronger. She has been doing her exercises at home    She was compliant with home exercise program.  Response to previous treatment: 1 day of soreness   Functional change: ongoing     Pain: 0/10  Location: NA    Fatigue: 0/10     OBJECTIVE     Objective Measures updated at progress report unless specified.     Treatment     Maryann received the treatments listed below:      therapeutic exercises to develop strength, endurance, ROM, posture, and core stabilization for 40 minutes including:    Nustep 8 min - level 1.0 - 4/10 fatigue - BUE/BLE     At parallel bars:    Heel raises 10x2 - 6/10 - 2# ankle weights     Hip ABD 10x2 bilateral - 6/10 fatigue  - 2# ankle weights    Lateral stepping x6 lengths - 6/10 fatigue  - 2# ankle weights                 Step up 4" 10x2 - 7/10, 7/10  - 2# ankle weights                               Patient Education and Home Exercises     Home Exercises Provided and Patient Education Provided     Education provided:   - role of PT, plan of care (POC), scheduling     Written Home Exercises Provided: Patient instructed to cont prior " HEP.  Exercises were reviewed and Maryann was able to demonstrate them prior to the end of the session.  Maryann demonstrated fair  understanding of the education provided.      See EMR under Patient Instructions for exercises provided at future visits and at previous bout of therapy visits.    ASSESSMENT   Maryann tolerated session well overall with no adverse response noted. Multiple seated rest breaks taken throughout session to avoid over fatigue. Improved clearance of B LE noted this session during step ups, less circumduction. Pt remains appropriate for therapy at this time.     Maryann Is progressing well towards her goals.   Pt prognosis is Good.     Pt will continue to benefit from skilled outpatient physical therapy to address the deficits listed in the problem list box on initial evaluation, provide pt/family education and to maximize pt's level of independence in the home and community environment.     Pt's spiritual, cultural and educational needs considered and pt agreeable to plan of care and goals.     Anticipated barriers to physical therapy: disease progression, scheduling    Goals: as of 11/17/22  Short Term Goals: 4 weeks   1. Pt will perform all bed mobility tasks with CGA or better so that they can improve trunk control and decrease caregiver burden. - improving  2. Patient will complete stand pivot transfer with CGA and appropriate assistive device with good body mechanics to promote safe functional transfers. - MET 10/14/22  3. Patient will transfer sit to/from standing with CGA and appropriate assistive device with good body mechanics to promote safe functional transfers. - MET 10/14/22  4. Patient to tolerate performance of 5 times sit to stand to demonstrate improvement in lower extremity endurance. - MET 10/14/22  5. Patient to tolerate MCTSIB testing/completion of testing to track balance and improvements in static balance. - MET 10/14/22  6. Patient to perform Timed Up and Go at 47 seconds  demonstrating improvement in functional mobility. - MET 10/14/22     Long Term Goals: 8 weeks   1. Pt will perform all bed mobility tasks with SPV or better so that they can improve trunk control and decrease caregiver burden. - improving  2. Patient will be SBA with home exercise program (HEP) to promote continued rehabilitation following discharge.  3. Patient will complete stand pivot transfer with SBA and appropriate assistive device with good body mechanics to promote safe functional transfers. - improving  4. Patient will transfer sit to/from standing with SBA and appropriate assistive device with good body mechanics to promote safe functional transfers. - improving  6. Patient to perform Timed Up and Go at 41 seconds demonstrating improvement in functional mobility. - MET 11/17/22  7. NEW: Patient to perform 5xSTS at 22 seconds demonstrating improvement in functional strength. - improving   8. NEW: Patient to perform gait speed at 0.4 m/s demonstrating improved safety with household ambulation.    - improving   9. NEW: Patient to perform MCTSIB at 15+ seconds for all 4 conditions to improve safety with static standing for in home tasks.  - improving     PLAN   Current Plan of care Certification: Updated 12/30/22     Continue at 1x/wk. Continue to progress balance and strengthening while monitoring fatigue.     Elizabeth Peterson, PT, DPT  12/15/2022

## 2022-12-27 ENCOUNTER — TELEPHONE (OUTPATIENT)
Dept: NEUROLOGY | Facility: CLINIC | Age: 72
End: 2022-12-27
Payer: MEDICARE

## 2022-12-27 NOTE — TELEPHONE ENCOUNTER
----- Message from Dejah Cordero, LATISHA, CNS sent at 12/2/2022 12:29 PM CST -----  She needs a f/u with [facility] in early 2023---first available.

## 2022-12-27 NOTE — TELEPHONE ENCOUNTER
Spoke to pt and scheduled her for a f/u with [facility] at [facility] next available on 4/19/23 at 10:20 AM. Pt originally did not want to schedule appt because  was not home and she relies on him for transportation. I told pt that I can just schedule her now so the spot doesn't get taken and she could call back if there was an issue with the appt. Pt agreed that was a better idea.

## 2022-12-29 ENCOUNTER — CLINICAL SUPPORT (OUTPATIENT)
Dept: REHABILITATION | Facility: HOSPITAL | Age: 72
End: 2022-12-29
Payer: MEDICARE

## 2022-12-29 DIAGNOSIS — R26.9 ABNORMALITY OF GAIT: ICD-10-CM

## 2022-12-29 DIAGNOSIS — R26.89 BALANCE PROBLEM: Primary | ICD-10-CM

## 2022-12-29 DIAGNOSIS — R53.1 DECREASED STRENGTH, ENDURANCE, AND MOBILITY: ICD-10-CM

## 2022-12-29 DIAGNOSIS — R68.89 DECREASED STRENGTH, ENDURANCE, AND MOBILITY: ICD-10-CM

## 2022-12-29 DIAGNOSIS — Z74.09 DECREASED STRENGTH, ENDURANCE, AND MOBILITY: ICD-10-CM

## 2022-12-29 PROCEDURE — 97110 THERAPEUTIC EXERCISES: CPT | Mod: PN

## 2022-12-29 NOTE — PROGRESS NOTES
[facility]   Physical Therapy Treatment/Progress Note and Plan of Care Update    Name: Marie Lejeune  Clinic Number: [id number]    Therapy Diagnosis:   Encounter Diagnoses   Name Primary?    Balance problem Yes    Decreased strength, endurance, and mobility     Abnormality of gait        Physician: Veena Joshua MD    Visit Date: 12/29/2022    Physician Orders: PT Eval and Treat MS  Medical Diagnosis from Referral: MS (multiple sclerosis) [G35], Gait disturbance [R26.9]  Evaluation Date: 9/9/2022  Authorization Period Expiration: 8/22/23  Plan of Care Expiration: 12/30/22 EXTEND to 3/29/23  Visit # / Visits authorized: 1/ 1, 14/25   PN: 12/17/22    PTA Visit #: 0/5     Time In: 10:35 AM  Time Out: 11:15 AM  Total Billable Time: 40 minutes    Precautions: Standard, Fall, and Multiple Sclerosis - do not fatigue     SUBJECTIVE     Pt reports: feels like she is getting stronger. She has been doing her exercises at home    She was compliant with home exercise program.  Response to previous treatment: 1 day of soreness   Functional change: ongoing     Pain: 0/10  Location: NA    Fatigue: 0/10     OBJECTIVE          Evaluation 10/14/22 11/17/22 12/29/22   5 times sit-stand NT  >12 sec= fall risk for general elderly  >16 sec= fall risk for Parkinson's disease  >10 sec= balance/vestibular dysfunction (<59 y/o)  >14.2 sec= balance/vestibular dysfunction (>59 y/o)  >12 sec= fall risk for CVA 28 seconds - no upper extremity  24 seconds - no upper extremity        16 seconds - no upper extremity    Hopper/ FGA/ Tinetti NT NT NT NT   Timed Up and Go 53 sec  < 20 sec safe for independent transfers,      < 30 sec assist required for transfers 42 seconds - rollator 33 seconds - rollator  29.6 seconds - rollator   6 meter walk test 0.11 m/s 0.26 m/s  0.31 m/s     7/10 fatigue  0.27 m/s   6 min walk test NT NT 2 min - 100 ft  4 min - 195 ft / 60 m     6/10 fatigue  2 min - 117   4 min - 212  6 min - 300  ft  7/10 fatigue      Postural control:  MCTSIB:  1. Eyes Open/feet together/Firm: NT seconds --> 30 seconds --> 30 seconds  2. Eyes Closed/feet together/Firm: NT seconds --> 5 seconds --> 5 seconds  3. Eyes Open/feet together/Foam: NT seconds --> 30 seconds --> 5 seconds  4. Eyes Closed/feet together/Foam: NT seconds --> 5 seconds --> NT     Gait Assessment:   - AD used: rollator   - Assistance: SBA - Sosa   - Distance: 50 feet      GAIT DEVIATIONS:  Gait component performance: decreased speed, decreased step length and stride length, increased step width, increased weightshift from left to right without trunk rotation or hip rotation; bilateral lower extremities with external rotation, no toe off or heel strike; rollator height increased and arms extended at 90 degrees of flexion       Endurance Deficit: severe --> moderate    Treatment     Maryann received the treatments listed below:      therapeutic exercises to develop strength, endurance, ROM, posture, and core stabilization for 40 minutes including:    Testing listed above.     Nustep 8 min - level 1.0 - 4/10 fatigue - BUE/BLE     At parallel bars:    WBOS, foam, eyes open 2x30 seconds  WBOS, firm, eyes closed 2x30 seconds                              Patient Education and Home Exercises     Home Exercises Provided and Patient Education Provided     Education provided:   - role of PT, plan of care (POC), scheduling     Written Home Exercises Provided: Patient instructed to cont prior HEP.  Exercises were reviewed and Maryann was able to demonstrate them prior to the end of the session.  Maryann demonstrated fair  understanding of the education provided.      See EMR under Patient Instructions for exercises provided at future visits and at previous bout of therapy visits.    ASSESSMENT   Maryann tolerated session well overall with no adverse response noted. Pt progress note performed this session. Pt with significant improvement in functional strength as demonstrated  by improved 5 time sit to stand score. However, score remains below age related normative data indicating risk for impaired functional independence. Pt with improvement noted in functional mobility and safety as indicated by decrease in time to complete TUG. However, TUG score remains below cut off scores indicating increased risk for falls. Significant improvement in CV endurance and functional mobility noted as indicated by ability to complete 6 min walk test. Distances also increased during 2 minute walk test and 4 minute marker compared to previous assessment. Slight decline noted in ability to balance with vision eliminated and on compliant surface during MCTSIB. Activities reinitiated this session to challenge vestibular system. Pt met 2 new LTGs this date (3/8 LTGs and 5/6 STGs total). Goals for 5x STS and TUG updated to reflect progress. Pt would benefit from continued skilled PT services in order to continue improvement in strength, endurance, functional mobility, safety, and physical independence. Plan to extend POC x 3 months in order to progress toward listed goals. Pt is motivated to continue therapy and is in agreement with POC.     Maryann Is progressing well towards her goals.   Pt prognosis is Good.     Pt will continue to benefit from skilled outpatient physical therapy to address the deficits listed in the problem list box on initial evaluation, provide pt/family education and to maximize pt's level of independence in the home and community environment.     Pt's spiritual, cultural and educational needs considered and pt agreeable to plan of care and goals.     Anticipated barriers to physical therapy: disease progression, scheduling    Goals: as of 11/17/22  Short Term Goals: 4 weeks   1. Pt will perform all bed mobility tasks with CGA or better so that they can improve trunk control and decrease caregiver burden. - improving  2. Patient will complete stand pivot transfer with CGA and appropriate  assistive device with good body mechanics to promote safe functional transfers. - MET 10/14/22  3. Patient will transfer sit to/from standing with CGA and appropriate assistive device with good body mechanics to promote safe functional transfers. - MET 10/14/22  4. Patient to tolerate performance of 5 times sit to stand to demonstrate improvement in lower extremity endurance. - MET 10/14/22  5. Patient to tolerate MCTSIB testing/completion of testing to track balance and improvements in static balance. - MET 10/14/22  6. Patient to perform Timed Up and Go at 47 seconds demonstrating improvement in functional mobility. - MET 10/14/22     Long Term Goals: 8 weeks   1. Pt will perform all bed mobility tasks with SPV or better so that they can improve trunk control and decrease caregiver burden. - improving  2. Patient will be SBA with home exercise program (HEP) to promote continued rehabilitation following discharge. -MET 12/29/22  3. Patient will complete stand pivot transfer with SBA and appropriate assistive device with good body mechanics to promote safe functional transfers. - improving  4. Patient will transfer sit to/from standing with SBA and appropriate assistive device with good body mechanics to promote safe functional transfers. - improving  5. Patient to perform Timed Up and Go at 41 seconds demonstrating improvement in functional mobility. - MET 11/17/22  6. Patient to perform 5xSTS at 22 seconds demonstrating improvement in functional strength. - MET 12/29/22   9. Updated: Pt to perform 5xSTS at < / = 15 seconds in order to demonstrate improvement in functional strength.   7. Patient to perform gait speed at 0.4 m/s demonstrating improved safety with household ambulation.    - improving   8. Patient to perform MCTSIB at 15+ seconds for all 4 conditions to improve safety with static standing for in home tasks.  - improving   10. NEW: Pt to perform TUG score in < / = 25 seconds in order to improve  functional mobility and safety.  11. NEW: Pt to improve 6 minute walk test distance by > / = meters in order to improve functional mobility and independence.       PLAN   Current Plan of care Certification: 12/30/22 EXTEND to 3/29/23    Extend POC x 3 months      Continue at 1x/wk x 12 weeks with focus on Neuro-Reeducation, There-Ex, There-Act, Patient Education, Gait Training, Self Care. Continue to progress balance and strengthening while monitoring fatigue.     Elizabeth Peterson, PT, DPT  12/29/2022

## 2022-12-29 NOTE — PLAN OF CARE
Outpatient Therapy Updated Plan of Care     Visit Date: 12/29/2022  Name: Marie Lejeune  Clinic Number: [id number]    Therapy Diagnosis:   Encounter Diagnoses   Name Primary?    Balance problem Yes    Decreased strength, endurance, and mobility     Abnormality of gait      Physician: Veena Joshua MD    Physician Orders: PT Eval and Treat MS  Medical Diagnosis from Referral: MS (multiple sclerosis) [G35], Gait disturbance [R26.9]  Evaluation Date: 9/9/2022    Total Visits Received: 14  Cancelled Visits: 0  No Show Visits: 0    Current Certification Period: 12/30/22  Precautions:  fatigue, fall  Visits from Evaluation Date:  14  Functional Level Prior to Evaluation:  impaired endurance, impaired strength, impaired balance    Subjective     Update: Pt reports: feels like she is getting stronger. She has been doing her exercises at home    Objective     Update:     Evaluation 10/14/22 11/17/22 12/29/22   5 times sit-stand NT  >12 sec= fall risk for general elderly  >16 sec= fall risk for Parkinson's disease  >10 sec= balance/vestibular dysfunction (<59 y/o)  >14.2 sec= balance/vestibular dysfunction (>59 y/o)  >12 sec= fall risk for CVA 28 seconds - no upper extremity  24 seconds - no upper extremity        16 seconds - no upper extremity    Hopper/ FGA/ Tinetti NT NT NT NT   Timed Up and Go 53 sec  < 20 sec safe for independent transfers,      < 30 sec assist required for transfers 42 seconds - rollator 33 seconds - rollator  29.6 seconds - rollator   6 meter walk test 0.11 m/s 0.26 m/s  0.31 m/s     7/10 fatigue  0.27 m/s   6 min walk test NT NT 2 min - 100 ft  4 min - 195 ft / 60 m     6/10 fatigue  2 min - 117   4 min - 212  6 min - 300 ft  7/10 fatigue      Postural control:  MCTSIB:  1. Eyes Open/feet together/Firm: NT seconds --> 30 seconds --> 30 seconds  2. Eyes Closed/feet together/Firm: NT seconds --> 5 seconds --> 5 seconds  3. Eyes Open/feet together/Foam: NT seconds --> 30 seconds --> 5 seconds  4.  Eyes Closed/feet together/Foam: NT seconds --> 5 seconds --> NT     Gait Assessment:   - AD used: rollator   - Assistance: SBA - Sosa   - Distance: 50 feet      GAIT DEVIATIONS:  Gait component performance: decreased speed, decreased step length and stride length, increased step width, increased weightshift from left to right without trunk rotation or hip rotation; bilateral lower extremities with external rotation, no toe off or heel strike; rollator height increased and arms extended at 90 degrees of flexion       Endurance Deficit: severe --> moderate       Assessment     Update: Maryann tolerated session well overall with no adverse response noted. Pt progress note performed this session. Pt with significant improvement in functional strength as demonstrated by improved 5 time sit to stand score. However, score remains below age related normative data indicating risk for impaired functional independence. Pt with improvement noted in functional mobility and safety as indicated by decrease in time to complete TUG. However, TUG score remains below cut off scores indicating increased risk for falls. Significant improvement in CV endurance and functional mobility noted as indicated by ability to complete 6 min walk test. Distances also increased during 2 minute walk test and 4 minute marker compared to previous assessment. Slight decline noted in ability to balance with vision eliminated and on compliant surface during MCTSIB. Activities reinitiated this session to challenge vestibular system. Pt met 2 new LTGs this date (3/8 LTGs and 5/6 STGs total). Goals for 5x STS and TUG updated to reflect progress. Pt would benefit from continued skilled PT services in order to continue improvement in strength, endurance, functional mobility, safety, and physical independence. Plan to extend POC x 3 months in order to progress toward listed goals. Pt is motivated to continue therapy and is in agreement with POC.      Maryann Is  progressing well towards her goals.   Pt prognosis is Good.      Pt will continue to benefit from skilled outpatient physical therapy to address the deficits listed in the problem list box on initial evaluation, provide pt/family education and to maximize pt's level of independence in the home and community environment.      Pt's spiritual, cultural and educational needs considered and pt agreeable to plan of care and goals.     Anticipated barriers to physical therapy: disease progression, scheduling     Goals: as of 11/17/22  Short Term Goals: 4 weeks   1. Pt will perform all bed mobility tasks with CGA or better so that they can improve trunk control and decrease caregiver burden. - improving  2. Patient will complete stand pivot transfer with CGA and appropriate assistive device with good body mechanics to promote safe functional transfers. - MET 10/14/22  3. Patient will transfer sit to/from standing with CGA and appropriate assistive device with good body mechanics to promote safe functional transfers. - MET 10/14/22  4. Patient to tolerate performance of 5 times sit to stand to demonstrate improvement in lower extremity endurance. - MET 10/14/22  5. Patient to tolerate MCTSIB testing/completion of testing to track balance and improvements in static balance. - MET 10/14/22  6. Patient to perform Timed Up and Go at 47 seconds demonstrating improvement in functional mobility. - MET 10/14/22     Long Term Goals: 8 weeks   1. Pt will perform all bed mobility tasks with SPV or better so that they can improve trunk control and decrease caregiver burden. - improving  2. Patient will be SBA with home exercise program (HEP) to promote continued rehabilitation following discharge. -MET 12/29/22  3. Patient will complete stand pivot transfer with SBA and appropriate assistive device with good body mechanics to promote safe functional transfers. - improving  4. Patient will transfer sit to/from standing with SBA and  appropriate assistive device with good body mechanics to promote safe functional transfers. - improving  5. Patient to perform Timed Up and Go at 41 seconds demonstrating improvement in functional mobility. - MET 11/17/22  6. Patient to perform 5xSTS at 22 seconds demonstrating improvement in functional strength. - MET 12/29/22              9. Updated: Pt to perform 5xSTS at < / = 15 seconds in order to demonstrate improvement in functional strength.   7. Patient to perform gait speed at 0.4 m/s demonstrating improved safety with household ambulation.    - improving   8. Patient to perform MCTSIB at 15+ seconds for all 4 conditions to improve safety with static standing for in home tasks.  - improving   10. NEW: Pt to perform TUG score in < / = 25 seconds in order to improve functional mobility and safety.  11. NEW: Pt to improve 6 minute walk test distance by > / = meters in order to improve functional mobility and independence.     Reasons for Recertification of Therapy:   continued progress, continued fall risk, room for improvement in functional status    Plan     Updated Certification Period: 12/29/2022 to 3/29/23  Recommended Treatment Plan: 1 times per week for 12 weeks: Gait Training, Manual Therapy, Moist Heat/ Ice, Neuromuscular Re-ed, Patient Education, Self Care, Therapeutic Activities, and Therapeutic Exercise  Other Recommendations: none    Elizabeth Peterson, PT  12/29/2022      I CERTIFY THE NEED FOR THESE SERVICES FURNISHED UNDER THIS PLAN OF TREATMENT AND WHILE UNDER MY CARE    Physician's comments:        Physician's Signature: ___________________________________________________

## 2023-01-06 ENCOUNTER — CLINICAL SUPPORT (OUTPATIENT)
Dept: REHABILITATION | Facility: HOSPITAL | Age: 73
End: 2023-01-06
Payer: MEDICARE

## 2023-01-06 DIAGNOSIS — R26.89 BALANCE PROBLEM: Primary | ICD-10-CM

## 2023-01-06 DIAGNOSIS — R26.9 ABNORMALITY OF GAIT: ICD-10-CM

## 2023-01-06 DIAGNOSIS — R68.89 DECREASED STRENGTH, ENDURANCE, AND MOBILITY: ICD-10-CM

## 2023-01-06 DIAGNOSIS — Z74.09 DECREASED STRENGTH, ENDURANCE, AND MOBILITY: ICD-10-CM

## 2023-01-06 DIAGNOSIS — R53.1 DECREASED STRENGTH, ENDURANCE, AND MOBILITY: ICD-10-CM

## 2023-01-06 PROCEDURE — 97110 THERAPEUTIC EXERCISES: CPT | Mod: PN

## 2023-01-06 NOTE — PROGRESS NOTES
[facility]   Physical Therapy Treatment/Progress Note and Plan of Care Update    Name: Marie Lejeune  Clinic Number: [id number]    Therapy Diagnosis:   Encounter Diagnoses   Name Primary?    Balance problem Yes    Decreased strength, endurance, and mobility     Abnormality of gait        Physician: Veena Joshua MD    Visit Date: 1/6/2023    Physician Orders: PT Eval and Treat MS  Medical Diagnosis from Referral: MS (multiple sclerosis) [G35], Gait disturbance [R26.9]  Evaluation Date: 9/9/2022  Authorization Period Expiration: 8/22/23  Plan of Care Expiration: 12/30/22 EXTEND to 3/29/23  Visit # / Visits authorized: 1/20 (16 total)   PN: 12/17/22    PTA Visit #: 0/5     Time In: 12:05 PM  Time Out: 12:45 PM  Total Billable Time: 40 minutes    Precautions: Standard, Fall, and Multiple Sclerosis - do not fatigue     SUBJECTIVE     Pt reports: feels like she is getting stronger. She has been doing her exercises at home    She was compliant with home exercise program.  Response to previous treatment: 1 day of soreness   Functional change: ongoing     Pain: 0/10  Location: NA    Fatigue: 0/10     OBJECTIVE      None taken this date.     Treatment     Maryann received the treatments listed below:      therapeutic exercises to develop strength, endurance, ROM, posture, and core stabilization for 40 minutes including:    Nustep 8 min - level 1.0 - 4/10 fatigue - BUE/BLE     At parallel bars:    WBOS, foam, eyes open 2x30 seconds  WBOS, firm, eyes closed 2x30 seconds              At parallel bars:               Heel raises 10x2 - 6/10 - 2# ankle weights   Hip ABD 10x2 bilateral - 6/10 fatigue  - 2# ankle weights               Lateral stepping x6 lengths - 6/10 fatigue  - 2# ankle weights      Step ups to 4 in 2x10 ea LE    Patient Education and Home Exercises     Home Exercises Provided and Patient Education Provided     Education provided:   - role of PT, plan of care (POC), scheduling      Written Home Exercises Provided: Patient instructed to cont prior HEP.  Exercises were reviewed and Maryann was able to demonstrate them prior to the end of the session.  Maryann demonstrated fair  understanding of the education provided.      See EMR under Patient Instructions for exercises provided at future visits and at previous bout of therapy visits.    ASSESSMENT   Maryann tolerated session well overall with no adverse response noted. Continuation of balance challenges this session with good performance. Intermittent UE use throughout for safety. Multiple seated rest breaks taken throughout session to prevent over fatigue. Pt remains appropriate for therapy at this time.     Maryann Is progressing well towards her goals.   Pt prognosis is Good.     Pt will continue to benefit from skilled outpatient physical therapy to address the deficits listed in the problem list box on initial evaluation, provide pt/family education and to maximize pt's level of independence in the home and community environment.     Pt's spiritual, cultural and educational needs considered and pt agreeable to plan of care and goals.     Anticipated barriers to physical therapy: disease progression, scheduling    Goals: as of 11/17/22  Short Term Goals: 4 weeks   1. Pt will perform all bed mobility tasks with CGA or better so that they can improve trunk control and decrease caregiver burden. - improving  2. Patient will complete stand pivot transfer with CGA and appropriate assistive device with good body mechanics to promote safe functional transfers. - MET 10/14/22  3. Patient will transfer sit to/from standing with CGA and appropriate assistive device with good body mechanics to promote safe functional transfers. - MET 10/14/22  4. Patient to tolerate performance of 5 times sit to stand to demonstrate improvement in lower extremity endurance. - MET 10/14/22  5. Patient to tolerate MCTSIB testing/completion of testing to track balance and  improvements in static balance. - MET 10/14/22  6. Patient to perform Timed Up and Go at 47 seconds demonstrating improvement in functional mobility. - MET 10/14/22     Long Term Goals: 8 weeks   1. Pt will perform all bed mobility tasks with SPV or better so that they can improve trunk control and decrease caregiver burden. - improving  2. Patient will be SBA with home exercise program (HEP) to promote continued rehabilitation following discharge. -MET 12/29/22  3. Patient will complete stand pivot transfer with SBA and appropriate assistive device with good body mechanics to promote safe functional transfers. - improving  4. Patient will transfer sit to/from standing with SBA and appropriate assistive device with good body mechanics to promote safe functional transfers. - improving  5. Patient to perform Timed Up and Go at 41 seconds demonstrating improvement in functional mobility. - MET 11/17/22  6. Patient to perform 5xSTS at 22 seconds demonstrating improvement in functional strength. - MET 12/29/22   9. Updated: Pt to perform 5xSTS at < / = 15 seconds in order to demonstrate improvement in functional strength.   7. Patient to perform gait speed at 0.4 m/s demonstrating improved safety with household ambulation.    - improving   8. Patient to perform MCTSIB at 15+ seconds for all 4 conditions to improve safety with static standing for in home tasks.  - improving   10. NEW: Pt to perform TUG score in < / = 25 seconds in order to improve functional mobility and safety.  11. NEW: Pt to improve 6 minute walk test distance by > / = meters in order to improve functional mobility and independence.       PLAN   Current Plan of care Certification: 12/30/22 EXTEND to 3/29/23    Current POC: Continue at 1x/wk x 12 weeks with focus on Neuro-Reeducation, There-Ex, There-Act, Patient Education, Gait Training, Self Care. Continue to progress balance and strengthening while monitoring fatigue.     Continue static/dynamic  balance challenges as appropriate.     Elizabeth Peterson, PT, DPT  01/13/2023

## 2023-01-12 ENCOUNTER — CLINICAL SUPPORT (OUTPATIENT)
Dept: REHABILITATION | Facility: HOSPITAL | Age: 73
End: 2023-01-12
Payer: MEDICARE

## 2023-01-12 DIAGNOSIS — R53.1 DECREASED STRENGTH, ENDURANCE, AND MOBILITY: ICD-10-CM

## 2023-01-12 DIAGNOSIS — R26.89 BALANCE PROBLEM: Primary | ICD-10-CM

## 2023-01-12 DIAGNOSIS — R68.89 DECREASED STRENGTH, ENDURANCE, AND MOBILITY: ICD-10-CM

## 2023-01-12 DIAGNOSIS — R26.9 ABNORMALITY OF GAIT: ICD-10-CM

## 2023-01-12 DIAGNOSIS — Z74.09 DECREASED STRENGTH, ENDURANCE, AND MOBILITY: ICD-10-CM

## 2023-01-12 PROCEDURE — 97110 THERAPEUTIC EXERCISES: CPT | Mod: PN

## 2023-01-12 NOTE — PROGRESS NOTES
"[facility]   Physical Therapy Treatment Note    Name: Marie Lejeune  Clinic Number: [id number]    Therapy Diagnosis:   Encounter Diagnoses   Name Primary?    Balance problem Yes    Decreased strength, endurance, and mobility     Abnormality of gait      Physician: Veena Joshua MD    Visit Date: 1/12/2023    Physician Orders: PT Eval and Treat MS  Medical Diagnosis from Referral: MS (multiple sclerosis) [G35], Gait disturbance [R26.9]  Evaluation Date: 9/9/2022  Authorization Period Expiration: 8/22/23  Plan of Care Expiration: 12/30/22 EXTEND to 3/29/23  Visit # / Visits authorized: 2/20 (17 total)   PN: 12/17/22    PTA Visit #: 0/5     Time In: 10:32 AM  Time Out: 11:13 AM  Total Billable Time: 41 minutes    Precautions: Standard, Fall, and Multiple Sclerosis - do not fatigue     SUBJECTIVE     Pt reports: Patient states she is having a good day today. Notes that her exercises have been going well and she continues to do the Nustep everyday.     She was compliant with home exercise program.  Response to previous treatment: no adverse response  Functional change: ongoing     Pain: 0/10  Location: NA    Fatigue: 3/10     OBJECTIVE     Objective Measures updated at progress report unless specified.     Treatment     Maryann received the treatments listed below:      therapeutic exercises to develop strength, endurance, ROM, posture, and core stabilization for 41 minutes including:    Nustep 8 min - level 1.0 - 4/10 fatigue - BUE/BLE     At parallel bars:    Heel raises 10x2 - 6/10 - 2# ankle weights      Mini squats, bilateral upper extremity support, 2x10   Lateral stepping x6 lengths - 5/10 fatigue  - 2# ankle weights                 Step up 4" 10x2 - 7/10, 7/10  - 2# ankle weights   WBOS, foam, 4x30 seconds                              Patient Education and Home Exercises     Home Exercises Provided and Patient Education Provided     Education provided:   - role of PT, plan of care " (POC), scheduling     Written Home Exercises Provided: Patient instructed to cont prior HEP.  Exercises were reviewed and Maryann was able to demonstrate them prior to the end of the session.  Maryann demonstrated fair  understanding of the education provided.      See EMR under Patient Instructions for exercises provided at future visits and at previous bout of therapy visits.    ASSESSMENT   Maryann tolerated session well overall with no adverse response noted. Patient required multiple seated rest breaks due to avoid levels of fatigue. Patient continues to improve lower extremity clearance during gait. Patient continues to be appropriate for therapy.       Maryann Is progressing well towards her goals.   Pt prognosis is Good.     Pt will continue to benefit from skilled outpatient physical therapy to address the deficits listed in the problem list box on initial evaluation, provide pt/family education and to maximize pt's level of independence in the home and community environment.     Pt's spiritual, cultural and educational needs considered and pt agreeable to plan of care and goals.     Anticipated barriers to physical therapy: disease progression, scheduling    Goals: as of 11/17/22  Short Term Goals: 4 weeks   1. Pt will perform all bed mobility tasks with CGA or better so that they can improve trunk control and decrease caregiver burden. - improving  2. Patient will complete stand pivot transfer with CGA and appropriate assistive device with good body mechanics to promote safe functional transfers. - MET 10/14/22  3. Patient will transfer sit to/from standing with CGA and appropriate assistive device with good body mechanics to promote safe functional transfers. - MET 10/14/22  4. Patient to tolerate performance of 5 times sit to stand to demonstrate improvement in lower extremity endurance. - MET 10/14/22  5. Patient to tolerate MCTSIB testing/completion of testing to track balance and improvements in static  balance. - MET 10/14/22  6. Patient to perform Timed Up and Go at 47 seconds demonstrating improvement in functional mobility. - MET 10/14/22     Long Term Goals: 8 weeks   1. Pt will perform all bed mobility tasks with SPV or better so that they can improve trunk control and decrease caregiver burden. - improving  2. Patient will be SBA with home exercise program (HEP) to promote continued rehabilitation following discharge.  3. Patient will complete stand pivot transfer with SBA and appropriate assistive device with good body mechanics to promote safe functional transfers. - improving  4. Patient will transfer sit to/from standing with SBA and appropriate assistive device with good body mechanics to promote safe functional transfers. - improving  6. Patient to perform Timed Up and Go at 41 seconds demonstrating improvement in functional mobility. - MET 11/17/22  7. NEW: Patient to perform 5xSTS at 22 seconds demonstrating improvement in functional strength. - improving   8. NEW: Patient to perform gait speed at 0.4 m/s demonstrating improved safety with household ambulation.    - improving   9. NEW: Patient to perform MCTSIB at 15+ seconds for all 4 conditions to improve safety with static standing for in home tasks.  - improving     PLAN   Current Plan of care Certification: Updated 12/30/22     Continue at 1x/wk. Continue to progress balance and strengthening while monitoring fatigue.     Veena Nick, PT, DPT  01/17/2023

## 2023-01-23 ENCOUNTER — CLINICAL SUPPORT (OUTPATIENT)
Dept: REHABILITATION | Facility: HOSPITAL | Age: 73
End: 2023-01-23
Payer: MEDICARE

## 2023-01-23 DIAGNOSIS — Z74.09 DECREASED STRENGTH, ENDURANCE, AND MOBILITY: ICD-10-CM

## 2023-01-23 DIAGNOSIS — R68.89 DECREASED STRENGTH, ENDURANCE, AND MOBILITY: ICD-10-CM

## 2023-01-23 DIAGNOSIS — R26.89 BALANCE PROBLEM: Primary | ICD-10-CM

## 2023-01-23 DIAGNOSIS — R26.9 ABNORMALITY OF GAIT: ICD-10-CM

## 2023-01-23 DIAGNOSIS — R53.1 DECREASED STRENGTH, ENDURANCE, AND MOBILITY: ICD-10-CM

## 2023-01-23 PROCEDURE — 97110 THERAPEUTIC EXERCISES: CPT | Mod: PN

## 2023-01-23 NOTE — PROGRESS NOTES
"[facility]   Physical Therapy Treatment Note    Name: Marie Lejeune  Clinic Number: [id number]    Therapy Diagnosis:   Encounter Diagnoses   Name Primary?    Balance problem Yes    Decreased strength, endurance, and mobility     Abnormality of gait        Physician: Veena Joshua MD    Visit Date: 1/23/2023    Physician Orders: PT Eval and Treat MS  Medical Diagnosis from Referral: MS (multiple sclerosis) [G35], Gait disturbance [R26.9]  Evaluation Date: 9/9/2022  Authorization Period Expiration: 8/22/23  Plan of Care Expiration: 12/30/22 EXTEND to 3/29/23  Visit # / Visits authorized: 3/20 (18 total)   PN: 1/29/23    Time In: 11:20 AM  Time Out: 12:00 PM  Total Billable Time: 40 minutes    Precautions: Standard, Fall, and Multiple Sclerosis - do not fatigue     SUBJECTIVE     Pt reports: She is doing well today, compliant with exercises at home.     She was compliant with home exercise program.  Response to previous treatment: no adverse response  Functional change: ongoing     Pain: 0/10  Location: NA    Fatigue: 3/10     OBJECTIVE     Objective Measures updated at progress report unless specified.     Treatment     Maryann received the treatments listed below:      therapeutic exercises to develop strength, endurance, ROM, posture, and core stabilization for 40 minutes including:    Nustep 8 min - level 1.0 - 4/10 fatigue - BUE/BLE     At parallel bars:    Hip Abduction 2x10   Hip Extension 2x10   Heel raises 10x2 - 6/10 - 2# ankle weights      Mini squats, bilateral upper extremity support, 2x10   Lateral stepping x6 lengths - 5/10 fatigue  - 2# ankle weights                 Step up 4" 10x2 - 7/10, 7/10  - 2# ankle weights   WBOS, foam, eyes open  2x30 seconds              WBOS, eyes closed, firm 2x30 seconds              Patient Education and Home Exercises     Home Exercises Provided and Patient Education Provided     Education provided:   - role of PT, plan of care (POC), " scheduling     Written Home Exercises Provided: Patient instructed to cont prior HEP.  Exercises were reviewed and Maryann was able to demonstrate them prior to the end of the session.  Maryann demonstrated fair  understanding of the education provided.      See EMR under Patient Instructions for exercises provided at future visits and at previous bout of therapy visits.    ASSESSMENT   Maryann tolerated session well overall with no adverse response noted. Patient required multiple seated rest breaks to avoid over fatigue this date. Pt with most difficulty balancing with vision eliminated. Intermittent UE support for safety as needed. Patient continues to be appropriate for therapy.     Maryann Is progressing well towards her goals.   Pt prognosis is Good.     Pt will continue to benefit from skilled outpatient physical therapy to address the deficits listed in the problem list box on initial evaluation, provide pt/family education and to maximize pt's level of independence in the home and community environment.     Pt's spiritual, cultural and educational needs considered and pt agreeable to plan of care and goals.     Anticipated barriers to physical therapy: disease progression, scheduling    Goals: as of 11/17/22  Short Term Goals: 4 weeks   1. Pt will perform all bed mobility tasks with CGA or better so that they can improve trunk control and decrease caregiver burden. - improving  2. Patient will complete stand pivot transfer with CGA and appropriate assistive device with good body mechanics to promote safe functional transfers. - MET 10/14/22  3. Patient will transfer sit to/from standing with CGA and appropriate assistive device with good body mechanics to promote safe functional transfers. - MET 10/14/22  4. Patient to tolerate performance of 5 times sit to stand to demonstrate improvement in lower extremity endurance. - MET 10/14/22  5. Patient to tolerate MCTSIB testing/completion of testing to track balance and  improvements in static balance. - MET 10/14/22  6. Patient to perform Timed Up and Go at 47 seconds demonstrating improvement in functional mobility. - MET 10/14/22     Long Term Goals: 8 weeks   1. Pt will perform all bed mobility tasks with SPV or better so that they can improve trunk control and decrease caregiver burden. - improving  2. Patient will be SBA with home exercise program (HEP) to promote continued rehabilitation following discharge.  3. Patient will complete stand pivot transfer with SBA and appropriate assistive device with good body mechanics to promote safe functional transfers. - improving  4. Patient will transfer sit to/from standing with SBA and appropriate assistive device with good body mechanics to promote safe functional transfers. - improving  6. Patient to perform Timed Up and Go at 41 seconds demonstrating improvement in functional mobility. - MET 11/17/22  7. NEW: Patient to perform 5xSTS at 22 seconds demonstrating improvement in functional strength. - improving   8. NEW: Patient to perform gait speed at 0.4 m/s demonstrating improved safety with household ambulation.    - improving   9. NEW: Patient to perform MCTSIB at 15+ seconds for all 4 conditions to improve safety with static standing for in home tasks.  - improving     PLAN   Current Plan of care Certification: 12/30/22 EXTEND to 3/29/23     Current POC: Continue at 1x/wk x 12 weeks with focus on Neuro-Reeducation, There-Ex, There-Act, Patient Education, Gait Training, Self Care. Continue to progress balance and strengthening while monitoring fatigue.     Continue B LE strength and static/dynamic balance challenges as tolerated.     Elizabeth Peterson, PT, DPT  01/24/2023

## 2023-02-01 ENCOUNTER — CLINICAL SUPPORT (OUTPATIENT)
Dept: REHABILITATION | Facility: HOSPITAL | Age: 73
End: 2023-02-01
Payer: MEDICARE

## 2023-02-01 DIAGNOSIS — R68.89 DECREASED STRENGTH, ENDURANCE, AND MOBILITY: ICD-10-CM

## 2023-02-01 DIAGNOSIS — R26.89 BALANCE PROBLEM: Primary | ICD-10-CM

## 2023-02-01 DIAGNOSIS — R26.9 ABNORMALITY OF GAIT: ICD-10-CM

## 2023-02-01 DIAGNOSIS — R53.1 DECREASED STRENGTH, ENDURANCE, AND MOBILITY: ICD-10-CM

## 2023-02-01 DIAGNOSIS — Z74.09 DECREASED STRENGTH, ENDURANCE, AND MOBILITY: ICD-10-CM

## 2023-02-01 PROCEDURE — 97110 THERAPEUTIC EXERCISES: CPT | Mod: PN

## 2023-02-01 NOTE — PROGRESS NOTES
"[facility]   Physical Therapy Treatment Note    Name: Marie Lejeune  Clinic Number: [id number]    Therapy Diagnosis:   Encounter Diagnoses   Name Primary?    Balance problem Yes    Decreased strength, endurance, and mobility     Abnormality of gait        Physician: Veena Joshua MD    Visit Date: 2/1/2023    Physician Orders: PT Eval and Treat MS  Medical Diagnosis from Referral: MS (multiple sclerosis) [G35], Gait disturbance [R26.9]  Evaluation Date: 9/9/2022  Authorization Period Expiration: 8/22/23  Plan of Care Expiration: 12/30/22 EXTEND to 3/29/23  Visit # / Visits authorized: 3/20 (18 total)   PN: 1/29/23    Time In: 1:01 PM  Time Out: 1:46 PM  Total Billable Time: 45 minutes    Precautions: Standard, Fall, and Multiple Sclerosis - do not fatigue     SUBJECTIVE     Pt reports: She is doing well today, compliant with exercises at home.     She was compliant with home exercise program.  Response to previous treatment: no adverse response  Functional change: ongoing     Pain: 0/10  Location: NA    Fatigue: 3/10     OBJECTIVE     Objective Measures updated at progress report unless specified.     Treatment     Maryann received the treatments listed below:      therapeutic exercises to develop strength, endurance, ROM, posture, and core stabilization for 45 minutes including:    Nustep 8 min - level 1.0 - 4/10 fatigue - BUE/BLE     At parallel bars:    Hip Abduction 2x10   Hip Extension 2x10   Knee flexion x10 B 6/10 and 7/10 fatigue level   Mini squats, bilateral upper extremity support, 2x10                Step up 4" 10x2 - 7/10, 7/10  - 2# ankle weights (7/10 leading with R side;   NBOS, foam, eyes open  2x20 seconds (6-7/10 fatigue first set)              WBOS, foam, eyes open x30 seconds               WBOS, foam, eyes closed x20"       Patient Education and Home Exercises     Home Exercises Provided and Patient Education Provided     Education provided:   - role of PT, plan of " care (POC), scheduling     Written Home Exercises Provided: Patient instructed to cont prior HEP.  Exercises were reviewed and Maryann was able to demonstrate them prior to the end of the session.  Maryann demonstrated fair  understanding of the education provided.      See EMR under Patient Instructions for exercises provided at future visits and at previous bout of therapy visits.    ASSESSMENT     Maryann doing well and needing minimal seated rest breaks to perform exercise today. Pt tolerating feet together on foam with eyes open without LOB or instability. Eyes closed conditions for balance still challenging for patient at this time. Cues needed for step up to reduce hip ER and circumduction. Added in knee flexion to improve swing phase of gait and picking up legs for functional exercises. Will continue to progress as tolerated.     Maryann Is progressing well towards her goals.   Pt prognosis is Good.     Pt will continue to benefit from skilled outpatient physical therapy to address the deficits listed in the problem list box on initial evaluation, provide pt/family education and to maximize pt's level of independence in the home and community environment.     Pt's spiritual, cultural and educational needs considered and pt agreeable to plan of care and goals.     Anticipated barriers to physical therapy: disease progression, scheduling    Goals: as of 11/17/22  Short Term Goals: 4 weeks   1. Pt will perform all bed mobility tasks with CGA or better so that they can improve trunk control and decrease caregiver burden. - improving  2. Patient will complete stand pivot transfer with CGA and appropriate assistive device with good body mechanics to promote safe functional transfers. - MET 10/14/22  3. Patient will transfer sit to/from standing with CGA and appropriate assistive device with good body mechanics to promote safe functional transfers. - MET 10/14/22  4. Patient to tolerate performance of 5 times sit to stand  to demonstrate improvement in lower extremity endurance. - MET 10/14/22  5. Patient to tolerate MCTSIB testing/completion of testing to track balance and improvements in static balance. - MET 10/14/22  6. Patient to perform Timed Up and Go at 47 seconds demonstrating improvement in functional mobility. - MET 10/14/22     Long Term Goals: 8 weeks   1. Pt will perform all bed mobility tasks with SPV or better so that they can improve trunk control and decrease caregiver burden. - improving  2. Patient will be SBA with home exercise program (HEP) to promote continued rehabilitation following discharge.  3. Patient will complete stand pivot transfer with SBA and appropriate assistive device with good body mechanics to promote safe functional transfers. - improving  4. Patient will transfer sit to/from standing with SBA and appropriate assistive device with good body mechanics to promote safe functional transfers. - improving  6. Patient to perform Timed Up and Go at 41 seconds demonstrating improvement in functional mobility. - MET 11/17/22  7. NEW: Patient to perform 5xSTS at 22 seconds demonstrating improvement in functional strength. - improving   8. NEW: Patient to perform gait speed at 0.4 m/s demonstrating improved safety with household ambulation.    - improving   9. NEW: Patient to perform MCTSIB at 15+ seconds for all 4 conditions to improve safety with static standing for in home tasks.  - improving   10. NEW: Pt to perform TUG score in < / = 25 seconds in order to improve functional mobility and safety.  11. NEW: Pt to improve 6 minute walk test distance by > / = meters in order to improve functional mobility and independence.        PLAN   Current Plan of care Certification: 12/30/22 EXTEND to 3/29/23     Current POC: Continue at 1x/wk x 12 weeks with focus on Neuro-Reeducation, There-Ex, There-Act, Patient Education, Gait Training, Self Care. Continue to progress balance and strengthening while monitoring  fatigue.     Continue B LE strength and static/dynamic balance challenges as tolerated.     Jaquelin Ag, PT, DPT  02/01/2023

## 2023-02-09 ENCOUNTER — CLINICAL SUPPORT (OUTPATIENT)
Dept: REHABILITATION | Facility: HOSPITAL | Age: 73
End: 2023-02-09
Payer: MEDICARE

## 2023-02-09 DIAGNOSIS — R26.9 ABNORMALITY OF GAIT: ICD-10-CM

## 2023-02-09 DIAGNOSIS — R26.89 BALANCE PROBLEM: Primary | ICD-10-CM

## 2023-02-09 DIAGNOSIS — R53.1 DECREASED STRENGTH, ENDURANCE, AND MOBILITY: ICD-10-CM

## 2023-02-09 DIAGNOSIS — Z74.09 DECREASED STRENGTH, ENDURANCE, AND MOBILITY: ICD-10-CM

## 2023-02-09 DIAGNOSIS — R68.89 DECREASED STRENGTH, ENDURANCE, AND MOBILITY: ICD-10-CM

## 2023-02-09 PROCEDURE — 97110 THERAPEUTIC EXERCISES: CPT | Mod: PN

## 2023-02-09 NOTE — PROGRESS NOTES
"[facility]   Physical Therapy Treatment Note    Name: Marie Lejeune  Clinic Number: [id number]    Therapy Diagnosis:   Encounter Diagnoses   Name Primary?    Balance problem Yes    Decreased strength, endurance, and mobility     Abnormality of gait        Physician: Veena Joshua MD    Visit Date: 2/9/2023    Physician Orders: PT Eval and Treat MS  Medical Diagnosis from Referral: MS (multiple sclerosis) [G35], Gait disturbance [R26.9]  Evaluation Date: 9/9/2022  Authorization Period Expiration: 8/22/23  Plan of Care Expiration: 12/30/22 EXTEND to 3/29/23  Visit # / Visits authorized: 5/20 (20 total)   PN: 1/29/23    Time In: 11:14 AM  Time Out: 11:55 AM  Total Billable Time: 41 minutes    Precautions: Standard, Fall, and Multiple Sclerosis - do not fatigue     SUBJECTIVE     Pt reports: She is doing well today, compliant with exercises at home. No falls to report.     She was compliant with home exercise program.  Response to previous treatment: no adverse response  Functional change: ongoing     Pain: 0/10  Location: NA    Fatigue: 3/10     OBJECTIVE     Objective Measures updated at progress report unless specified.     Treatment     Maryann received the treatments listed below:      therapeutic exercises to develop strength, endurance, ROM, posture, and core stabilization for 41 minutes including:    Nustep 8 min - level 2.0 - 4/10 fatigue - BUE/BLE     At parallel bars: 2# weight on B LE   Hip Abduction 2x10   Hip Extension 2x10   Knee flexion x10 B 6/10 and 7/10 fatigue level   Mini squats, bilateral upper extremity support, 2x10                Step up 4" 10x2 - 7/10, 7/10  - 2# ankle weights (7/10 leading with R side;   NBOS, foam, eyes open  2x30 seconds              WBOS, foam, eyes open 2x30 seconds               WBOS, foam, eyes closed 2x20"       Patient Education and Home Exercises     Home Exercises Provided and Patient Education Provided     Education provided:   - role " of PT, plan of care (POC), scheduling     Written Home Exercises Provided: Patient instructed to cont prior HEP.  Exercises were reviewed and Maryann was able to demonstrate them prior to the end of the session.  Maryann demonstrated fair  understanding of the education provided.      See EMR under Patient Instructions for exercises provided at future visits and at previous bout of therapy visits.    ASSESSMENT     Maryann tolerated session well with no adverse response noted. Pt with appropriate challenge noted to all prescribed activities. Pt required multiple seated rest breaks today throughout activities to prevent over fatigue. Pt with most difficulty balancing with vision eliminated activities. VC given during step ups to improve B LE clearance, knee flexion and avoid circumduction of B LE. Pt remains appropriate for therapy at this time.     Maryann Is progressing well towards her goals.   Pt prognosis is Good.     Pt will continue to benefit from skilled outpatient physical therapy to address the deficits listed in the problem list box on initial evaluation, provide pt/family education and to maximize pt's level of independence in the home and community environment.     Pt's spiritual, cultural and educational needs considered and pt agreeable to plan of care and goals.     Anticipated barriers to physical therapy: disease progression, scheduling    Goals: as of 11/17/22  Short Term Goals: 4 weeks   1. Pt will perform all bed mobility tasks with CGA or better so that they can improve trunk control and decrease caregiver burden. - improving  2. Patient will complete stand pivot transfer with CGA and appropriate assistive device with good body mechanics to promote safe functional transfers. - MET 10/14/22  3. Patient will transfer sit to/from standing with CGA and appropriate assistive device with good body mechanics to promote safe functional transfers. - MET 10/14/22  4. Patient to tolerate performance of 5 times  sit to stand to demonstrate improvement in lower extremity endurance. - MET 10/14/22  5. Patient to tolerate MCTSIB testing/completion of testing to track balance and improvements in static balance. - MET 10/14/22  6. Patient to perform Timed Up and Go at 47 seconds demonstrating improvement in functional mobility. - MET 10/14/22     Long Term Goals: 8 weeks   1. Pt will perform all bed mobility tasks with SPV or better so that they can improve trunk control and decrease caregiver burden. - improving  2. Patient will be SBA with home exercise program (HEP) to promote continued rehabilitation following discharge.  3. Patient will complete stand pivot transfer with SBA and appropriate assistive device with good body mechanics to promote safe functional transfers. - improving  4. Patient will transfer sit to/from standing with SBA and appropriate assistive device with good body mechanics to promote safe functional transfers. - improving  6. Patient to perform Timed Up and Go at 41 seconds demonstrating improvement in functional mobility. - MET 11/17/22  7. NEW: Patient to perform 5xSTS at 22 seconds demonstrating improvement in functional strength. - improving   8. NEW: Patient to perform gait speed at 0.4 m/s demonstrating improved safety with household ambulation.    - improving   9. NEW: Patient to perform MCTSIB at 15+ seconds for all 4 conditions to improve safety with static standing for in home tasks.  - improving   10. NEW: Pt to perform TUG score in < / = 25 seconds in order to improve functional mobility and safety.  11. NEW: Pt to improve 6 minute walk test distance by > / = meters in order to improve functional mobility and independence.        PLAN   Current Plan of care Certification: 12/30/22 EXTEND to 3/29/23     Current POC: Continue at 1x/wk x 12 weeks with focus on Neuro-Reeducation, There-Ex, There-Act, Patient Education, Gait Training, Self Care. Continue to progress balance and strengthening while  monitoring fatigue.     Continue B LE strength and static/dynamic balance challenges as tolerated.     Elizabeth Peterson, PT, DPT  02/14/2023

## 2023-02-16 ENCOUNTER — CLINICAL SUPPORT (OUTPATIENT)
Dept: REHABILITATION | Facility: HOSPITAL | Age: 73
End: 2023-02-16
Payer: MEDICARE

## 2023-02-16 DIAGNOSIS — R26.89 BALANCE PROBLEM: Primary | ICD-10-CM

## 2023-02-16 DIAGNOSIS — Z74.09 DECREASED STRENGTH, ENDURANCE, AND MOBILITY: ICD-10-CM

## 2023-02-16 DIAGNOSIS — R68.89 DECREASED STRENGTH, ENDURANCE, AND MOBILITY: ICD-10-CM

## 2023-02-16 DIAGNOSIS — R26.9 ABNORMALITY OF GAIT: ICD-10-CM

## 2023-02-16 DIAGNOSIS — R53.1 DECREASED STRENGTH, ENDURANCE, AND MOBILITY: ICD-10-CM

## 2023-02-16 PROCEDURE — 97110 THERAPEUTIC EXERCISES: CPT | Mod: PN

## 2023-02-16 NOTE — PROGRESS NOTES
"[facility]   Physical Therapy Treatment Note    Name: Marie Lejeune  Clinic Number: [id number]    Therapy Diagnosis:   Encounter Diagnoses   Name Primary?    Balance problem Yes    Decreased strength, endurance, and mobility     Abnormality of gait        Physician: Veena Joshua MD    Visit Date: 2/16/2023    Physician Orders: PT Eval and Treat MS  Medical Diagnosis from Referral: MS (multiple sclerosis) [G35], Gait disturbance [R26.9]  Evaluation Date: 9/9/2022  Authorization Period Expiration: 8/22/23  Plan of Care Expiration: 12/30/22 EXTEND to 3/29/23  Visit # / Visits authorized: 6/20 (21 total)   PN: 1/29/23    Time In: 11:15 AM  Time Out: 11:55 AM  Total Billable Time: 40 minutes    Precautions: Standard, Fall, and Multiple Sclerosis - do not fatigue     SUBJECTIVE     Pt reports: She is doing well today, compliant with exercises at home. No falls to report.     She was compliant with home exercise program.  Response to previous treatment: no adverse response  Functional change: ongoing     Pain: 0/10  Location: NA    Fatigue: 3/10     OBJECTIVE     Objective Measures updated at progress report unless specified.     Treatment     Maryann received the treatments listed below:      therapeutic exercises to develop strength, endurance, ROM, posture, and core stabilization for 40 minutes including:    Nustep 8 min - level 2.0 - 4/10 fatigue - BUE/BLE     At parallel bars: 2# weight on B LE   Hip Abduction 2x10   Hip Extension 2x10   Knee flexion x10 B 6/10 and 7/10 fatigue level   Mini squats, bilateral upper extremity support, 2x10                Step up 4" 10x2 - 7/10, 7/10  - 2# ankle weights     -cones for vc to prevent circumduction  NBOS, foam, eyes open  2x30 seconds              WBOS, foam, eyes open 2x30 seconds               WBOS, foam, eyes closed 2x20"     Patient Education and Home Exercises     Home Exercises Provided and Patient Education Provided     Education " provided:   - role of PT, plan of care (POC), scheduling     Written Home Exercises Provided: Patient instructed to cont prior HEP.  Exercises were reviewed and Maryann was able to demonstrate them prior to the end of the session.  Maryann demonstrated fair  understanding of the education provided.      See EMR under Patient Instructions for exercises provided at future visits and at previous bout of therapy visits.    ASSESSMENT     Maryann tolerated session well with no adverse response noted. Pt with appropriate challenge noted to all prescribed activities. Pt required multiple seated rest breaks today throughout activities to prevent over fatigue. Pt with most difficulty balancing with vision eliminated activities. Cones placed laterally during step ups for tactile/visual cue to improve B LE clearance, knee flexion and avoid circumduction of B LE. Pt cued to avoid knocking over cones, decreased circumduction noted particularly to L LE. Pt remains appropriate for therapy at this time.     Maryann Is progressing well towards her goals.   Pt prognosis is Good.     Pt will continue to benefit from skilled outpatient physical therapy to address the deficits listed in the problem list box on initial evaluation, provide pt/family education and to maximize pt's level of independence in the home and community environment.     Pt's spiritual, cultural and educational needs considered and pt agreeable to plan of care and goals.     Anticipated barriers to physical therapy: disease progression, scheduling    Goals: as of 11/17/22  Short Term Goals: 4 weeks   1. Pt will perform all bed mobility tasks with CGA or better so that they can improve trunk control and decrease caregiver burden. - improving  2. Patient will complete stand pivot transfer with CGA and appropriate assistive device with good body mechanics to promote safe functional transfers. - MET 10/14/22  3. Patient will transfer sit to/from standing with CGA and  appropriate assistive device with good body mechanics to promote safe functional transfers. - MET 10/14/22  4. Patient to tolerate performance of 5 times sit to stand to demonstrate improvement in lower extremity endurance. - MET 10/14/22  5. Patient to tolerate MCTSIB testing/completion of testing to track balance and improvements in static balance. - MET 10/14/22  6. Patient to perform Timed Up and Go at 47 seconds demonstrating improvement in functional mobility. - MET 10/14/22     Long Term Goals: 8 weeks   1. Pt will perform all bed mobility tasks with SPV or better so that they can improve trunk control and decrease caregiver burden. - improving  2. Patient will be SBA with home exercise program (HEP) to promote continued rehabilitation following discharge.  3. Patient will complete stand pivot transfer with SBA and appropriate assistive device with good body mechanics to promote safe functional transfers. - improving  4. Patient will transfer sit to/from standing with SBA and appropriate assistive device with good body mechanics to promote safe functional transfers. - improving  6. Patient to perform Timed Up and Go at 41 seconds demonstrating improvement in functional mobility. - MET 11/17/22  7. NEW: Patient to perform 5xSTS at 22 seconds demonstrating improvement in functional strength. - improving   8. NEW: Patient to perform gait speed at 0.4 m/s demonstrating improved safety with household ambulation.    - improving   9. NEW: Patient to perform MCTSIB at 15+ seconds for all 4 conditions to improve safety with static standing for in home tasks.  - improving   10. NEW: Pt to perform TUG score in < / = 25 seconds in order to improve functional mobility and safety.  11. NEW: Pt to improve 6 minute walk test distance by > / = meters in order to improve functional mobility and independence.        PLAN   Current Plan of care Certification: 12/30/22 EXTEND to 3/29/23     Current POC: Continue at 1x/wk x 12  weeks with focus on Neuro-Reeducation, There-Ex, There-Act, Patient Education, Gait Training, Self Care. Continue to progress balance and strengthening while monitoring fatigue.     Continue B LE strength and static/dynamic balance challenges as tolerated.     Elizabeth Peterson, PT, DPT  02/22/2023

## 2023-02-20 ENCOUNTER — PATIENT MESSAGE (OUTPATIENT)
Dept: PSYCHIATRY | Facility: CLINIC | Age: 73
End: 2023-02-20
Payer: MEDICARE

## 2023-02-20 DIAGNOSIS — G47.33 OSA (OBSTRUCTIVE SLEEP APNEA): ICD-10-CM

## 2023-02-20 DIAGNOSIS — R53.82 CHRONIC FATIGUE: ICD-10-CM

## 2023-02-20 DIAGNOSIS — G35 MULTIPLE SCLEROSIS: ICD-10-CM

## 2023-02-21 RX ORDER — MODAFINIL 100 MG/1
TABLET ORAL
Qty: 90 TABLET | Refills: 0 | Status: SHIPPED | OUTPATIENT
Start: 2023-02-21 | End: 2023-04-19 | Stop reason: SDUPTHER

## 2023-02-23 ENCOUNTER — CLINICAL SUPPORT (OUTPATIENT)
Dept: REHABILITATION | Facility: HOSPITAL | Age: 73
End: 2023-02-23
Payer: MEDICARE

## 2023-02-23 DIAGNOSIS — R26.89 BALANCE PROBLEM: Primary | ICD-10-CM

## 2023-02-23 DIAGNOSIS — R68.89 DECREASED STRENGTH, ENDURANCE, AND MOBILITY: ICD-10-CM

## 2023-02-23 DIAGNOSIS — R26.9 ABNORMALITY OF GAIT: ICD-10-CM

## 2023-02-23 DIAGNOSIS — Z74.09 DECREASED STRENGTH, ENDURANCE, AND MOBILITY: ICD-10-CM

## 2023-02-23 DIAGNOSIS — R53.1 DECREASED STRENGTH, ENDURANCE, AND MOBILITY: ICD-10-CM

## 2023-02-23 PROCEDURE — 97110 THERAPEUTIC EXERCISES: CPT | Mod: PN

## 2023-02-23 NOTE — PROGRESS NOTES
"[facility]   Physical Therapy Treatment Note    Name: Marie Lejeune  Clinic Number: [id number]    Therapy Diagnosis:   Encounter Diagnoses   Name Primary?    Balance problem Yes    Decreased strength, endurance, and mobility     Abnormality of gait          Physician: Veena Joshua MD    Visit Date: 2/23/2023    Physician Orders: PT Eval and Treat MS  Medical Diagnosis from Referral: MS (multiple sclerosis) [G35], Gait disturbance [R26.9]  Evaluation Date: 9/9/2022  Authorization Period Expiration: 8/22/23  Plan of Care Expiration: 12/30/22 EXTEND to 3/29/23  Visit # / Visits authorized: 7/20 (22 total)   PN: 1/29/23    Time In: 11:15 AM  Time Out: 11:55 AM  Total Billable Time: 40 minutes    Precautions: Standard, Fall, and Multiple Sclerosis - do not fatigue     SUBJECTIVE     Pt reports: She is doing well today, compliant with exercises at home. No falls to report.     She was compliant with home exercise program.  Response to previous treatment: no adverse response  Functional change: ongoing     Pain: 0/10  Location: NA    Fatigue: 3/10     OBJECTIVE     Objective Measures updated at progress report unless specified.     Treatment     Maryann received the treatments listed below:      therapeutic exercises to develop strength, endurance, ROM, posture, and core stabilization for 40 minutes including:    Nustep 8 min - level 2.0 - 4/10 fatigue - BUE/BLE     At parallel bars: 2# weight on B LE   Hip Abduction 2x10   Hip Extension 2x10   Knee flexion x10 B 6/10 and 7/10 fatigue level   Mini squats, bilateral upper extremity support, 2x10                Step up 4" 10x2 - 7/10, 7/10  - 2# ankle weights     -cones for vc to prevent circumduction  NBOS, foam, eyes open  2x30 seconds              WBOS, foam, eyes open 2x30 seconds               WBOS, foam, eyes closed 2x20"     Patient Education and Home Exercises     Home Exercises Provided and Patient Education Provided     Education " provided:   - role of PT, plan of care (POC), scheduling     Written Home Exercises Provided: Patient instructed to cont prior HEP.  Exercises were reviewed and Maryann was able to demonstrate them prior to the end of the session.  Maryann demonstrated fair  understanding of the education provided.      See EMR under Patient Instructions for exercises provided at future visits and at previous bout of therapy visits.    ASSESSMENT     Maryann tolerated session well with no adverse response noted. Pt with appropriate challenge noted to all prescribed activities. Pt required multiple seated rest breaks today throughout activities to prevent over fatigue. Pt with most difficulty balancing with vision eliminated activities. Cones placed laterally during step ups for tactile/visual cue to improve B LE clearance, knee flexion and avoid circumduction of B LE. Pt cued to avoid knocking over cones, decreased circumduction noted particularly to L LE. Pt remains appropriate for therapy at this time.     Maryann Is progressing well towards her goals.   Pt prognosis is Good.     Pt will continue to benefit from skilled outpatient physical therapy to address the deficits listed in the problem list box on initial evaluation, provide pt/family education and to maximize pt's level of independence in the home and community environment.     Pt's spiritual, cultural and educational needs considered and pt agreeable to plan of care and goals.     Anticipated barriers to physical therapy: disease progression, scheduling    Goals: as of 11/17/22  Short Term Goals: 4 weeks   1. Pt will perform all bed mobility tasks with CGA or better so that they can improve trunk control and decrease caregiver burden. - improving  2. Patient will complete stand pivot transfer with CGA and appropriate assistive device with good body mechanics to promote safe functional transfers. - MET 10/14/22  3. Patient will transfer sit to/from standing with CGA and  appropriate assistive device with good body mechanics to promote safe functional transfers. - MET 10/14/22  4. Patient to tolerate performance of 5 times sit to stand to demonstrate improvement in lower extremity endurance. - MET 10/14/22  5. Patient to tolerate MCTSIB testing/completion of testing to track balance and improvements in static balance. - MET 10/14/22  6. Patient to perform Timed Up and Go at 47 seconds demonstrating improvement in functional mobility. - MET 10/14/22     Long Term Goals: 8 weeks   1. Pt will perform all bed mobility tasks with SPV or better so that they can improve trunk control and decrease caregiver burden. - improving  2. Patient will be SBA with home exercise program (HEP) to promote continued rehabilitation following discharge.  3. Patient will complete stand pivot transfer with SBA and appropriate assistive device with good body mechanics to promote safe functional transfers. - improving  4. Patient will transfer sit to/from standing with SBA and appropriate assistive device with good body mechanics to promote safe functional transfers. - improving  6. Patient to perform Timed Up and Go at 41 seconds demonstrating improvement in functional mobility. - MET 11/17/22  7. NEW: Patient to perform 5xSTS at 22 seconds demonstrating improvement in functional strength. - improving   8. NEW: Patient to perform gait speed at 0.4 m/s demonstrating improved safety with household ambulation.    - improving   9. NEW: Patient to perform MCTSIB at 15+ seconds for all 4 conditions to improve safety with static standing for in home tasks.  - improving   10. NEW: Pt to perform TUG score in < / = 25 seconds in order to improve functional mobility and safety.  11. NEW: Pt to improve 6 minute walk test distance by > / = meters in order to improve functional mobility and independence.        PLAN   Current Plan of care Certification: 12/30/22 EXTEND to 3/29/23     Current POC: Continue at 1x/wk x 12  weeks with focus on Neuro-Reeducation, There-Ex, There-Act, Patient Education, Gait Training, Self Care. Continue to progress balance and strengthening while monitoring fatigue.     Continue B LE strength and static/dynamic balance challenges as tolerated.     Elizabeth Peterson, PT, DPT  02/27/2023

## 2023-03-09 ENCOUNTER — CLINICAL SUPPORT (OUTPATIENT)
Dept: REHABILITATION | Facility: HOSPITAL | Age: 73
End: 2023-03-09
Payer: MEDICARE

## 2023-03-09 DIAGNOSIS — R26.9 ABNORMALITY OF GAIT: ICD-10-CM

## 2023-03-09 DIAGNOSIS — R68.89 DECREASED STRENGTH, ENDURANCE, AND MOBILITY: ICD-10-CM

## 2023-03-09 DIAGNOSIS — R26.89 BALANCE PROBLEM: Primary | ICD-10-CM

## 2023-03-09 DIAGNOSIS — R53.1 DECREASED STRENGTH, ENDURANCE, AND MOBILITY: ICD-10-CM

## 2023-03-09 DIAGNOSIS — Z74.09 DECREASED STRENGTH, ENDURANCE, AND MOBILITY: ICD-10-CM

## 2023-03-09 PROCEDURE — 97110 THERAPEUTIC EXERCISES: CPT | Mod: PN

## 2023-03-09 NOTE — PROGRESS NOTES
"[facility]   Physical Therapy Treatment Note    Name: Marie Lejeune  Clinic Number: [id number]    Therapy Diagnosis:   Encounter Diagnoses   Name Primary?    Balance problem Yes    Decreased strength, endurance, and mobility     Abnormality of gait        Physician: Veena Joshua MD    Visit Date: 3/9/2023    Physician Orders: PT Eval and Treat MS  Medical Diagnosis from Referral: MS (multiple sclerosis) [G35], Gait disturbance [R26.9]  Evaluation Date: 9/9/2022  Authorization Period Expiration: 8/22/23  Plan of Care Expiration: 12/30/22 EXTEND to 3/29/23  Visit # / Visits authorized: 8/20 (22 total)   PN: 1/29/23    Time In: 10:35 AM  Time Out: 11:15 AM  Total Billable Time: 40 minutes    Precautions: Standard, Fall, and Multiple Sclerosis - do not fatigue     SUBJECTIVE     Pt reports: She is doing well today, compliant with exercises at home. No falls to report.     She was compliant with home exercise program.  Response to previous treatment: no adverse response  Functional change: ongoing     Pain: 0/10  Location: NA    Fatigue: 3/10     OBJECTIVE     Objective Measures updated at progress report unless specified.     Treatment     Maryann received the treatments listed below:      therapeutic exercises to develop strength, endurance, ROM, posture, and core stabilization for 40 minutes including:    Nustep 10 min - level 2.0 - 4/10 fatigue - BUE/BLE     At parallel bars: 2# weight on B LE   Hip Abduction 2x10   Hip Extension 2x10   Knee flexion x10 B 6/10 and 7/10 fatigue level   Mini squats, bilateral upper extremity support, 2x10                Step up 4" 10x2 - 7/10, 7/10  - 2# ankle weights     -cones for vc to prevent circumduction  NBOS, foam, eyes open  2x30 seconds              WBOS, foam, eyes open 2x30 seconds               WBOS, foam, eyes closed 2x20"     Patient Education and Home Exercises     Home Exercises Provided and Patient Education Provided     Education " provided:   - role of PT, plan of care (POC), scheduling     Written Home Exercises Provided: Patient instructed to cont prior HEP.  Exercises were reviewed and Maryann was able to demonstrate them prior to the end of the session.  Maryann demonstrated fair  understanding of the education provided.      See EMR under Patient Instructions for exercises provided at future visits and at previous bout of therapy visits.    ASSESSMENT     Maryann tolerated session well with no adverse response noted. Pt with appropriate challenge noted to all prescribed activities. Pt required multiple seated rest breaks today throughout activities to prevent over fatigue. Pt with most difficulty balancing with vision eliminated activities. Continuation of cones placed laterally during step ups for tactile/visual cue to improve B LE clearance, knee flexion and avoid circumduction of B LE. Pt cued to avoid knocking over cones, decreased circumduction noted particularly to L LE. Pt remains appropriate for therapy at this time.     Maryann Is progressing well towards her goals.   Pt prognosis is Good.     Pt will continue to benefit from skilled outpatient physical therapy to address the deficits listed in the problem list box on initial evaluation, provide pt/family education and to maximize pt's level of independence in the home and community environment.     Pt's spiritual, cultural and educational needs considered and pt agreeable to plan of care and goals.     Anticipated barriers to physical therapy: disease progression, scheduling    Goals: as of 11/17/22  Short Term Goals: 4 weeks   1. Pt will perform all bed mobility tasks with CGA or better so that they can improve trunk control and decrease caregiver burden. - improving  2. Patient will complete stand pivot transfer with CGA and appropriate assistive device with good body mechanics to promote safe functional transfers. - MET 10/14/22  3. Patient will transfer sit to/from standing with  CGA and appropriate assistive device with good body mechanics to promote safe functional transfers. - MET 10/14/22  4. Patient to tolerate performance of 5 times sit to stand to demonstrate improvement in lower extremity endurance. - MET 10/14/22  5. Patient to tolerate MCTSIB testing/completion of testing to track balance and improvements in static balance. - MET 10/14/22  6. Patient to perform Timed Up and Go at 47 seconds demonstrating improvement in functional mobility. - MET 10/14/22     Long Term Goals: 8 weeks   1. Pt will perform all bed mobility tasks with SPV or better so that they can improve trunk control and decrease caregiver burden. - improving  2. Patient will be SBA with home exercise program (HEP) to promote continued rehabilitation following discharge.  3. Patient will complete stand pivot transfer with SBA and appropriate assistive device with good body mechanics to promote safe functional transfers. - improving  4. Patient will transfer sit to/from standing with SBA and appropriate assistive device with good body mechanics to promote safe functional transfers. - improving  6. Patient to perform Timed Up and Go at 41 seconds demonstrating improvement in functional mobility. - MET 11/17/22  7. NEW: Patient to perform 5xSTS at 22 seconds demonstrating improvement in functional strength. - improving   8. NEW: Patient to perform gait speed at 0.4 m/s demonstrating improved safety with household ambulation.    - improving   9. NEW: Patient to perform MCTSIB at 15+ seconds for all 4 conditions to improve safety with static standing for in home tasks.  - improving   10. NEW: Pt to perform TUG score in < / = 25 seconds in order to improve functional mobility and safety.  11. NEW: Pt to improve 6 minute walk test distance by > / = meters in order to improve functional mobility and independence.        PLAN   Current Plan of care Certification: 12/30/22 EXTEND to 3/29/23     Current POC: Continue at 1x/wk  x 12 weeks with focus on Neuro-Reeducation, There-Ex, There-Act, Patient Education, Gait Training, Self Care. Continue to progress balance and strengthening while monitoring fatigue.     Continue B LE strength and static/dynamic balance challenges as tolerated.     Elizabeth Peterson, PT, DPT  03/20/2023

## 2023-03-16 ENCOUNTER — CLINICAL SUPPORT (OUTPATIENT)
Dept: REHABILITATION | Facility: HOSPITAL | Age: 73
End: 2023-03-16
Payer: MEDICARE

## 2023-03-16 DIAGNOSIS — R26.89 BALANCE PROBLEM: Primary | ICD-10-CM

## 2023-03-16 DIAGNOSIS — R68.89 DECREASED STRENGTH, ENDURANCE, AND MOBILITY: ICD-10-CM

## 2023-03-16 DIAGNOSIS — R26.9 ABNORMALITY OF GAIT: ICD-10-CM

## 2023-03-16 DIAGNOSIS — R53.1 DECREASED STRENGTH, ENDURANCE, AND MOBILITY: ICD-10-CM

## 2023-03-16 DIAGNOSIS — Z74.09 DECREASED STRENGTH, ENDURANCE, AND MOBILITY: ICD-10-CM

## 2023-03-16 PROCEDURE — 97110 THERAPEUTIC EXERCISES: CPT | Mod: PN

## 2023-03-23 ENCOUNTER — CLINICAL SUPPORT (OUTPATIENT)
Dept: REHABILITATION | Facility: HOSPITAL | Age: 73
End: 2023-03-23
Payer: MEDICARE

## 2023-03-23 DIAGNOSIS — R26.9 ABNORMALITY OF GAIT: ICD-10-CM

## 2023-03-23 DIAGNOSIS — R53.1 DECREASED STRENGTH, ENDURANCE, AND MOBILITY: ICD-10-CM

## 2023-03-23 DIAGNOSIS — R26.89 BALANCE PROBLEM: Primary | ICD-10-CM

## 2023-03-23 DIAGNOSIS — R68.89 DECREASED STRENGTH, ENDURANCE, AND MOBILITY: ICD-10-CM

## 2023-03-23 DIAGNOSIS — Z74.09 DECREASED STRENGTH, ENDURANCE, AND MOBILITY: ICD-10-CM

## 2023-03-23 PROCEDURE — 97110 THERAPEUTIC EXERCISES: CPT | Mod: PN

## 2023-03-31 ENCOUNTER — PATIENT MESSAGE (OUTPATIENT)
Dept: REHABILITATION | Facility: HOSPITAL | Age: 73
End: 2023-03-31
Payer: MEDICARE

## 2023-03-31 NOTE — PROGRESS NOTES
"[facility]   Physical Therapy Treatment Note    Name: Marie Lejeune  Clinic Number: [id number]    Therapy Diagnosis:   Encounter Diagnoses   Name Primary?    Balance problem Yes    Decreased strength, endurance, and mobility     Abnormality of gait        Physician: Veena Joshua MD    Visit Date: 3/16/2023    Physician Orders: PT Eval and Treat MS  Medical Diagnosis from Referral: MS (multiple sclerosis) [G35], Gait disturbance [R26.9]  Evaluation Date: 9/9/2022  Authorization Period Expiration: 8/22/23  Plan of Care Expiration: 12/30/22 EXTEND to 3/29/23  Visit # / Visits authorized: 9/20 (23 total)   PN: 1/29/23    Time In: 10:35 AM  Time Out: 11:15 AM  Total Billable Time: 40 minutes    Precautions: Standard, Fall, and Multiple Sclerosis - do not fatigue     SUBJECTIVE     Pt reports: She is doing well today, compliant with exercises at home. No falls to report.     She was compliant with home exercise program.  Response to previous treatment: no adverse response  Functional change: ongoing     Pain: 0/10  Location: NA    Fatigue: 3/10     OBJECTIVE     Objective Measures updated at progress report unless specified.     Treatment     Maryann received the treatments listed below:      therapeutic exercises to develop strength, endurance, ROM, posture, and core stabilization for 40 minutes including:    Nustep 10 min - level 2.0 - 4/10 fatigue - BUE/BLE     At parallel bars: 2# weight on B LE   Hip Abduction 2x10   Hip Extension 2x10   Knee flexion x10 B 6/10 and 7/10 fatigue level   Mini squats, bilateral upper extremity support, 2x10                Step up 4" 10x2 - 7/10, 7/10  - 2# ankle weights     -cones for vc to prevent circumduction  NBOS, foam, eyes open  2x30 seconds              WBOS, foam, eyes open 2x30 seconds               WBOS, foam, eyes closed 2x20"     Patient Education and Home Exercises     Home Exercises Provided and Patient Education Provided     Education " provided:   - role of PT, plan of care (POC), scheduling     Written Home Exercises Provided: Patient instructed to cont prior HEP.  Exercises were reviewed and Maryann was able to demonstrate them prior to the end of the session.  Maryann demonstrated fair  understanding of the education provided.      See EMR under Patient Instructions for exercises provided at future visits and at previous bout of therapy visits.    ASSESSMENT     Maryann tolerated session well with no adverse response noted. Pt with appropriate challenge noted to all prescribed activities. Pt required multiple seated rest breaks today throughout activities to prevent over fatigue. Pt with most difficulty balancing with vision eliminated activities. Continuation of cones placed laterally during step ups for tactile/visual cue to improve B LE clearance, knee flexion and avoid circumduction of B LE. Pt cued to avoid knocking over cones, decreased circumduction noted particularly to L LE. Pt remains appropriate for therapy at this time.     Maryann Is progressing well towards her goals.   Pt prognosis is Good.     Pt will continue to benefit from skilled outpatient physical therapy to address the deficits listed in the problem list box on initial evaluation, provide pt/family education and to maximize pt's level of independence in the home and community environment.     Pt's spiritual, cultural and educational needs considered and pt agreeable to plan of care and goals.     Anticipated barriers to physical therapy: disease progression, scheduling    Goals: as of 11/17/22  Short Term Goals: 4 weeks   1. Pt will perform all bed mobility tasks with CGA or better so that they can improve trunk control and decrease caregiver burden. - improving  2. Patient will complete stand pivot transfer with CGA and appropriate assistive device with good body mechanics to promote safe functional transfers. - MET 10/14/22  3. Patient will transfer sit to/from standing with  CGA and appropriate assistive device with good body mechanics to promote safe functional transfers. - MET 10/14/22  4. Patient to tolerate performance of 5 times sit to stand to demonstrate improvement in lower extremity endurance. - MET 10/14/22  5. Patient to tolerate MCTSIB testing/completion of testing to track balance and improvements in static balance. - MET 10/14/22  6. Patient to perform Timed Up and Go at 47 seconds demonstrating improvement in functional mobility. - MET 10/14/22     Long Term Goals: 8 weeks   1. Pt will perform all bed mobility tasks with SPV or better so that they can improve trunk control and decrease caregiver burden. - improving  2. Patient will be SBA with home exercise program (HEP) to promote continued rehabilitation following discharge.  3. Patient will complete stand pivot transfer with SBA and appropriate assistive device with good body mechanics to promote safe functional transfers. - improving  4. Patient will transfer sit to/from standing with SBA and appropriate assistive device with good body mechanics to promote safe functional transfers. - improving  6. Patient to perform Timed Up and Go at 41 seconds demonstrating improvement in functional mobility. - MET 11/17/22  7. NEW: Patient to perform 5xSTS at 22 seconds demonstrating improvement in functional strength. - improving   8. NEW: Patient to perform gait speed at 0.4 m/s demonstrating improved safety with household ambulation.    - improving   9. NEW: Patient to perform MCTSIB at 15+ seconds for all 4 conditions to improve safety with static standing for in home tasks.  - improving   10. NEW: Pt to perform TUG score in < / = 25 seconds in order to improve functional mobility and safety.  11. NEW: Pt to improve 6 minute walk test distance by > / = meters in order to improve functional mobility and independence.        PLAN   Current Plan of care Certification: 12/30/22 EXTEND to 3/29/23     Current POC: Continue at 1x/wk  x 12 weeks with focus on Neuro-Reeducation, There-Ex, There-Act, Patient Education, Gait Training, Self Care. Continue to progress balance and strengthening while monitoring fatigue.     Continue B LE strength and static/dynamic balance challenges as tolerated.     Elizabeth Peterson, PT, DPT  03/31/2023

## 2023-04-01 NOTE — PROGRESS NOTES
"[facility]   Physical Therapy Treatment Note    Name: Marie Lejeune  Clinic Number: [id number]    Therapy Diagnosis:   Encounter Diagnoses   Name Primary?    Balance problem Yes    Decreased strength, endurance, and mobility     Abnormality of gait        Physician: Veena Joshua MD    Visit Date: 3/23/2023    Physician Orders: PT Eval and Treat MS  Medical Diagnosis from Referral: MS (multiple sclerosis) [G35], Gait disturbance [R26.9]  Evaluation Date: 9/9/2022  Authorization Period Expiration: 8/22/23  Plan of Care Expiration: 12/30/22 EXTEND to 3/29/23  Visit # / Visits authorized: 10/20 (24 total)   PN: 1/29/23    Time In: 10:35 AM  Time Out: 11:15 AM  Total Billable Time: 40 minutes    Precautions: Standard, Fall, and Multiple Sclerosis - do not fatigue     SUBJECTIVE     Pt reports: She is doing well today, compliant with exercises at home. No falls to report.     She was compliant with home exercise program.  Response to previous treatment: no adverse response  Functional change: ongoing     Pain: 0/10  Location: NA    Fatigue: 3/10     OBJECTIVE     Objective Measures updated at progress report unless specified.     Treatment     Maryann received the treatments listed below:      therapeutic exercises to develop strength, endurance, ROM, posture, and core stabilization for 40 minutes including:    Nustep 10 min - level 2.0 - 4/10 fatigue - BUE/BLE     At parallel bars: 2# weight on B LE   Hip Abduction 2x10   Hip Extension 2x10   Knee flexion x10 B 6/10 and 7/10 fatigue level   Mini squats, bilateral upper extremity support, 2x10                Step up 4" 10x2 - 7/10, 7/10  - 2# ankle weights     -cones for vc to prevent circumduction  NBOS, foam, eyes open  2x30 seconds              WBOS, foam, eyes open 2x30 seconds               WBOS, foam, eyes closed 2x20"     Patient Education and Home Exercises     Home Exercises Provided and Patient Education Provided     Education " provided:   - role of PT, plan of care (POC), scheduling     Written Home Exercises Provided: Patient instructed to cont prior HEP.  Exercises were reviewed and Maryann was able to demonstrate them prior to the end of the session.  Maryann demonstrated fair  understanding of the education provided.      See EMR under Patient Instructions for exercises provided at future visits and at previous bout of therapy visits.    ASSESSMENT     Maryann tolerated session well with no adverse response noted. Pt with appropriate challenge noted to all prescribed activities. Pt required multiple seated rest breaks today throughout activities to prevent over fatigue. Pt with most difficulty balancing with vision eliminated activities. Improved clearance of LE's noted this session with less circumduction/compensation. Pt remains appropriate for therapy at this time.     Maryann Is progressing well towards her goals.   Pt prognosis is Good.     Pt will continue to benefit from skilled outpatient physical therapy to address the deficits listed in the problem list box on initial evaluation, provide pt/family education and to maximize pt's level of independence in the home and community environment.     Pt's spiritual, cultural and educational needs considered and pt agreeable to plan of care and goals.     Anticipated barriers to physical therapy: disease progression, scheduling    Goals: as of 11/17/22  Short Term Goals: 4 weeks   1. Pt will perform all bed mobility tasks with CGA or better so that they can improve trunk control and decrease caregiver burden. - improving  2. Patient will complete stand pivot transfer with CGA and appropriate assistive device with good body mechanics to promote safe functional transfers. - MET 10/14/22  3. Patient will transfer sit to/from standing with CGA and appropriate assistive device with good body mechanics to promote safe functional transfers. - MET 10/14/22  4. Patient to tolerate performance of 5  times sit to stand to demonstrate improvement in lower extremity endurance. - MET 10/14/22  5. Patient to tolerate MCTSIB testing/completion of testing to track balance and improvements in static balance. - MET 10/14/22  6. Patient to perform Timed Up and Go at 47 seconds demonstrating improvement in functional mobility. - MET 10/14/22     Long Term Goals: 8 weeks   1. Pt will perform all bed mobility tasks with SPV or better so that they can improve trunk control and decrease caregiver burden. - improving  2. Patient will be SBA with home exercise program (HEP) to promote continued rehabilitation following discharge.  3. Patient will complete stand pivot transfer with SBA and appropriate assistive device with good body mechanics to promote safe functional transfers. - improving  4. Patient will transfer sit to/from standing with SBA and appropriate assistive device with good body mechanics to promote safe functional transfers. - improving  6. Patient to perform Timed Up and Go at 41 seconds demonstrating improvement in functional mobility. - MET 11/17/22  7. NEW: Patient to perform 5xSTS at 22 seconds demonstrating improvement in functional strength. - improving   8. NEW: Patient to perform gait speed at 0.4 m/s demonstrating improved safety with household ambulation.    - improving   9. NEW: Patient to perform MCTSIB at 15+ seconds for all 4 conditions to improve safety with static standing for in home tasks.  - improving   10. NEW: Pt to perform TUG score in < / = 25 seconds in order to improve functional mobility and safety.  11. NEW: Pt to improve 6 minute walk test distance by > / = meters in order to improve functional mobility and independence.        PLAN   Current Plan of care Certification: 12/30/22 EXTEND to 3/29/23     Current POC: Continue at 1x/wk x 12 weeks with focus on Neuro-Reeducation, There-Ex, There-Act, Patient Education, Gait Training, Self Care. Continue to progress balance and strengthening  while monitoring fatigue.     Continue B LE strength and static/dynamic balance challenges as tolerated.     Elizabeth Peterson, PT, DPT  03/31/2023

## 2023-04-14 ENCOUNTER — CLINICAL SUPPORT (OUTPATIENT)
Dept: REHABILITATION | Facility: HOSPITAL | Age: 73
End: 2023-04-14
Payer: MEDICARE

## 2023-04-14 DIAGNOSIS — R26.89 BALANCE PROBLEM: Primary | ICD-10-CM

## 2023-04-14 DIAGNOSIS — R26.9 ABNORMALITY OF GAIT: ICD-10-CM

## 2023-04-14 DIAGNOSIS — R68.89 DECREASED STRENGTH, ENDURANCE, AND MOBILITY: ICD-10-CM

## 2023-04-14 DIAGNOSIS — Z74.09 DECREASED STRENGTH, ENDURANCE, AND MOBILITY: ICD-10-CM

## 2023-04-14 DIAGNOSIS — R53.1 DECREASED STRENGTH, ENDURANCE, AND MOBILITY: ICD-10-CM

## 2023-04-14 PROCEDURE — 97110 THERAPEUTIC EXERCISES: CPT | Mod: PN

## 2023-04-14 NOTE — PROGRESS NOTES
"[facility]   Physical Therapy Treatment Note    Name: Marie Lejeune  Clinic Number: [id number]    Therapy Diagnosis:   Encounter Diagnoses   Name Primary?    Balance problem Yes    Decreased strength, endurance, and mobility     Abnormality of gait        Physician: Veena Joshua MD    Visit Date: 4/14/2023    Physician Orders: PT Eval and Treat MS  Medical Diagnosis from Referral: MS (multiple sclerosis) [G35], Gait disturbance [R26.9]  Evaluation Date: 9/9/2022  Authorization Period Expiration: 8/22/23  Plan of Care Expiration: 12/30/22 EXTEND to 3/29/23  Visit # / Visits authorized: 11/20 (26 total)   PN: 1/29/23    Time In: 8:00 AM  Time Out: 8:40 AM  Total Billable Time: 40 minutes    Precautions: Standard, Fall, and Multiple Sclerosis - do not fatigue     SUBJECTIVE     Pt reports: She is doing well today, compliant with exercises at home. No falls to report.     She was compliant with home exercise program.  Response to previous treatment: no adverse response  Functional change: ongoing     Pain: 0/10  Location: NA    Fatigue: 3/10     OBJECTIVE     Objective Measures updated at progress report unless specified.     Treatment     Maryann received the treatments listed below:      therapeutic exercises to develop strength, endurance, ROM, posture, and core stabilization for 40 minutes including:    Nustep 10 min - level 2.0 - 4/10 fatigue - BUE/BLE     At parallel bars: 2# weight on B LE   Hip Abduction 2x10   Hip Extension 2x10   Knee flexion x10 B 6/10 and 7/10 fatigue level   Mini squats, bilateral upper extremity support, 2x10                Step up 4" 10x2 - 7/10, 7/10  - 2# ankle weights     -cones for vc to prevent circumduction  NBOS, foam, eyes open  2x30 seconds              WBOS, foam, eyes open 2x30 seconds               WBOS, foam, eyes closed 2x20"     Patient Education and Home Exercises     Home Exercises Provided and Patient Education Provided     Education " provided:   - role of PT, plan of care (POC), scheduling     Written Home Exercises Provided: Patient instructed to cont prior HEP.  Exercises were reviewed and Maryann was able to demonstrate them prior to the end of the session.  Maryann demonstrated fair  understanding of the education provided.      See EMR under Patient Instructions for exercises provided at future visits and at previous bout of therapy visits.    ASSESSMENT     Maryann tolerated session well with no adverse response noted. Pt with appropriate challenge noted to all prescribed activities. Pt required multiple seated rest breaks today throughout activities to prevent over fatigue. Pt with most difficulty balancing with vision eliminated activities. Fair clearance of LE's noted this session with less circumduction/compensation. Pt remains appropriate for therapy at this time.     Maryann Is progressing well towards her goals.   Pt prognosis is Good.     Pt will continue to benefit from skilled outpatient physical therapy to address the deficits listed in the problem list box on initial evaluation, provide pt/family education and to maximize pt's level of independence in the home and community environment.     Pt's spiritual, cultural and educational needs considered and pt agreeable to plan of care and goals.     Anticipated barriers to physical therapy: disease progression, scheduling    Goals: as of 11/17/22  Short Term Goals: 4 weeks   1. Pt will perform all bed mobility tasks with CGA or better so that they can improve trunk control and decrease caregiver burden. - improving  2. Patient will complete stand pivot transfer with CGA and appropriate assistive device with good body mechanics to promote safe functional transfers. - MET 10/14/22  3. Patient will transfer sit to/from standing with CGA and appropriate assistive device with good body mechanics to promote safe functional transfers. - MET 10/14/22  4. Patient to tolerate performance of 5 times  sit to stand to demonstrate improvement in lower extremity endurance. - MET 10/14/22  5. Patient to tolerate MCTSIB testing/completion of testing to track balance and improvements in static balance. - MET 10/14/22  6. Patient to perform Timed Up and Go at 47 seconds demonstrating improvement in functional mobility. - MET 10/14/22     Long Term Goals: 8 weeks   1. Pt will perform all bed mobility tasks with SPV or better so that they can improve trunk control and decrease caregiver burden. - improving  2. Patient will be SBA with home exercise program (HEP) to promote continued rehabilitation following discharge.  3. Patient will complete stand pivot transfer with SBA and appropriate assistive device with good body mechanics to promote safe functional transfers. - improving  4. Patient will transfer sit to/from standing with SBA and appropriate assistive device with good body mechanics to promote safe functional transfers. - improving  6. Patient to perform Timed Up and Go at 41 seconds demonstrating improvement in functional mobility. - MET 11/17/22  7. NEW: Patient to perform 5xSTS at 22 seconds demonstrating improvement in functional strength. - improving   8. NEW: Patient to perform gait speed at 0.4 m/s demonstrating improved safety with household ambulation.    - improving   9. NEW: Patient to perform MCTSIB at 15+ seconds for all 4 conditions to improve safety with static standing for in home tasks.  - improving   10. NEW: Pt to perform TUG score in < / = 25 seconds in order to improve functional mobility and safety.  11. NEW: Pt to improve 6 minute walk test distance by > / = meters in order to improve functional mobility and independence.        PLAN   Current Plan of care Certification: 12/30/22 EXTEND to 3/29/23     Current POC: Continue at 1x/wk x 12 weeks with focus on Neuro-Reeducation, There-Ex, There-Act, Patient Education, Gait Training, Self Care. Continue to progress balance and strengthening while  monitoring fatigue.     Continue B LE strength and static/dynamic balance challenges as tolerated.     Elizabeth Peterson, PT, DPT  04/24/2023

## 2023-04-17 ENCOUNTER — CLINICAL SUPPORT (OUTPATIENT)
Dept: REHABILITATION | Facility: HOSPITAL | Age: 73
End: 2023-04-17
Attending: PSYCHIATRY & NEUROLOGY
Payer: MEDICARE

## 2023-04-17 DIAGNOSIS — R53.1 DECREASED STRENGTH, ENDURANCE, AND MOBILITY: ICD-10-CM

## 2023-04-17 DIAGNOSIS — R68.89 DECREASED STRENGTH, ENDURANCE, AND MOBILITY: ICD-10-CM

## 2023-04-17 DIAGNOSIS — Z74.09 DECREASED STRENGTH, ENDURANCE, AND MOBILITY: ICD-10-CM

## 2023-04-17 DIAGNOSIS — R26.89 BALANCE PROBLEM: Primary | ICD-10-CM

## 2023-04-17 DIAGNOSIS — R26.9 ABNORMALITY OF GAIT: ICD-10-CM

## 2023-04-17 PROCEDURE — 97110 THERAPEUTIC EXERCISES: CPT | Mod: PN

## 2023-04-17 NOTE — PROGRESS NOTES
"[facility]   Physical Therapy Treatment Note    Name: Marie Lejeune  Clinic Number: [id number]    Therapy Diagnosis:   Encounter Diagnoses   Name Primary?    Balance problem Yes    Decreased strength, endurance, and mobility     Abnormality of gait        Physician: Veena Joshua MD    Visit Date: 4/17/2023    Physician Orders: PT Eval and Treat MS  Medical Diagnosis from Referral: MS (multiple sclerosis) [G35], Gait disturbance [R26.9]  Evaluation Date: 9/9/2022  Authorization Period Expiration: 8/22/23  Plan of Care Expiration: 12/30/22 EXTEND to 3/29/23  Visit # / Visits authorized: 12/20 (27 total)    Time In: 8:50 AM  Time Out: 9:30 AM  Total Billable Time: 40 minutes    Precautions: Standard, Fall, and Multiple Sclerosis - do not fatigue     SUBJECTIVE     Pt reports: She is doing well today, compliant with exercises at home. No falls to report.     She was compliant with home exercise program.  Response to previous treatment: no adverse response  Functional change: ongoing     Pain: 0/10  Location: NA    Fatigue: 3/10     OBJECTIVE     Objective Measures updated at progress report unless specified.     Treatment     Maryann received the treatments listed below:      therapeutic exercises to develop strength, endurance, ROM, posture, and core stabilization for 40 minutes including:    Nustep 11 min - level 2.0 - 4/10 fatigue - BUE/BLE     At parallel bars: 2# weight on B LE   Hip Abduction 2x10   Hip Extension 2x10   Knee flexion x10 B 6/10 and 7/10 fatigue level   Mini squats, bilateral upper extremity support, 2x10                Step up 4" 10x2 - 7/10, 7/10  - 2# ankle weights     -cones for vc to prevent circumduction  NBOS, foam, eyes open  2x30 seconds              WBOS, foam, eyes open 2x30 seconds               WBOS, foam, eyes closed 2x20"     Patient Education and Home Exercises     Home Exercises Provided and Patient Education Provided     Education provided:   - role " of PT, plan of care (POC), scheduling     Written Home Exercises Provided: Patient instructed to cont prior HEP.  Exercises were reviewed and Maryann was able to demonstrate them prior to the end of the session.  Maryann demonstrated fair  understanding of the education provided.      See EMR under Patient Instructions for exercises provided at future visits and at previous bout of therapy visits.    ASSESSMENT     Maryann tolerated session well with no adverse response noted. Pt with appropriate challenge noted to all prescribed activities. Pt required multiple seated rest breaks today throughout activities to prevent over fatigue. Pt with most difficulty balancing with vision eliminated activities. Improved clearance of LE's noted this session with less circumduction/compensation. Pt remains appropriate for therapy at this time.     Maryann Is progressing well towards her goals.   Pt prognosis is Good.     Pt will continue to benefit from skilled outpatient physical therapy to address the deficits listed in the problem list box on initial evaluation, provide pt/family education and to maximize pt's level of independence in the home and community environment.     Pt's spiritual, cultural and educational needs considered and pt agreeable to plan of care and goals.     Anticipated barriers to physical therapy: disease progression, scheduling    Goals: as of 11/17/22  Short Term Goals: 4 weeks   1. Pt will perform all bed mobility tasks with CGA or better so that they can improve trunk control and decrease caregiver burden. - improving  2. Patient will complete stand pivot transfer with CGA and appropriate assistive device with good body mechanics to promote safe functional transfers. - MET 10/14/22  3. Patient will transfer sit to/from standing with CGA and appropriate assistive device with good body mechanics to promote safe functional transfers. - MET 10/14/22  4. Patient to tolerate performance of 5 times sit to stand to  demonstrate improvement in lower extremity endurance. - MET 10/14/22  5. Patient to tolerate MCTSIB testing/completion of testing to track balance and improvements in static balance. - MET 10/14/22  6. Patient to perform Timed Up and Go at 47 seconds demonstrating improvement in functional mobility. - MET 10/14/22     Long Term Goals: 8 weeks   1. Pt will perform all bed mobility tasks with SPV or better so that they can improve trunk control and decrease caregiver burden. - improving  2. Patient will be SBA with home exercise program (HEP) to promote continued rehabilitation following discharge.  3. Patient will complete stand pivot transfer with SBA and appropriate assistive device with good body mechanics to promote safe functional transfers. - improving  4. Patient will transfer sit to/from standing with SBA and appropriate assistive device with good body mechanics to promote safe functional transfers. - improving  6. Patient to perform Timed Up and Go at 41 seconds demonstrating improvement in functional mobility. - MET 11/17/22  7. NEW: Patient to perform 5xSTS at 22 seconds demonstrating improvement in functional strength. - improving   8. NEW: Patient to perform gait speed at 0.4 m/s demonstrating improved safety with household ambulation.    - improving   9. NEW: Patient to perform MCTSIB at 15+ seconds for all 4 conditions to improve safety with static standing for in home tasks.  - improving   10. NEW: Pt to perform TUG score in < / = 25 seconds in order to improve functional mobility and safety.  11. NEW: Pt to improve 6 minute walk test distance by > / = meters in order to improve functional mobility and independence.        PLAN   Current Plan of care Certification: 12/30/22 EXTEND to 3/29/23     Current POC: Continue at 1x/wk x 12 weeks with focus on Neuro-Reeducation, There-Ex, There-Act, Patient Education, Gait Training, Self Care. Continue to progress balance and strengthening while monitoring  fatigue.     Continue B LE strength and static/dynamic balance challenges as tolerated.     Elizabeth Peterson, PT, DPT  04/17/2023

## 2023-04-19 ENCOUNTER — OFFICE VISIT (OUTPATIENT)
Dept: NEUROLOGY | Facility: CLINIC | Age: 73
End: 2023-04-19
Payer: MEDICARE

## 2023-04-19 VITALS
BODY MASS INDEX: 31.64 KG/M2 | DIASTOLIC BLOOD PRESSURE: 71 MMHG | SYSTOLIC BLOOD PRESSURE: 138 MMHG | HEIGHT: 60 IN | HEART RATE: 74 BPM

## 2023-04-19 DIAGNOSIS — G35 MULTIPLE SCLEROSIS: ICD-10-CM

## 2023-04-19 DIAGNOSIS — R53.82 CHRONIC FATIGUE: ICD-10-CM

## 2023-04-19 DIAGNOSIS — Z71.89 COUNSELING REGARDING GOALS OF CARE: ICD-10-CM

## 2023-04-19 DIAGNOSIS — R26.9 GAIT DISTURBANCE: Primary | ICD-10-CM

## 2023-04-19 PROCEDURE — 99215 OFFICE O/P EST HI 40 MIN: CPT | Mod: S$PBB,,, | Performed by: PSYCHIATRY & NEUROLOGY

## 2023-04-19 PROCEDURE — 99999 PR PBB SHADOW E&M-EST. PATIENT-LVL III: CPT | Mod: PBBFAC,,, | Performed by: PSYCHIATRY & NEUROLOGY

## 2023-04-19 PROCEDURE — 99999 PR PBB SHADOW E&M-EST. PATIENT-LVL III: ICD-10-PCS | Mod: PBBFAC,,, | Performed by: PSYCHIATRY & NEUROLOGY

## 2023-04-19 PROCEDURE — 99213 OFFICE O/P EST LOW 20 MIN: CPT | Mod: PBBFAC | Performed by: PSYCHIATRY & NEUROLOGY

## 2023-04-19 PROCEDURE — 99215 PR OFFICE/OUTPT VISIT, EST, LEVL V, 40-54 MIN: ICD-10-PCS | Mod: S$PBB,,, | Performed by: PSYCHIATRY & NEUROLOGY

## 2023-04-19 RX ORDER — MODAFINIL 100 MG/1
100 TABLET ORAL DAILY
Qty: 90 TABLET | Refills: 1 | Status: SHIPPED | OUTPATIENT
Start: 2023-05-19 | End: 2023-10-19 | Stop reason: SDUPTHER

## 2023-04-19 RX ORDER — SERTRALINE HYDROCHLORIDE 50 MG/1
50 TABLET, FILM COATED ORAL DAILY
Qty: 30 TABLET | Refills: 11
Start: 2023-04-19 | End: 2024-04-18

## 2023-04-19 RX ORDER — GLATIRAMER 40 MG/ML
40 INJECTION, SOLUTION SUBCUTANEOUS
Qty: 36 ML | Refills: 3 | Status: SHIPPED | OUTPATIENT
Start: 2023-04-19 | End: 2023-09-25

## 2023-04-19 NOTE — PROGRESS NOTES
Subjective:          Patient ID: Marie Lejeune is a 72 y.o. female who presents today for a routine clinic visit for MS.  She is accompanied by her      MS HPI:  DMT: glatiramer acetate  Side effects from DMT? No  Taking vitamin D3 as recommended? Yes - 5,000 M-F  Feeling stable; exercises regularly; doing PT once /week on [facility] [place]  No sense of progressive decline or relapse.   Not taking dalfampridine b/c her CrCl was borderline    Medications:  Current Outpatient Medications   Medication Sig    ALPRAZolam (XANAX) 0.25 MG tablet Take 0.25 mg by mouth every 8 (eight) hours as needed.    amLODIPine (NORVASC) 10 MG tablet Take 10 mg by mouth once daily.    ascorbic acid, vitamin C, (VITAMIN C) 500 MG tablet Take 500 mg by mouth once daily.    atenoloL (TENORMIN) 25 MG tablet Take 25 mg by mouth 2 (two) times daily.    biotin 300 mcg Tab PATIENT NOT CERTAIN ON THE DOSAGE. TAKE ONE capsule by MOUTH daily    calcium carbonate (OS-ANISHA) 600 mg (1,500 mg) Tab Take 600 mg by mouth 2 (two) times daily with meals.    co-enzyme Q-10 30 mg capsule Take 400 mg by mouth once daily.    cyanocobalamin 500 MCG tablet Take 500 mcg by mouth once daily.    dalfampridine 10 mg Tb12 TAKE 1 TABLET EVERY 12 HOURS    ergocalciferol (VITAMIN D2) 50,000 unit Cap Take 5,000 Units by mouth once daily.     fish oil-omega-3 fatty acids 300-1,000 mg capsule Take 1,000 g by mouth once daily.    GLATOPA 40 mg/mL injection INJECT 40 MG UNDER THE SKIN THREE TIMES A WEEK    hydroCHLOROthiazide (MICROZIDE) 12.5 mg capsule Take 12.5 mg by mouth once daily.    losartan (COZAAR) 100 MG tablet     modafiniL (PROVIGIL) 100 MG Tab TAKE 1 TABLET DAILY    oxybutynin (DITROPAN) 5 MG Tab TAKE 1 TABLET DAILY IN THE EVENING    sertraline (ZOLOFT) 50 MG tablet Take 1 tablet (50 mg total) by mouth once daily.    simvastatin (ZOCOR) 40 MG tablet Take 40 mg by mouth every evening.     No current facility-administered medications for this visit.       SOCIAL  HISTORY  Social History     Tobacco Use    Smoking status: Never    Smokeless tobacco: Never       Living arrangements - the patient lives with their spouse.                   Objective:          Timed 25 Foot Walk: 8/1/2022 4/19/2023   Did patient wear an AFO? No No   Was assistive device used? Yes Yes   Assistive device used (cira one): Bilateral Assistance Bilateral Assistance   Bilateral device used Walker/Rollator Walker/Rollator   Time for 25 Foot Walk (seconds) 19.4 20.9   Time for 25 Foot Walk (seconds) 19.9 21       Neurologic Exam  MS: intact,   CN: no dysarthria or facial asymmetry;   No LASHON  MOTOR: LLE 4/5 in flexors;   Sensory -- moderate loss of vibration in LE katherin   GAIT: slow, walker; mild hyperextension right knee with right circumduction;  Rollator       Imaging:     No results found for this or any previous visit.    No results found for this or any previous visit.    No results found for this or any previous visit.    Results for orders placed during the hospital encounter of 09/01/22    MRI Brain Demyelinating W W/O Contrast    Impression  White matter lesions consistent with demyelination/multiple sclerosis unchanged from the prior study.  There is a mild-moderate burden of white matter lesions.  These are all diffusion negative nonenhancing and no new lesions or diffusion positive lesions are seen.    Involutional change probably age appropriate.      Electronically signed by: Baldev Huynh MD  Date:    09/02/2022  Time:    09:30    No results found for this or any previous visit.    No results found for this or any previous visit.        Labs:     Lab Results   Component Value Date    RERPTVHU84AM 90 08/01/2022    TIYZNFJD88ED 68 06/09/2021    XMWVORPY97YI 98 (H) 01/22/2020     Lab Results   Component Value Date    JCVINDEX 2.87 (A) 07/14/2016    JCVANTIBODY Positive (A) 07/14/2016     Lab Results   Component Value Date    GA5TWTJT 64.8 11/14/2016    ABSOLUTECD3 654 (L) 11/14/2016     IQ3JNVQY 20.5 11/14/2016    ABSOLUTECD8 207 11/14/2016    BF0VWJSK 44.1 11/14/2016    ABSOLUTECD4 445 11/14/2016    LABCD48 2.15 11/14/2016     Lab Results   Component Value Date    WBC 7.38 01/24/2022    RBC 4.24 01/24/2022    HGB 12.9 01/24/2022    HCT 39.4 01/24/2022    MCV 93 01/24/2022    MCH 30.4 01/24/2022    MCHC 32.7 01/24/2022    RDW 13.4 01/24/2022     01/24/2022    MPV 10.9 01/24/2022    GRAN 5.1 01/24/2022    GRAN 68.5 01/24/2022    LYMPH 1.3 01/24/2022    LYMPH 17.6 (L) 01/24/2022    MONO 0.7 01/24/2022    MONO 9.2 01/24/2022    EOS 0.3 01/24/2022    BASO 0.05 01/24/2022    EOSINOPHIL 3.7 01/24/2022    BASOPHIL 0.7 01/24/2022     Sodium   Date Value Ref Range Status   11/30/2022 138 136 - 145 mmol/L Final     Potassium   Date Value Ref Range Status   11/30/2022 4.0 3.5 - 5.1 mmol/L Final     Chloride   Date Value Ref Range Status   11/30/2022 106 95 - 110 mmol/L Final     CO2   Date Value Ref Range Status   11/30/2022 27 23 - 29 mmol/L Final     Glucose   Date Value Ref Range Status   11/30/2022 96 70 - 110 mg/dL Final     BUN   Date Value Ref Range Status   11/30/2022 30 (H) 8 - 23 mg/dL Final     Creatinine   Date Value Ref Range Status   11/30/2022 0.9 0.5 - 1.4 mg/dL Final     Calcium   Date Value Ref Range Status   11/30/2022 10.1 8.7 - 10.5 mg/dL Final     Total Protein   Date Value Ref Range Status   01/24/2022 8.1 6.0 - 8.4 g/dL Final     Albumin   Date Value Ref Range Status   01/24/2022 3.8 3.5 - 5.2 g/dL Final     Total Bilirubin   Date Value Ref Range Status   01/24/2022 0.5 0.1 - 1.0 mg/dL Final     Comment:     For infants and newborns, interpretation of results should be based  on gestational age, weight and in agreement with clinical  observations.    Premature Infant recommended reference ranges:  Up to 24 hours.............<8.0 mg/dL  Up to 48 hours............<12.0 mg/dL  3-5 days..................<15.0 mg/dL  6-29 days.................<15.0 mg/dL       Alkaline Phosphatase    Date Value Ref Range Status   01/24/2022 71 55 - 135 U/L Final     AST   Date Value Ref Range Status   01/24/2022 20 10 - 40 U/L Final     ALT   Date Value Ref Range Status   01/24/2022 17 10 - 44 U/L Final     Anion Gap   Date Value Ref Range Status   11/30/2022 5 (L) 8 - 16 mmol/L Final     eGFR if    Date Value Ref Range Status   01/24/2022 >60.0 >60 mL/min/1.73 m^2 Final     eGFR if non    Date Value Ref Range Status   01/24/2022 >60.0 >60 mL/min/1.73 m^2 Final     Comment:     Calculation used to obtain the estimated glomerular filtration  rate (eGFR) is the CKD-EPI equation.        No results found for: HEPBSAG, HEPBSAB, HEPBCAB        MS Impression and Plan:     NEURO MULTIPLE SCLEROSIS IMPRESSION:   MS Status:     Number of relapses in the past year?:  0    Clinical Progression:  Clinically Stable    MRI Progression:  Stable  Plan:     DMT:  No change in management    Symptom Management:  No change in symptom management     She is stable with static disability   GA refilled today along with sertraline and provigil;   No longer will prescribe dalfampridine given borderline CrCl  F/u 6 mo [facility]  MRIs every 2-3 years assuming clinical stability       Problem List Items Addressed This Visit          Unprioritized    Counseling regarding goals of care    Chronic fatigue    Relevant Medications    modafiniL (PROVIGIL) 100 MG Tab (Start on 5/19/2023)    Multiple sclerosis    Relevant Medications    modafiniL (PROVIGIL) 100 MG Tab (Start on 5/19/2023)    glatiramer (GLATOPA) 40 mg/mL injection     Other Visit Diagnoses       Gait disturbance    -  Primary            Veena MORENITA Joshua MD    I spent a total of 40 minutes on the day of the visit.This includes face to face time and non-face to face time preparing to see the patient (eg, review of tests), obtaining and/or reviewing separately obtained history, documenting clinical information in the electronic or other health  record, independently interpreting results and communicating results to the patient/family/caregiver, or care coordinator.

## 2023-04-27 ENCOUNTER — CLINICAL SUPPORT (OUTPATIENT)
Dept: REHABILITATION | Facility: HOSPITAL | Age: 73
End: 2023-04-27
Payer: MEDICARE

## 2023-04-27 DIAGNOSIS — Z74.09 DECREASED STRENGTH, ENDURANCE, AND MOBILITY: ICD-10-CM

## 2023-04-27 DIAGNOSIS — R68.89 DECREASED STRENGTH, ENDURANCE, AND MOBILITY: ICD-10-CM

## 2023-04-27 DIAGNOSIS — R53.1 DECREASED STRENGTH, ENDURANCE, AND MOBILITY: ICD-10-CM

## 2023-04-27 DIAGNOSIS — R26.89 BALANCE PROBLEM: Primary | ICD-10-CM

## 2023-04-27 DIAGNOSIS — R26.9 ABNORMALITY OF GAIT: ICD-10-CM

## 2023-04-27 PROCEDURE — 97110 THERAPEUTIC EXERCISES: CPT

## 2023-04-27 NOTE — PROGRESS NOTES
"[facility]   Physical Therapy Treatment Note    Name: Marie Lejeune  Clinic Number: [id number]    Therapy Diagnosis:   Encounter Diagnoses   Name Primary?    Balance problem Yes    Decreased strength, endurance, and mobility     Abnormality of gait      Physician: Veena Joshua MD    Visit Date: 4/27/2023    Physician Orders: PT Eval and Treat MS  Medical Diagnosis from Referral: MS (multiple sclerosis) [G35], Gait disturbance [R26.9]  Evaluation Date: 9/9/2022  Authorization Period Expiration: 8/22/23  Plan of Care Expiration: 12/30/22 EXTEND to 3/29/23  Visit # / Visits authorized: 13/20 (28 total)    Time In: 1030  Time Out: 1113  Total Billable Time: 43 minutes    Precautions: Standard, Fall, and Multiple Sclerosis - do not fatigue     SUBJECTIVE     Pt reports: She is doing well today, endorses no complaints or fatigue at the start of the session.     She was compliant with home exercise program.  Response to previous treatment: no adverse response  Functional change: ongoing     Pain: 0/10  Location: NA    Fatigue: 0/10     OBJECTIVE     Objective Measures updated at progress report unless specified.     Treatment     Maryann received the treatments listed below:      therapeutic exercises to develop strength, endurance, ROM, posture, and core stabilization for 43 minutes including:    SciFit recumbent stepper level 2 x10 min with BUE/BLE - no fatigue reported    At parallel bars with 2# weight on BLE:   Hip abduction 2 x10  Hip extension 2 x10  Knee flexion 2 x10  Standing calf raises 2 x10 - 6/10 fatigue level in BLE's  Squats, BUE supported on bar 2 x10  WBOS, foam, eyes open 2 x30"  WBOS, foam, eyes closed 2 x30"  WBOS, foam, eyes open marching (weight shift with mini heel off) 2 x30" - 5/10 fatigue level in BLE's    Patient Education and Home Exercises     Home Exercises Provided and Patient Education Provided     Education provided:   - role of PT, plan of care (POC), " scheduling     Written Home Exercises Provided: Patient instructed to cont prior HEP.  Exercises were reviewed and Maryann was able to demonstrate them prior to the end of the session.  Maryann demonstrated fair  understanding of the education provided.      See EMR under Patient Instructions for exercises provided at future visits and at previous bout of therapy visits.    ASSESSMENT     Maryann tolerated session well with no adverse response noted. Pt with appropriate challenge noted to all prescribed activities with no fatigue noted until closer to the end of the session on today with rest breaks provided throughout the session. She continues to have difficulty maintaining her balance when her eyes are closed, required intermittent Min A and UE support on parallel bars to prevent excessive loss of balance. She continues to be appropriate for skilled PT services to improve her balance and activity tolerance.     Maryann Is progressing well towards her goals.   Pt prognosis is Good.     Pt will continue to benefit from skilled outpatient physical therapy to address the deficits listed in the problem list box on initial evaluation, provide pt/family education and to maximize pt's level of independence in the home and community environment.     Pt's spiritual, cultural and educational needs considered and pt agreeable to plan of care and goals.     Anticipated barriers to physical therapy: disease progression, scheduling    Goals: as of 11/17/22  Short Term Goals: 4 weeks   1. Pt will perform all bed mobility tasks with CGA or better so that they can improve trunk control and decrease caregiver burden. - improving  2. Patient will complete stand pivot transfer with CGA and appropriate assistive device with good body mechanics to promote safe functional transfers. - MET 10/14/22  3. Patient will transfer sit to/from standing with CGA and appropriate assistive device with good body mechanics to promote safe functional  transfers. - MET 10/14/22  4. Patient to tolerate performance of 5 times sit to stand to demonstrate improvement in lower extremity endurance. - MET 10/14/22  5. Patient to tolerate MCTSIB testing/completion of testing to track balance and improvements in static balance. - MET 10/14/22  6. Patient to perform Timed Up and Go at 47 seconds demonstrating improvement in functional mobility. - MET 10/14/22     Long Term Goals: 8 weeks   1. Pt will perform all bed mobility tasks with SPV or better so that they can improve trunk control and decrease caregiver burden. - improving  2. Patient will be SBA with home exercise program (HEP) to promote continued rehabilitation following discharge.  3. Patient will complete stand pivot transfer with SBA and appropriate assistive device with good body mechanics to promote safe functional transfers. - improving  4. Patient will transfer sit to/from standing with SBA and appropriate assistive device with good body mechanics to promote safe functional transfers. - improving  6. Patient to perform Timed Up and Go at 41 seconds demonstrating improvement in functional mobility. - MET 11/17/22  7. NEW: Patient to perform 5xSTS at 22 seconds demonstrating improvement in functional strength. - improving   8. NEW: Patient to perform gait speed at 0.4 m/s demonstrating improved safety with household ambulation.    - improving   9. NEW: Patient to perform MCTSIB at 15+ seconds for all 4 conditions to improve safety with static standing for in home tasks.  - improving   10. NEW: Pt to perform TUG score in < / = 25 seconds in order to improve functional mobility and safety.  11. NEW: Pt to improve 6 minute walk test distance by > / = meters in order to improve functional mobility and independence.        PLAN   Current Plan of care Certification: 12/30/22 EXTEND to 3/29/23     Current POC: Continue at 1x/wk x 12 weeks with focus on Neuro-Reeducation, There-Ex, There-Act, Patient Education, Gait  Training, Self Care. Continue to progress balance and strengthening while monitoring fatigue.     Continue B LE strength and static/dynamic balance challenges as tolerated.     Joycelyn Clement, PT, DPT  04/27/2023

## 2023-05-04 ENCOUNTER — CLINICAL SUPPORT (OUTPATIENT)
Dept: REHABILITATION | Facility: HOSPITAL | Age: 73
End: 2023-05-04
Payer: MEDICARE

## 2023-05-04 DIAGNOSIS — R68.89 DECREASED STRENGTH, ENDURANCE, AND MOBILITY: ICD-10-CM

## 2023-05-04 DIAGNOSIS — R26.9 ABNORMALITY OF GAIT: ICD-10-CM

## 2023-05-04 DIAGNOSIS — R26.89 BALANCE PROBLEM: Primary | ICD-10-CM

## 2023-05-04 DIAGNOSIS — R53.1 DECREASED STRENGTH, ENDURANCE, AND MOBILITY: ICD-10-CM

## 2023-05-04 DIAGNOSIS — Z74.09 DECREASED STRENGTH, ENDURANCE, AND MOBILITY: ICD-10-CM

## 2023-05-04 PROCEDURE — 97112 NEUROMUSCULAR REEDUCATION: CPT | Mod: PN

## 2023-05-04 PROCEDURE — 97110 THERAPEUTIC EXERCISES: CPT | Mod: PN

## 2023-05-04 NOTE — PROGRESS NOTES
"[facility]   Physical Therapy Treatment Note    Name: Marie Lejeune  Clinic Number: [id number]    Therapy Diagnosis:   Encounter Diagnoses   Name Primary?    Balance problem Yes    Decreased strength, endurance, and mobility     Abnormality of gait        Physician: Veena Joshua MD    Visit Date: 5/4/2023    Physician Orders: PT Eval and Treat MS  Medical Diagnosis from Referral: MS (multiple sclerosis) [G35], Gait disturbance [R26.9]  Evaluation Date: 9/9/2022  Authorization Period Expiration: 8/22/23  Plan of Care Expiration: 12/30/22 EXTEND to 3/29/23  Visit # / Visits authorized: 14/20 (28 total)    Time In: 11:15  Time Out: 12:00  Total Billable Time: 45 minutes    Precautions: Standard, Fall, and Multiple Sclerosis - do not fatigue     SUBJECTIVE     Pt reports: She is doing well today, endorses no complaints or fatigue at the start of the session.     She was compliant with home exercise program.  Response to previous treatment: no adverse response  Functional change: ongoing     Pain: 0/10  Location: NA    Fatigue: 0/10     OBJECTIVE     Objective Measures updated at progress report unless specified.     Treatment     Maryann received the treatments listed below:      therapeutic exercises to develop strength, endurance, ROM, posture, and core stabilization for 15 minutes including:    SciFit recumbent stepper level 2 x10 min with BUE/BLE - no fatigue reported    At parallel bars:  Hip abduction 2 x10  Hip extension 2 x10    Neuro re education for 30 min  At parallel bars   Forward step over pool noodle x 10 each side, with upper extremity assist  WBOS, foam, eyes open 2 x30"  WBOS, foam, head rotation 2 x30"  WBOS, foam, eyes open marching (weight shift with mini heel off) 2 x30" - with upper extremity assist   WBOS, foam squats 2 x 10 , upper extremity assist     Patient Education and Home Exercises     Home Exercises Provided and Patient Education Provided     Education " provided:   - role of PT, plan of care (POC), scheduling     Written Home Exercises Provided: Patient instructed to cont prior HEP.  Exercises were reviewed and Maryann was able to demonstrate them prior to the end of the session.  Maryann demonstrated fair  understanding of the education provided.      See EMR under Patient Instructions for exercises provided at future visits and at previous bout of therapy visits.    ASSESSMENT     Maryann tolerated session well with no adverse response noted. She requires multiple seated rest breaks secondary to fatigue.  She demo's fair foot clearance over pool noodle, occasionally hits noodle with bilateral feet. She is motivated to maintain and improve mobility.     Maryann Is progressing well towards her goals.   Pt prognosis is Good.     Pt will continue to benefit from skilled outpatient physical therapy to address the deficits listed in the problem list box on initial evaluation, provide pt/family education and to maximize pt's level of independence in the home and community environment.     Pt's spiritual, cultural and educational needs considered and pt agreeable to plan of care and goals.     Anticipated barriers to physical therapy: disease progression, scheduling    Goals: as of 11/17/22  Short Term Goals: 4 weeks   1. Pt will perform all bed mobility tasks with CGA or better so that they can improve trunk control and decrease caregiver burden. - improving  2. Patient will complete stand pivot transfer with CGA and appropriate assistive device with good body mechanics to promote safe functional transfers. - MET 10/14/22  3. Patient will transfer sit to/from standing with CGA and appropriate assistive device with good body mechanics to promote safe functional transfers. - MET 10/14/22  4. Patient to tolerate performance of 5 times sit to stand to demonstrate improvement in lower extremity endurance. - MET 10/14/22  5. Patient to tolerate MCTSIB testing/completion of testing to  track balance and improvements in static balance. - MET 10/14/22  6. Patient to perform Timed Up and Go at 47 seconds demonstrating improvement in functional mobility. - MET 10/14/22     Long Term Goals: 8 weeks   1. Pt will perform all bed mobility tasks with SPV or better so that they can improve trunk control and decrease caregiver burden. - improving  2. Patient will be SBA with home exercise program (HEP) to promote continued rehabilitation following discharge.  3. Patient will complete stand pivot transfer with SBA and appropriate assistive device with good body mechanics to promote safe functional transfers. - improving  4. Patient will transfer sit to/from standing with SBA and appropriate assistive device with good body mechanics to promote safe functional transfers. - improving  6. Patient to perform Timed Up and Go at 41 seconds demonstrating improvement in functional mobility. - MET 11/17/22  7. NEW: Patient to perform 5xSTS at 22 seconds demonstrating improvement in functional strength. - improving   8. NEW: Patient to perform gait speed at 0.4 m/s demonstrating improved safety with household ambulation.    - improving   9. NEW: Patient to perform MCTSIB at 15+ seconds for all 4 conditions to improve safety with static standing for in home tasks.  - improving   10. NEW: Pt to perform TUG score in < / = 25 seconds in order to improve functional mobility and safety.  11. NEW: Pt to improve 6 minute walk test distance by > / = meters in order to improve functional mobility and independence.        PLAN   Current Plan of care Certification: 12/30/22 EXTEND to 3/29/23     Current POC: Continue at 1x/wk x 12 weeks with focus on Neuro-Reeducation, There-Ex, There-Act, Patient Education, Gait Training, Self Care. Continue to progress balance and strengthening while monitoring fatigue.     Continue B LE strength and static/dynamic balance challenges as tolerated.     Preeti Urbina, PT, DPT  05/04/2023

## 2023-05-11 ENCOUNTER — CLINICAL SUPPORT (OUTPATIENT)
Dept: REHABILITATION | Facility: HOSPITAL | Age: 73
End: 2023-05-11
Payer: MEDICARE

## 2023-05-11 DIAGNOSIS — R26.9 ABNORMALITY OF GAIT: ICD-10-CM

## 2023-05-11 DIAGNOSIS — R53.1 DECREASED STRENGTH, ENDURANCE, AND MOBILITY: ICD-10-CM

## 2023-05-11 DIAGNOSIS — Z74.09 DECREASED STRENGTH, ENDURANCE, AND MOBILITY: ICD-10-CM

## 2023-05-11 DIAGNOSIS — R68.89 DECREASED STRENGTH, ENDURANCE, AND MOBILITY: ICD-10-CM

## 2023-05-11 DIAGNOSIS — R26.89 BALANCE PROBLEM: Primary | ICD-10-CM

## 2023-05-11 PROCEDURE — 97110 THERAPEUTIC EXERCISES: CPT | Mod: PN

## 2023-05-11 PROCEDURE — 97112 NEUROMUSCULAR REEDUCATION: CPT | Mod: PN

## 2023-05-11 NOTE — PROGRESS NOTES
"[facility]   Physical Therapy Treatment Note    Name: Marie Lejeune  Clinic Number: [id number]    Therapy Diagnosis:   Encounter Diagnoses   Name Primary?    Balance problem Yes    Decreased strength, endurance, and mobility     Abnormality of gait        Physician: Veena Joshua MD    Visit Date: 5/11/2023    Physician Orders: PT Eval and Treat MS  Medical Diagnosis from Referral: MS (multiple sclerosis) [G35], Gait disturbance [R26.9]  Evaluation Date: 9/9/2022  Authorization Period Expiration: 8/22/23  Plan of Care Expiration: 12/30/22 EXTEND to 3/29/23  Visit # / Visits authorized: 15/20 (28 total)    Time In: 11:10  Time Out: 12:04  Total Billable Time: 54 minutes    Precautions: Standard, Fall, and Multiple Sclerosis - do not fatigue     SUBJECTIVE     Pt reports: She is doing well today, no complaints.      She was compliant with home exercise program.  Response to previous treatment: no adverse response  Functional change: ongoing     Pain: 0/10  Location: NA    Fatigue: 0/10     OBJECTIVE     Objective Measures updated at progress report unless specified.     Treatment     Maryann received the treatments listed below:      therapeutic exercises to develop strength, endurance, ROM, posture, and core stabilization for 16 minutes including:  SciFit recumbent stepper level 2 x10 min with BUE/BLE - no fatigue reported    2 x 10 Seated hip add with ball squeeze     2 X 10 Seated upper extremity ball squeeze with forward reach    Neuro re education for 38 min    At parallel bars: all with B upper extremity assist   On foam pad:  Step tap  x 10 each lower extremity   Step up onto pad x 10 each side   Squats on foam pad x 10   WBOS,  head rotation 2 x30"    Forward step over pool noodle  with anterior weight shift x 10 each side, with upper extremity assist  Lateral step over pool noodles with lateral weight shift x 10   Backward step with posterior weight shift x 10 each " side    Standing on black rocker board:   A<>P weight shifting x 15   M<>lateral weight shifting x 15    Seated rest breaks after each activity.     Ambulated with patient into and out of clinic mod Independent with rollator     Patient Education and Home Exercises     Home Exercises Provided and Patient Education Provided     Education provided:   - role of PT, plan of care (POC), scheduling     Written Home Exercises Provided: Patient instructed to cont prior HEP.  Exercises were reviewed and Maryann was able to demonstrate them prior to the end of the session.  Maryann demonstrated fair  understanding of the education provided.      See EMR under Patient Instructions for exercises provided at future visits and at previous bout of therapy visits.    ASSESSMENT     Maryann tolerated session well with no adverse response noted. Given cues for slight knee flexion in stance to increase quad and glut activation.  She was able to perform mostly standing exercises today with some seated rest breaks.  She demo's decreased gait brett post session likely due to fatigue.      Maryann Is progressing well towards her goals.   Pt prognosis is Good.     Pt will continue to benefit from skilled outpatient physical therapy to address the deficits listed in the problem list box on initial evaluation, provide pt/family education and to maximize pt's level of independence in the home and community environment.     Pt's spiritual, cultural and educational needs considered and pt agreeable to plan of care and goals.     Anticipated barriers to physical therapy: disease progression, scheduling    Goals: as of 11/17/22  Short Term Goals: 4 weeks   1. Pt will perform all bed mobility tasks with CGA or better so that they can improve trunk control and decrease caregiver burden. - improving  2. Patient will complete stand pivot transfer with CGA and appropriate assistive device with good body mechanics to promote safe functional transfers. - MET  10/14/22  3. Patient will transfer sit to/from standing with CGA and appropriate assistive device with good body mechanics to promote safe functional transfers. - MET 10/14/22  4. Patient to tolerate performance of 5 times sit to stand to demonstrate improvement in lower extremity endurance. - MET 10/14/22  5. Patient to tolerate MCTSIB testing/completion of testing to track balance and improvements in static balance. - MET 10/14/22  6. Patient to perform Timed Up and Go at 47 seconds demonstrating improvement in functional mobility. - MET 10/14/22     Long Term Goals: 8 weeks   1. Pt will perform all bed mobility tasks with SPV or better so that they can improve trunk control and decrease caregiver burden. - improving  2. Patient will be SBA with home exercise program (HEP) to promote continued rehabilitation following discharge.  3. Patient will complete stand pivot transfer with SBA and appropriate assistive device with good body mechanics to promote safe functional transfers. - improving  4. Patient will transfer sit to/from standing with SBA and appropriate assistive device with good body mechanics to promote safe functional transfers. - improving  6. Patient to perform Timed Up and Go at 41 seconds demonstrating improvement in functional mobility. - MET 11/17/22  7. NEW: Patient to perform 5xSTS at 22 seconds demonstrating improvement in functional strength. - improving   8. NEW: Patient to perform gait speed at 0.4 m/s demonstrating improved safety with household ambulation.    - improving   9. NEW: Patient to perform MCTSIB at 15+ seconds for all 4 conditions to improve safety with static standing for in home tasks.  - improving   10. NEW: Pt to perform TUG score in < / = 25 seconds in order to improve functional mobility and safety.  11. NEW: Pt to improve 6 minute walk test distance by > / = meters in order to improve functional mobility and independence.        PLAN   Current Plan of care Certification:  12/30/22 EXTEND to 3/29/23     Current POC: Continue at 1x/wk x 12 weeks with focus on Neuro-Reeducation, There-Ex, There-Act, Patient Education, Gait Training, Self Care. Continue to progress balance and strengthening while monitoring fatigue.     Continue B LE strength and static/dynamic balance challenges as tolerated.     Preeti Urbina, PT, DPT  05/11/2023

## 2023-05-24 ENCOUNTER — CLINICAL SUPPORT (OUTPATIENT)
Dept: REHABILITATION | Facility: HOSPITAL | Age: 73
End: 2023-05-24
Payer: MEDICARE

## 2023-05-24 DIAGNOSIS — R26.89 BALANCE PROBLEM: Primary | ICD-10-CM

## 2023-05-24 DIAGNOSIS — R68.89 DECREASED STRENGTH, ENDURANCE, AND MOBILITY: ICD-10-CM

## 2023-05-24 DIAGNOSIS — R53.1 DECREASED STRENGTH, ENDURANCE, AND MOBILITY: ICD-10-CM

## 2023-05-24 DIAGNOSIS — Z74.09 DECREASED STRENGTH, ENDURANCE, AND MOBILITY: ICD-10-CM

## 2023-05-24 DIAGNOSIS — R26.9 ABNORMALITY OF GAIT: ICD-10-CM

## 2023-05-24 PROCEDURE — 97110 THERAPEUTIC EXERCISES: CPT | Mod: PN

## 2023-05-24 PROCEDURE — 97112 NEUROMUSCULAR REEDUCATION: CPT | Mod: PN

## 2023-05-29 NOTE — PROGRESS NOTES
[facility]   Physical Therapy Treatment Note    Name: Marie Lejeune  Clinic Number: [id number]    Therapy Diagnosis:   Encounter Diagnoses   Name Primary?    Balance problem Yes    Decreased strength, endurance, and mobility     Abnormality of gait        Physician: Veena Joshua MD    Visit Date: 5/24/2023    Physician Orders: PT Eval and Treat MS  Medical Diagnosis from Referral: MS (multiple sclerosis) [G35], Gait disturbance [R26.9]  Evaluation Date: 9/9/2022  Authorization Period Expiration: 8/22/23  Plan of Care Expiration: 12/30/22 EXTEND to 3/29/23  Visit # / Visits authorized: 16/20 (31 total)    Time In: 2:35 PM   Time Out: 3:15 PM  Total Billable Time: 40 minutes    Precautions: Standard, Fall, and Multiple Sclerosis - do not fatigue     SUBJECTIVE     Pt reports: She is doing well today, no complaints.      She was compliant with home exercise program.  Response to previous treatment: no adverse response  Functional change: ongoing     Pain: 0/10  Location: NA    Fatigue: 0/10     OBJECTIVE     Objective Measures updated at progress report unless specified.     Treatment     Maryann received the treatments listed below:      therapeutic exercises to develop strength, endurance, ROM, posture, and core stabilization for 10 minutes including:    SciFit recumbent stepper level 2 x10 min with BUE/BLE - no fatigue reported    Neuro re education for 30 min    At parallel bars: all with B upper extremity assist     On foam pad:  Step up onto pad x 10 each side   Squats on foam pad x 10     NBOS, firm, eyes open 2x30 seconds ea  WBOS, firm, eyes closed 2x30 seconds ea    Forward step over pool noodle  with anterior weight shift x 10 each side, with upper extremity assist  Lateral step over pool noodles with lateral weight shift x 10   Backward step with posterior weight shift x 10 each side    Seated rest breaks after each activity.     Ambulated with patient into and out of clinic  mod Independent with rollator     Patient Education and Home Exercises     Home Exercises Provided and Patient Education Provided     Education provided:   - role of PT, plan of care (POC), scheduling     Written Home Exercises Provided: Patient instructed to cont prior HEP.  Exercises were reviewed and Maryann was able to demonstrate them prior to the end of the session.  Maryann demonstrated fair  understanding of the education provided.      See EMR under Patient Instructions for exercises provided at future visits and at previous bout of therapy visits.    ASSESSMENT     Maryann tolerated session well with no adverse response noted. Pt requiring seated rest breaks following ea activity to prevent over fatigue. Pt with appropriate challenge noted to all prescribed activities. Pt with most difficulty balancing with vision eliminated. Pt remains appropriate for therapy at this time.     Maryann Is progressing well towards her goals.   Pt prognosis is Good.     Pt will continue to benefit from skilled outpatient physical therapy to address the deficits listed in the problem list box on initial evaluation, provide pt/family education and to maximize pt's level of independence in the home and community environment.     Pt's spiritual, cultural and educational needs considered and pt agreeable to plan of care and goals.     Anticipated barriers to physical therapy: disease progression, scheduling    Goals: as of 11/17/22  Short Term Goals: 4 weeks   1. Pt will perform all bed mobility tasks with CGA or better so that they can improve trunk control and decrease caregiver burden. - improving  2. Patient will complete stand pivot transfer with CGA and appropriate assistive device with good body mechanics to promote safe functional transfers. - MET 10/14/22  3. Patient will transfer sit to/from standing with CGA and appropriate assistive device with good body mechanics to promote safe functional transfers. - MET 10/14/22  4.  Patient to tolerate performance of 5 times sit to stand to demonstrate improvement in lower extremity endurance. - MET 10/14/22  5. Patient to tolerate MCTSIB testing/completion of testing to track balance and improvements in static balance. - MET 10/14/22  6. Patient to perform Timed Up and Go at 47 seconds demonstrating improvement in functional mobility. - MET 10/14/22     Long Term Goals: 8 weeks   1. Pt will perform all bed mobility tasks with SPV or better so that they can improve trunk control and decrease caregiver burden. - improving  2. Patient will be SBA with home exercise program (HEP) to promote continued rehabilitation following discharge.  3. Patient will complete stand pivot transfer with SBA and appropriate assistive device with good body mechanics to promote safe functional transfers. - improving  4. Patient will transfer sit to/from standing with SBA and appropriate assistive device with good body mechanics to promote safe functional transfers. - improving  6. Patient to perform Timed Up and Go at 41 seconds demonstrating improvement in functional mobility. - MET 11/17/22  7. NEW: Patient to perform 5xSTS at 22 seconds demonstrating improvement in functional strength. - improving   8. NEW: Patient to perform gait speed at 0.4 m/s demonstrating improved safety with household ambulation.    - improving   9. NEW: Patient to perform MCTSIB at 15+ seconds for all 4 conditions to improve safety with static standing for in home tasks.  - improving   10. NEW: Pt to perform TUG score in < / = 25 seconds in order to improve functional mobility and safety.  11. NEW: Pt to improve 6 minute walk test distance by > / = meters in order to improve functional mobility and independence.        PLAN   Current Plan of care Certification: 12/30/22 EXTEND to 3/29/23     Current POC: Continue at 1x/wk x 12 weeks with focus on Neuro-Reeducation, There-Ex, There-Act, Patient Education, Gait Training, Self Care. Continue  to progress balance and strengthening while monitoring fatigue.     Continue B LE strength and static/dynamic balance challenges as tolerated.     Elizabeth Peterson, PT, DPT  05/29/2023

## 2023-06-01 ENCOUNTER — CLINICAL SUPPORT (OUTPATIENT)
Dept: REHABILITATION | Facility: HOSPITAL | Age: 73
End: 2023-06-01
Payer: MEDICARE

## 2023-06-01 DIAGNOSIS — R26.9 ABNORMALITY OF GAIT: ICD-10-CM

## 2023-06-01 DIAGNOSIS — R53.1 DECREASED STRENGTH, ENDURANCE, AND MOBILITY: ICD-10-CM

## 2023-06-01 DIAGNOSIS — Z74.09 DECREASED STRENGTH, ENDURANCE, AND MOBILITY: ICD-10-CM

## 2023-06-01 DIAGNOSIS — R26.89 BALANCE PROBLEM: Primary | ICD-10-CM

## 2023-06-01 DIAGNOSIS — R68.89 DECREASED STRENGTH, ENDURANCE, AND MOBILITY: ICD-10-CM

## 2023-06-01 PROCEDURE — 97110 THERAPEUTIC EXERCISES: CPT | Mod: PN

## 2023-06-01 PROCEDURE — 97112 NEUROMUSCULAR REEDUCATION: CPT | Mod: PN

## 2023-06-01 NOTE — PROGRESS NOTES
[facility]   Physical Therapy Treatment Note    Name: Marie Lejeune  Clinic Number: [id number]    Therapy Diagnosis:   Encounter Diagnoses   Name Primary?    Balance problem Yes    Decreased strength, endurance, and mobility     Abnormality of gait        Physician: Veena Joshua MD    Visit Date: 6/1/2023    Physician Orders: PT Eval and Treat MS  Medical Diagnosis from Referral: MS (multiple sclerosis) [G35], Gait disturbance [R26.9]  Evaluation Date: 9/9/2022  Authorization Period Expiration: 8/22/23  Plan of Care Expiration: 12/30/22 EXTEND to 3/29/23  Visit # / Visits authorized: 17/20 (32 total)    Time In: 10:35 AM  Time Out: 11:15 AM  Total Billable Time: 40 minutes    Precautions: Standard, Fall, and Multiple Sclerosis - do not fatigue     SUBJECTIVE     Pt reports: She is doing well today, no complaints.  Been keeping up with her exercises at home.     She was compliant with home exercise program.  Response to previous treatment: no adverse response  Functional change: ongoing     Pain: 0/10  Location: NA    Fatigue: 0/10     OBJECTIVE     Objective Measures updated at progress report unless specified.     Treatment     Maryann received the treatments listed below:      therapeutic exercises to develop strength, endurance, ROM, posture, and core stabilization for 10 minutes including:    SciFit recumbent stepper level 2 x10 min with BUE/BLE - no fatigue reported    Neuro re education for 30 min    B UE assist on stairs.    On foam pad:  Step ups onto 4 in step 2x10  Alternating step taps to 4 in x 10 ea LE  Squats on foam pad x 10     NBOS, firm, eyes open 2x30 seconds ea  WBOS, firm, eyes closed 2x30 seconds ea    Forward step over pool noodle  with anterior weight shift x 10 each side, with upper extremity assist  Lateral step over pool noodles with lateral weight shift x 10     Seated rest breaks after ea activity.    Ambulated with patient into and out of clinic mod  Independent with rollator     Patient Education and Home Exercises     Home Exercises Provided and Patient Education Provided     Education provided:   - role of PT, plan of care (POC), scheduling     Written Home Exercises Provided: Patient instructed to cont prior HEP.  Exercises were reviewed and Maryann was able to demonstrate them prior to the end of the session.  Maryann demonstrated fair  understanding of the education provided.      See EMR under Patient Instructions for exercises provided at future visits and at previous bout of therapy visits.    ASSESSMENT     Maryann tolerated session well with no adverse response noted. Pt requiring seated rest breaks following ea activity to prevent over fatigue. Pt with appropriate challenge noted to all prescribed activities. Pt with most difficulty balancing with vision eliminated. Pt remains appropriate for therapy at this time.     Maryann Is progressing well towards her goals.   Pt prognosis is Good.     Pt will continue to benefit from skilled outpatient physical therapy to address the deficits listed in the problem list box on initial evaluation, provide pt/family education and to maximize pt's level of independence in the home and community environment.     Pt's spiritual, cultural and educational needs considered and pt agreeable to plan of care and goals.     Anticipated barriers to physical therapy: disease progression, scheduling    Goals: as of 11/17/22  Short Term Goals: 4 weeks   1. Pt will perform all bed mobility tasks with CGA or better so that they can improve trunk control and decrease caregiver burden. - improving  2. Patient will complete stand pivot transfer with CGA and appropriate assistive device with good body mechanics to promote safe functional transfers. - MET 10/14/22  3. Patient will transfer sit to/from standing with CGA and appropriate assistive device with good body mechanics to promote safe functional transfers. - MET 10/14/22  4. Patient  to tolerate performance of 5 times sit to stand to demonstrate improvement in lower extremity endurance. - MET 10/14/22  5. Patient to tolerate MCTSIB testing/completion of testing to track balance and improvements in static balance. - MET 10/14/22  6. Patient to perform Timed Up and Go at 47 seconds demonstrating improvement in functional mobility. - MET 10/14/22     Long Term Goals: 8 weeks   1. Pt will perform all bed mobility tasks with SPV or better so that they can improve trunk control and decrease caregiver burden. - improving  2. Patient will be SBA with home exercise program (HEP) to promote continued rehabilitation following discharge.  3. Patient will complete stand pivot transfer with SBA and appropriate assistive device with good body mechanics to promote safe functional transfers. - improving  4. Patient will transfer sit to/from standing with SBA and appropriate assistive device with good body mechanics to promote safe functional transfers. - improving  6. Patient to perform Timed Up and Go at 41 seconds demonstrating improvement in functional mobility. - MET 11/17/22  7. NEW: Patient to perform 5xSTS at 22 seconds demonstrating improvement in functional strength. - improving   8. NEW: Patient to perform gait speed at 0.4 m/s demonstrating improved safety with household ambulation.    - improving   9. NEW: Patient to perform MCTSIB at 15+ seconds for all 4 conditions to improve safety with static standing for in home tasks.  - improving   10. NEW: Pt to perform TUG score in < / = 25 seconds in order to improve functional mobility and safety.  11. NEW: Pt to improve 6 minute walk test distance by > / = meters in order to improve functional mobility and independence.        PLAN   Current Plan of care Certification: 12/30/22 EXTEND to 3/29/23     Current POC: Continue at 1x/wk x 12 weeks with focus on Neuro-Reeducation, There-Ex, There-Act, Patient Education, Gait Training, Self Care. Continue to  progress balance and strengthening while monitoring fatigue.     Continue B LE strength and static/dynamic balance challenges as tolerated.     Elizabeth Peterson, PT, DPT  06/06/2023

## 2023-06-14 NOTE — PATIENT INSTRUCTIONS
"OK to adjust Provigil dose-1/2-1 tablet up to twice daily(am and noon) if needed. Encourage "drug holiday"     Please ask PCP/ortho regarding tylenol/ibuprofen use for right should pain instead of pain med  " Detail Level: Detailed

## 2023-06-15 ENCOUNTER — CLINICAL SUPPORT (OUTPATIENT)
Dept: REHABILITATION | Facility: HOSPITAL | Age: 73
End: 2023-06-15
Payer: MEDICARE

## 2023-06-15 DIAGNOSIS — Z74.09 DECREASED STRENGTH, ENDURANCE, AND MOBILITY: ICD-10-CM

## 2023-06-15 DIAGNOSIS — R68.89 DECREASED STRENGTH, ENDURANCE, AND MOBILITY: ICD-10-CM

## 2023-06-15 DIAGNOSIS — R26.89 BALANCE PROBLEM: Primary | ICD-10-CM

## 2023-06-15 DIAGNOSIS — R26.9 ABNORMALITY OF GAIT: ICD-10-CM

## 2023-06-15 DIAGNOSIS — R53.1 DECREASED STRENGTH, ENDURANCE, AND MOBILITY: ICD-10-CM

## 2023-06-15 PROCEDURE — 97110 THERAPEUTIC EXERCISES: CPT | Mod: PN

## 2023-06-15 PROCEDURE — 97112 NEUROMUSCULAR REEDUCATION: CPT | Mod: PN

## 2023-06-15 NOTE — PROGRESS NOTES
[facility]   Physical Therapy Treatment Note    Name: Marie Lejeune  Clinic Number: [id number]    Therapy Diagnosis:   Encounter Diagnoses   Name Primary?    Balance problem Yes    Decreased strength, endurance, and mobility     Abnormality of gait          Physician: Veena Joshua MD    Visit Date: 6/15/2023    Physician Orders: PT Eval and Treat MS  Medical Diagnosis from Referral: MS (multiple sclerosis) [G35], Gait disturbance [R26.9]  Evaluation Date: 9/9/2022  Authorization Period Expiration: 8/22/23  Plan of Care Expiration: 12/30/22 EXTEND to 3/29/23  Visit # / Visits authorized: 18/20 (33 total)    Time In: 10:30 AM  Time Out: 11:10 AM  Total Billable Time: 40 minutes    Precautions: Standard, Fall, and Multiple Sclerosis - do not fatigue     SUBJECTIVE     Pt reports: She is doing well today, no complaints.  Been keeping up with her exercises at home.     She was compliant with home exercise program.  Response to previous treatment: no adverse response  Functional change: ongoing     Pain: 0/10  Location: NA    Fatigue: 0/10     OBJECTIVE     Objective Measures updated at progress report unless specified.     Treatment     Maryann received the treatments listed below:      therapeutic exercises to develop strength, endurance, ROM, posture, and core stabilization for 10 minutes including:    SciFit recumbent stepper level 2 x 10 min with BUE/BLE - no fatigue reported    Neuro re education for 30 min    B UE assist on stairs.    On foam pad:  Step ups onto 4 in step 2x10  Alternating step taps to 4 in x 10 ea LE  Squats on foam pad x 10     NBOS, firm, eyes open 2x30 seconds ea  WBOS, firm, eyes closed 2x30 seconds ea    Forward step over pool noodle  with anterior weight shift x 10 each side, with upper extremity assist  Lateral step over pool noodles with lateral weight shift x 10     Seated rest breaks after ea activity.    Ambulated with patient into and out of clinic mod  Independent with rollator     Patient Education and Home Exercises     Home Exercises Provided and Patient Education Provided     Education provided:   - role of PT, plan of care (POC), scheduling     Written Home Exercises Provided: Patient instructed to cont prior HEP.  Exercises were reviewed and Maryann was able to demonstrate them prior to the end of the session.  Maryann demonstrated fair  understanding of the education provided.      See EMR under Patient Instructions for exercises provided at future visits and at previous bout of therapy visits.    ASSESSMENT     Maryann tolerated session well with no adverse response noted. Pt requiring seated rest breaks following ea activity to prevent over fatigue. Pt with appropriate challenge noted to all prescribed activities. Pt with most difficulty balancing with vision eliminated. Pt remains appropriate for therapy at this time.     Maryann Is progressing well towards her goals.   Pt prognosis is Good.     Pt will continue to benefit from skilled outpatient physical therapy to address the deficits listed in the problem list box on initial evaluation, provide pt/family education and to maximize pt's level of independence in the home and community environment.     Pt's spiritual, cultural and educational needs considered and pt agreeable to plan of care and goals.     Anticipated barriers to physical therapy: disease progression, scheduling    Goals: as of 11/17/22  Short Term Goals: 4 weeks   1. Pt will perform all bed mobility tasks with CGA or better so that they can improve trunk control and decrease caregiver burden. - improving  2. Patient will complete stand pivot transfer with CGA and appropriate assistive device with good body mechanics to promote safe functional transfers. - MET 10/14/22  3. Patient will transfer sit to/from standing with CGA and appropriate assistive device with good body mechanics to promote safe functional transfers. - MET 10/14/22  4. Patient  to tolerate performance of 5 times sit to stand to demonstrate improvement in lower extremity endurance. - MET 10/14/22  5. Patient to tolerate MCTSIB testing/completion of testing to track balance and improvements in static balance. - MET 10/14/22  6. Patient to perform Timed Up and Go at 47 seconds demonstrating improvement in functional mobility. - MET 10/14/22     Long Term Goals: 8 weeks   1. Pt will perform all bed mobility tasks with SPV or better so that they can improve trunk control and decrease caregiver burden. - improving  2. Patient will be SBA with home exercise program (HEP) to promote continued rehabilitation following discharge.  3. Patient will complete stand pivot transfer with SBA and appropriate assistive device with good body mechanics to promote safe functional transfers. - improving  4. Patient will transfer sit to/from standing with SBA and appropriate assistive device with good body mechanics to promote safe functional transfers. - improving  6. Patient to perform Timed Up and Go at 41 seconds demonstrating improvement in functional mobility. - MET 11/17/22  7. NEW: Patient to perform 5xSTS at 22 seconds demonstrating improvement in functional strength. - improving   8. NEW: Patient to perform gait speed at 0.4 m/s demonstrating improved safety with household ambulation.    - improving   9. NEW: Patient to perform MCTSIB at 15+ seconds for all 4 conditions to improve safety with static standing for in home tasks.  - improving   10. NEW: Pt to perform TUG score in < / = 25 seconds in order to improve functional mobility and safety.  11. NEW: Pt to improve 6 minute walk test distance by > / = meters in order to improve functional mobility and independence.        PLAN   Current Plan of care Certification: 12/30/22 EXTEND to 3/29/23     Current POC: Continue at 1x/wk x 12 weeks with focus on Neuro-Reeducation, There-Ex, There-Act, Patient Education, Gait Training, Self Care. Continue to  progress balance and strengthening while monitoring fatigue.     Continue B LE strength and static/dynamic balance challenges as tolerated.     Elizabeth Peterson, PT, DPT  06/15/2023

## 2023-06-22 ENCOUNTER — CLINICAL SUPPORT (OUTPATIENT)
Dept: REHABILITATION | Facility: HOSPITAL | Age: 73
End: 2023-06-22
Payer: MEDICARE

## 2023-06-22 DIAGNOSIS — Z74.09 DECREASED STRENGTH, ENDURANCE, AND MOBILITY: ICD-10-CM

## 2023-06-22 DIAGNOSIS — R26.9 ABNORMALITY OF GAIT: ICD-10-CM

## 2023-06-22 DIAGNOSIS — R68.89 DECREASED STRENGTH, ENDURANCE, AND MOBILITY: ICD-10-CM

## 2023-06-22 DIAGNOSIS — R53.1 DECREASED STRENGTH, ENDURANCE, AND MOBILITY: ICD-10-CM

## 2023-06-22 DIAGNOSIS — R26.89 BALANCE PROBLEM: Primary | ICD-10-CM

## 2023-06-22 PROCEDURE — 97110 THERAPEUTIC EXERCISES: CPT | Mod: PN

## 2023-06-22 PROCEDURE — 97112 NEUROMUSCULAR REEDUCATION: CPT | Mod: PN

## 2023-06-22 NOTE — PROGRESS NOTES
[facility]   Physical Therapy Treatment Note    Name: Marie Lejeune  Clinic Number: [id number]    Therapy Diagnosis:   Encounter Diagnoses   Name Primary?    Balance problem Yes    Decreased strength, endurance, and mobility     Abnormality of gait      Physician: Veena Joshua MD    Visit Date: 6/22/2023    Physician Orders: PT Eval and Treat MS  Medical Diagnosis from Referral: MS (multiple sclerosis) [G35], Gait disturbance [R26.9]  Evaluation Date: 9/9/2022  Authorization Period Expiration: 8/22/23  Plan of Care Expiration: 12/30/22 EXTEND to 3/29/23  Visit # / Visits authorized: 19/20 (34 total)    Time In: 9:00 AM  Time Out: 9:45 AM  Total Billable Time: 45 minutes    Precautions: Standard, Fall, and Multiple Sclerosis - do not fatigue     SUBJECTIVE     Pt reports: She is doing well today, no complaints.  Been keeping up with her exercises at home.     She was compliant with home exercise program.  Response to previous treatment: no adverse response  Functional change: ongoing     Pain: 0/10  Location: NA    Fatigue: 0/10     OBJECTIVE     Objective Measures updated at progress report unless specified.     Treatment     Maryann received the treatments listed below:      therapeutic exercises to develop strength, endurance, ROM, posture, and core stabilization for 10 minutes including:    SciFit recumbent stepper level +2.5 x 10 min with BUE/BLE - no fatigue reported    Neuro re education for 30 min    B UE assist on stairs.    On foam pad:  Step ups onto 4 in step 2x10  Alternating step taps to 4 in 2x10 ea LE  Squats on foam pad 2x10     NBOS, firm, eyes open 2x30 seconds ea  WBOS, firm, eyes closed 2x30 seconds ea  WBOS, foam, eyes open 2x30 seconds ea     Forward step over pool noodle  with anterior weight shift x 10 each side, with upper extremity assist  Lateral step over pool noodles with lateral weight shift x 10     Seated rest breaks after ea activity.    Ambulated with  patient into and out of clinic mod Independent with rollator     Patient Education and Home Exercises     Home Exercises Provided and Patient Education Provided     Education provided:   - role of PT, plan of care (POC), scheduling     Written Home Exercises Provided: Patient instructed to cont prior HEP.  Exercises were reviewed and Maryann was able to demonstrate them prior to the end of the session.  Maryann demonstrated fair  understanding of the education provided.      See EMR under Patient Instructions for exercises provided at future visits and at previous bout of therapy visits.    ASSESSMENT     Maryann tolerated session well with no adverse response noted. Pt requiring seated rest breaks following ea activity to prevent over fatigue. Pt with appropriate challenge noted to all prescribed activities. Pt with most difficulty balancing with vision eliminated. Pt remains appropriate for therapy at this time.     Maryann Is progressing well towards her goals.   Pt prognosis is Good.     Pt will continue to benefit from skilled outpatient physical therapy to address the deficits listed in the problem list box on initial evaluation, provide pt/family education and to maximize pt's level of independence in the home and community environment.     Pt's spiritual, cultural and educational needs considered and pt agreeable to plan of care and goals.     Anticipated barriers to physical therapy: disease progression, scheduling    Goals: as of 11/17/22  Short Term Goals: 4 weeks   1. Pt will perform all bed mobility tasks with CGA or better so that they can improve trunk control and decrease caregiver burden. - improving  2. Patient will complete stand pivot transfer with CGA and appropriate assistive device with good body mechanics to promote safe functional transfers. - MET 10/14/22  3. Patient will transfer sit to/from standing with CGA and appropriate assistive device with good body mechanics to promote safe functional  transfers. - MET 10/14/22  4. Patient to tolerate performance of 5 times sit to stand to demonstrate improvement in lower extremity endurance. - MET 10/14/22  5. Patient to tolerate MCTSIB testing/completion of testing to track balance and improvements in static balance. - MET 10/14/22  6. Patient to perform Timed Up and Go at 47 seconds demonstrating improvement in functional mobility. - MET 10/14/22     Long Term Goals: 8 weeks   1. Pt will perform all bed mobility tasks with SPV or better so that they can improve trunk control and decrease caregiver burden. - improving  2. Patient will be SBA with home exercise program (HEP) to promote continued rehabilitation following discharge.  3. Patient will complete stand pivot transfer with SBA and appropriate assistive device with good body mechanics to promote safe functional transfers. - improving  4. Patient will transfer sit to/from standing with SBA and appropriate assistive device with good body mechanics to promote safe functional transfers. - improving  6. Patient to perform Timed Up and Go at 41 seconds demonstrating improvement in functional mobility. - MET 11/17/22  7. NEW: Patient to perform 5xSTS at 22 seconds demonstrating improvement in functional strength. - improving   8. NEW: Patient to perform gait speed at 0.4 m/s demonstrating improved safety with household ambulation.    - improving   9. NEW: Patient to perform MCTSIB at 15+ seconds for all 4 conditions to improve safety with static standing for in home tasks.  - improving   10. NEW: Pt to perform TUG score in < / = 25 seconds in order to improve functional mobility and safety.  11. NEW: Pt to improve 6 minute walk test distance by > / = meters in order to improve functional mobility and independence.        PLAN   Current Plan of care Certification: 12/30/22 EXTEND to 3/29/23     Current POC: Continue at 1x/wk x 12 weeks with focus on Neuro-Reeducation, There-Ex, There-Act, Patient Education, Gait  Training, Self Care. Continue to progress balance and strengthening while monitoring fatigue.     Continue B LE strength and static/dynamic balance challenges as tolerated.     Elizabeth Peterson, PT, DPT  06/22/2023

## 2023-07-07 ENCOUNTER — CLINICAL SUPPORT (OUTPATIENT)
Dept: REHABILITATION | Facility: HOSPITAL | Age: 73
End: 2023-07-07
Payer: MEDICARE

## 2023-07-07 DIAGNOSIS — R53.1 DECREASED STRENGTH, ENDURANCE, AND MOBILITY: ICD-10-CM

## 2023-07-07 DIAGNOSIS — R26.89 BALANCE PROBLEM: Primary | ICD-10-CM

## 2023-07-07 DIAGNOSIS — Z74.09 DECREASED STRENGTH, ENDURANCE, AND MOBILITY: ICD-10-CM

## 2023-07-07 DIAGNOSIS — R26.9 ABNORMALITY OF GAIT: ICD-10-CM

## 2023-07-07 DIAGNOSIS — R68.89 DECREASED STRENGTH, ENDURANCE, AND MOBILITY: ICD-10-CM

## 2023-07-07 PROCEDURE — 97112 NEUROMUSCULAR REEDUCATION: CPT | Mod: PN

## 2023-07-07 PROCEDURE — 97110 THERAPEUTIC EXERCISES: CPT | Mod: PN

## 2023-07-07 NOTE — PROGRESS NOTES
[facility]   Physical Therapy Treatment Note    Name: Marie Lejeune  Clinic Number: [id number]    Therapy Diagnosis:   Encounter Diagnoses   Name Primary?    Balance problem Yes    Decreased strength, endurance, and mobility     Abnormality of gait        Physician: Veena Joshua MD    Visit Date: 7/7/2023    Physician Orders: PT Eval and Treat MS  Medical Diagnosis from Referral: MS (multiple sclerosis) [G35], Gait disturbance [R26.9]  Evaluation Date: 9/9/2022  Authorization Period Expiration: 8/22/23  Plan of Care Expiration: 12/30/22 EXTEND to 3/29/23  Visit # / Visits authorized: 20/20 (35 total)    Time In: 11:20 AM  Time Out: 12:00 PM  Total Billable Time: 40 minutes    Precautions: Standard, Fall, and Multiple Sclerosis - do not fatigue     SUBJECTIVE     Pt reports: She is doing well today, no complaints.  Been keeping up with her exercises at home.     She was compliant with home exercise program.  Response to previous treatment: no adverse response  Functional change: ongoing     Pain: 0/10  Location: NA    Fatigue: 0/10     OBJECTIVE     Objective Measures updated at progress report unless specified.     Treatment     Maryann received the treatments listed below:      therapeutic exercises to develop strength, endurance, ROM, posture, and core stabilization for 10 minutes including:    SciFit recumbent stepper level +2.5 x 10 min with BUE/BLE - no fatigue reported    Neuro re education for 30 min    B UE assist on stairs.    On foam pad:  Step ups onto 4 in step 2x10  Alternating step taps to 4 in 2x10 ea LE  Squats on foam pad 2x10     NBOS, firm, eyes open 2x30 seconds ea  WBOS, firm, eyes closed 2x30 seconds ea  WBOS, foam, eyes open 2x30 seconds ea     Forward step over pool noodle  with anterior weight shift x 10 each side, with upper extremity assist  Lateral step over pool noodles with lateral weight shift x 10     Seated rest breaks after ea activity.    Ambulated  with patient into and out of clinic mod Independent with rollator     Patient Education and Home Exercises     Home Exercises Provided and Patient Education Provided     Education provided:   - role of PT, plan of care (POC), scheduling     Written Home Exercises Provided: Patient instructed to cont prior HEP.  Exercises were reviewed and Maryann was able to demonstrate them prior to the end of the session.  Maryann demonstrated fair  understanding of the education provided.      See EMR under Patient Instructions for exercises provided at future visits and at previous bout of therapy visits.    ASSESSMENT     Maryann tolerated session well with no adverse response noted. Pt requiring seated rest breaks following ea activity to prevent over fatigue. Pt with appropriate challenge noted to all prescribed activities. Pt with most difficulty balancing with vision eliminated. Pt remains appropriate for therapy at this time.     Maryann Is progressing well towards her goals.   Pt prognosis is Good.     Pt will continue to benefit from skilled outpatient physical therapy to address the deficits listed in the problem list box on initial evaluation, provide pt/family education and to maximize pt's level of independence in the home and community environment.     Pt's spiritual, cultural and educational needs considered and pt agreeable to plan of care and goals.     Anticipated barriers to physical therapy: disease progression, scheduling    Goals: as of 11/17/22  Short Term Goals: 4 weeks   1. Pt will perform all bed mobility tasks with CGA or better so that they can improve trunk control and decrease caregiver burden. - improving  2. Patient will complete stand pivot transfer with CGA and appropriate assistive device with good body mechanics to promote safe functional transfers. - MET 10/14/22  3. Patient will transfer sit to/from standing with CGA and appropriate assistive device with good body mechanics to promote safe functional  transfers. - MET 10/14/22  4. Patient to tolerate performance of 5 times sit to stand to demonstrate improvement in lower extremity endurance. - MET 10/14/22  5. Patient to tolerate MCTSIB testing/completion of testing to track balance and improvements in static balance. - MET 10/14/22  6. Patient to perform Timed Up and Go at 47 seconds demonstrating improvement in functional mobility. - MET 10/14/22     Long Term Goals: 8 weeks   1. Pt will perform all bed mobility tasks with SPV or better so that they can improve trunk control and decrease caregiver burden. - improving  2. Patient will be SBA with home exercise program (HEP) to promote continued rehabilitation following discharge.  3. Patient will complete stand pivot transfer with SBA and appropriate assistive device with good body mechanics to promote safe functional transfers. - improving  4. Patient will transfer sit to/from standing with SBA and appropriate assistive device with good body mechanics to promote safe functional transfers. - improving  6. Patient to perform Timed Up and Go at 41 seconds demonstrating improvement in functional mobility. - MET 11/17/22  7. NEW: Patient to perform 5xSTS at 22 seconds demonstrating improvement in functional strength. - improving   8. NEW: Patient to perform gait speed at 0.4 m/s demonstrating improved safety with household ambulation.    - improving   9. NEW: Patient to perform MCTSIB at 15+ seconds for all 4 conditions to improve safety with static standing for in home tasks.  - improving   10. NEW: Pt to perform TUG score in < / = 25 seconds in order to improve functional mobility and safety.  11. NEW: Pt to improve 6 minute walk test distance by > / = meters in order to improve functional mobility and independence.        PLAN   Current Plan of care Certification: 12/30/22 EXTEND to 3/29/23     Current POC: Continue at 1x/wk x 12 weeks with focus on Neuro-Reeducation, There-Ex, There-Act, Patient Education, Gait  Training, Self Care. Continue to progress balance and strengthening while monitoring fatigue.     Continue B LE strength and static/dynamic balance challenges as tolerated.     Elizabeth Peterson, PT, DPT  07/13/2023

## 2023-07-13 ENCOUNTER — CLINICAL SUPPORT (OUTPATIENT)
Dept: REHABILITATION | Facility: HOSPITAL | Age: 73
End: 2023-07-13
Payer: MEDICARE

## 2023-07-13 DIAGNOSIS — Z74.09 DECREASED STRENGTH, ENDURANCE, AND MOBILITY: ICD-10-CM

## 2023-07-13 DIAGNOSIS — R68.89 DECREASED STRENGTH, ENDURANCE, AND MOBILITY: ICD-10-CM

## 2023-07-13 DIAGNOSIS — R53.1 DECREASED STRENGTH, ENDURANCE, AND MOBILITY: ICD-10-CM

## 2023-07-13 DIAGNOSIS — R26.89 BALANCE PROBLEM: Primary | ICD-10-CM

## 2023-07-13 DIAGNOSIS — R26.9 ABNORMALITY OF GAIT: ICD-10-CM

## 2023-07-13 PROCEDURE — 97112 NEUROMUSCULAR REEDUCATION: CPT | Mod: KX,PN

## 2023-07-13 PROCEDURE — 97110 THERAPEUTIC EXERCISES: CPT | Mod: KX,PN

## 2023-07-13 NOTE — PROGRESS NOTES
[facility]   Physical Therapy Treatment Note    Name: Marie Lejeune  Clinic Number: [id number]    Therapy Diagnosis:   Encounter Diagnoses   Name Primary?    Balance problem Yes    Decreased strength, endurance, and mobility     Abnormality of gait        Physician: Veena Joshua MD    Visit Date: 7/13/2023    Physician Orders: PT Eval and Treat MS  Medical Diagnosis from Referral: MS (multiple sclerosis) [G35], Gait disturbance [R26.9]  Evaluation Date: 9/9/2022  Authorization Period Expiration: 8/22/23  Plan of Care Expiration: 12/30/22 EXTEND to 3/29/23  Visit # / Visits authorized: 22/30 (36 total)    Time In: 10:35 AM  Time Out: 11:15 AM  Total Billable Time: 40 minutes    Precautions: Standard, Fall, and Multiple Sclerosis - do not fatigue     SUBJECTIVE     Pt reports: She is doing well today, no complaints.  Been keeping up with her exercises at home.     She was compliant with home exercise program.  Response to previous treatment: no adverse response  Functional change: ongoing     Pain: 0/10  Location: NA    Fatigue: 0/10     OBJECTIVE     Objective Measures updated at progress report unless specified.     Treatment     Maryann received the treatments listed below:      therapeutic exercises to develop strength, endurance, ROM, posture, and core stabilization for 10 minutes including:    SciFit recumbent stepper level +2.5 x 10 min with BUE/BLE - no fatigue reported    Neuro re education for 30 min    B UE assist on stairs.    On foam pad:  Step ups onto 4 in step 2x10  Alternating step taps to 4 in 2x10 ea LE  Squats on foam pad 2x10     NBOS, firm, eyes open 2x30 seconds ea  WBOS, firm, eyes closed 2x30 seconds ea  WBOS, foam, eyes open 2x30 seconds ea     Forward step over pool noodle  with anterior weight shift x 10 each side, with upper extremity assist  Lateral step over pool noodles with lateral weight shift x 10     Seated rest breaks after ea activity.    Ambulated  with patient into and out of clinic mod Independent with rollator     Patient Education and Home Exercises     Home Exercises Provided and Patient Education Provided     Education provided:   - role of PT, plan of care (POC), scheduling     Written Home Exercises Provided: Patient instructed to cont prior HEP.  Exercises were reviewed and Maryann was able to demonstrate them prior to the end of the session.  Maryann demonstrated fair  understanding of the education provided.      See EMR under Patient Instructions for exercises provided at future visits and at previous bout of therapy visits.    ASSESSMENT     Maryann tolerated session well with no adverse response noted. Pt requiring seated rest breaks following ea activity to prevent over fatigue. Pt with appropriate challenge noted to all prescribed activities. Pt with most difficulty balancing with vision eliminated. Pt remains appropriate for therapy at this time.     Maryann Is progressing well towards her goals.   Pt prognosis is Good.     Pt will continue to benefit from skilled outpatient physical therapy to address the deficits listed in the problem list box on initial evaluation, provide pt/family education and to maximize pt's level of independence in the home and community environment.     Pt's spiritual, cultural and educational needs considered and pt agreeable to plan of care and goals.     Anticipated barriers to physical therapy: disease progression, scheduling    Goals: as of 11/17/22  Short Term Goals: 4 weeks   1. Pt will perform all bed mobility tasks with CGA or better so that they can improve trunk control and decrease caregiver burden. - improving  2. Patient will complete stand pivot transfer with CGA and appropriate assistive device with good body mechanics to promote safe functional transfers. - MET 10/14/22  3. Patient will transfer sit to/from standing with CGA and appropriate assistive device with good body mechanics to promote safe functional  transfers. - MET 10/14/22  4. Patient to tolerate performance of 5 times sit to stand to demonstrate improvement in lower extremity endurance. - MET 10/14/22  5. Patient to tolerate MCTSIB testing/completion of testing to track balance and improvements in static balance. - MET 10/14/22  6. Patient to perform Timed Up and Go at 47 seconds demonstrating improvement in functional mobility. - MET 10/14/22     Long Term Goals: 8 weeks   1. Pt will perform all bed mobility tasks with SPV or better so that they can improve trunk control and decrease caregiver burden. - improving  2. Patient will be SBA with home exercise program (HEP) to promote continued rehabilitation following discharge.  3. Patient will complete stand pivot transfer with SBA and appropriate assistive device with good body mechanics to promote safe functional transfers. - improving  4. Patient will transfer sit to/from standing with SBA and appropriate assistive device with good body mechanics to promote safe functional transfers. - improving  6. Patient to perform Timed Up and Go at 41 seconds demonstrating improvement in functional mobility. - MET 11/17/22  7. NEW: Patient to perform 5xSTS at 22 seconds demonstrating improvement in functional strength. - improving   8. NEW: Patient to perform gait speed at 0.4 m/s demonstrating improved safety with household ambulation.    - improving   9. NEW: Patient to perform MCTSIB at 15+ seconds for all 4 conditions to improve safety with static standing for in home tasks.  - improving   10. NEW: Pt to perform TUG score in < / = 25 seconds in order to improve functional mobility and safety.  11. NEW: Pt to improve 6 minute walk test distance by > / = meters in order to improve functional mobility and independence.        PLAN   Current Plan of care Certification: 12/30/22 EXTEND to 3/29/23     Current POC: Continue at 1x/wk x 12 weeks with focus on Neuro-Reeducation, There-Ex, There-Act, Patient Education, Gait  Training, Self Care. Continue to progress balance and strengthening while monitoring fatigue.     Continue B LE strength and static/dynamic balance challenges as tolerated.     Elizabeth Peterson, PT, DPT  07/13/2023

## 2023-07-20 ENCOUNTER — CLINICAL SUPPORT (OUTPATIENT)
Dept: REHABILITATION | Facility: HOSPITAL | Age: 73
End: 2023-07-20
Payer: MEDICARE

## 2023-07-20 DIAGNOSIS — R53.1 DECREASED STRENGTH, ENDURANCE, AND MOBILITY: ICD-10-CM

## 2023-07-20 DIAGNOSIS — R68.89 DECREASED STRENGTH, ENDURANCE, AND MOBILITY: ICD-10-CM

## 2023-07-20 DIAGNOSIS — Z74.09 DECREASED STRENGTH, ENDURANCE, AND MOBILITY: ICD-10-CM

## 2023-07-20 DIAGNOSIS — R26.9 ABNORMALITY OF GAIT: ICD-10-CM

## 2023-07-20 DIAGNOSIS — R26.89 BALANCE PROBLEM: Primary | ICD-10-CM

## 2023-07-20 PROCEDURE — 97110 THERAPEUTIC EXERCISES: CPT | Mod: KX,PN

## 2023-07-20 PROCEDURE — 97112 NEUROMUSCULAR REEDUCATION: CPT | Mod: KX,PN

## 2023-07-20 NOTE — PROGRESS NOTES
[facility]   Physical Therapy Treatment Note    Name: Marie Lejeune  Clinic Number: [id number]    Therapy Diagnosis:   Encounter Diagnoses   Name Primary?    Balance problem Yes    Decreased strength, endurance, and mobility     Abnormality of gait        Physician: Veena Joshua MD    Visit Date: 7/20/2023    Physician Orders: PT Eval and Treat MS  Medical Diagnosis from Referral: MS (multiple sclerosis) [G35], Gait disturbance [R26.9]  Evaluation Date: 9/9/2022  Authorization Period Expiration: 8/22/23  Plan of Care Expiration: 12/30/22 EXTEND to 3/29/23  Visit # / Visits authorized: 22/30 (37 total)    Time In: 10:35 AM  Time Out: 11:15 AM  Total Billable Time: 40 minutes    Precautions: Standard, Fall, and Multiple Sclerosis - do not fatigue     SUBJECTIVE     Pt reports: She is doing well today, no complaints.  Been keeping up with her exercises at home.     She was compliant with home exercise program.  Response to previous treatment: no adverse response  Functional change: ongoing     Pain: 0/10  Location: NA    Fatigue: 0/10     OBJECTIVE     Objective Measures updated at progress report unless specified.     Treatment     Maryann received the treatments listed below:      therapeutic exercises to develop strength, endurance, ROM, posture, and core stabilization for 10 minutes including:    SciFit recumbent stepper level +2.5 x 10 min with BUE/BLE - no fatigue reported    Neuro re education for 30 min    B UE assist on stairs.    On foam pad:  Step ups onto 4 in step 2x10  Alternating step taps to 4 in 2x10 ea LE  Squats on foam pad 2x10     NBOS, firm, eyes open 2x30 seconds ea  WBOS, firm, eyes closed 2x30 seconds ea  WBOS, foam, eyes open 2x30 seconds ea     Forward step over pool noodle  with anterior weight shift x 10 each side, with upper extremity assist  Lateral step over pool noodles with lateral weight shift x 10     Seated rest breaks after ea activity.    Ambulated  with patient into and out of clinic mod Independent with rollator     Patient Education and Home Exercises     Home Exercises Provided and Patient Education Provided     Education provided:   - role of PT, plan of care (POC), scheduling     Written Home Exercises Provided: Patient instructed to cont prior HEP.  Exercises were reviewed and Maryann was able to demonstrate them prior to the end of the session.  Maryann demonstrated fair  understanding of the education provided.      See EMR under Patient Instructions for exercises provided at future visits and at previous bout of therapy visits.    ASSESSMENT     Maryann tolerated session well with no adverse response noted. Pt requiring seated rest breaks following ea activity to prevent over fatigue. Pt with appropriate challenge noted to all prescribed activities. Pt with most difficulty balancing with vision eliminated. Pt remains appropriate for therapy at this time.     Maryann Is progressing well towards her goals.   Pt prognosis is Good.     Pt will continue to benefit from skilled outpatient physical therapy to address the deficits listed in the problem list box on initial evaluation, provide pt/family education and to maximize pt's level of independence in the home and community environment.     Pt's spiritual, cultural and educational needs considered and pt agreeable to plan of care and goals.     Anticipated barriers to physical therapy: disease progression, scheduling    Goals: as of 11/17/22  Short Term Goals: 4 weeks   1. Pt will perform all bed mobility tasks with CGA or better so that they can improve trunk control and decrease caregiver burden. - improving  2. Patient will complete stand pivot transfer with CGA and appropriate assistive device with good body mechanics to promote safe functional transfers. - MET 10/14/22  3. Patient will transfer sit to/from standing with CGA and appropriate assistive device with good body mechanics to promote safe functional  transfers. - MET 10/14/22  4. Patient to tolerate performance of 5 times sit to stand to demonstrate improvement in lower extremity endurance. - MET 10/14/22  5. Patient to tolerate MCTSIB testing/completion of testing to track balance and improvements in static balance. - MET 10/14/22  6. Patient to perform Timed Up and Go at 47 seconds demonstrating improvement in functional mobility. - MET 10/14/22     Long Term Goals: 8 weeks   1. Pt will perform all bed mobility tasks with SPV or better so that they can improve trunk control and decrease caregiver burden. - improving  2. Patient will be SBA with home exercise program (HEP) to promote continued rehabilitation following discharge.  3. Patient will complete stand pivot transfer with SBA and appropriate assistive device with good body mechanics to promote safe functional transfers. - improving  4. Patient will transfer sit to/from standing with SBA and appropriate assistive device with good body mechanics to promote safe functional transfers. - improving  6. Patient to perform Timed Up and Go at 41 seconds demonstrating improvement in functional mobility. - MET 11/17/22  7. NEW: Patient to perform 5xSTS at 22 seconds demonstrating improvement in functional strength. - improving   8. NEW: Patient to perform gait speed at 0.4 m/s demonstrating improved safety with household ambulation.    - improving   9. NEW: Patient to perform MCTSIB at 15+ seconds for all 4 conditions to improve safety with static standing for in home tasks.  - improving   10. NEW: Pt to perform TUG score in < / = 25 seconds in order to improve functional mobility and safety.  11. NEW: Pt to improve 6 minute walk test distance by > / = meters in order to improve functional mobility and independence.        PLAN   Current Plan of care Certification: 12/30/22 EXTEND to 3/29/23     Current POC: Continue at 1x/wk x 12 weeks with focus on Neuro-Reeducation, There-Ex, There-Act, Patient Education, Gait  Training, Self Care. Continue to progress balance and strengthening while monitoring fatigue.     Continue B LE strength and static/dynamic balance challenges as tolerated.     Elizabeth Peterson, PT, DPT  07/25/2023

## 2023-07-21 ENCOUNTER — PATIENT MESSAGE (OUTPATIENT)
Dept: PSYCHIATRY | Facility: CLINIC | Age: 73
End: 2023-07-21
Payer: MEDICARE

## 2023-07-28 ENCOUNTER — CLINICAL SUPPORT (OUTPATIENT)
Dept: REHABILITATION | Facility: HOSPITAL | Age: 73
End: 2023-07-28
Payer: MEDICARE

## 2023-07-28 DIAGNOSIS — R26.89 BALANCE PROBLEM: Primary | ICD-10-CM

## 2023-07-28 DIAGNOSIS — Z74.09 DECREASED STRENGTH, ENDURANCE, AND MOBILITY: ICD-10-CM

## 2023-07-28 DIAGNOSIS — R53.1 DECREASED STRENGTH, ENDURANCE, AND MOBILITY: ICD-10-CM

## 2023-07-28 DIAGNOSIS — R26.9 ABNORMALITY OF GAIT: ICD-10-CM

## 2023-07-28 DIAGNOSIS — R68.89 DECREASED STRENGTH, ENDURANCE, AND MOBILITY: ICD-10-CM

## 2023-07-28 PROCEDURE — 97112 NEUROMUSCULAR REEDUCATION: CPT | Mod: KX,PN

## 2023-07-28 PROCEDURE — 97110 THERAPEUTIC EXERCISES: CPT | Mod: KX,PN

## 2023-07-28 NOTE — PROGRESS NOTES
[facility]   Physical Therapy Treatment Note    Name: Marie Lejeune  Clinic Number: [id number]    Therapy Diagnosis:   Encounter Diagnoses   Name Primary?    Balance problem Yes    Decreased strength, endurance, and mobility     Abnormality of gait      Physician: Veena Joshua MD    Visit Date: 7/28/2023    Physician Orders: PT Eval and Treat MS  Medical Diagnosis from Referral: MS (multiple sclerosis) [G35], Gait disturbance [R26.9]  Evaluation Date: 9/9/2022  Authorization Period Expiration: 8/22/23  Plan of Care Expiration: 12/30/22 EXTEND to 3/29/23  Visit # / Visits authorized: 23/30 (38 total) - KX Modifier    Time In: 12:00 PM  Time Out: 12:40 PM  Total Billable Time: 40 minutes    Precautions: Standard, Fall, and Multiple Sclerosis - do not fatigue     SUBJECTIVE     Pt reports: She is doing well today, no complaints.  Been keeping up with her exercises at home.     She was compliant with home exercise program.  Response to previous treatment: no adverse response  Functional change: ongoing     Pain: 0/10  Location: NA    Fatigue: 0/10     OBJECTIVE     Objective Measures updated at progress report unless specified.     Treatment     Maryann received the treatments listed below:      therapeutic exercises to develop strength, endurance, ROM, posture, and core stabilization for 10 minutes including:    SciFit recumbent stepper level +2.5 x 10 min with BUE/BLE - no fatigue reported    Neuro re education for 30 min    B UE assist on stairs.    On foam pad:  Step ups onto 4 in step 2x10  Alternating step taps to 4 in 2x10 ea LE  Squats on foam pad 2x10     NBOS, firm, eyes open 2x30 seconds ea  WBOS, firm, eyes closed 2x30 seconds ea  WBOS, foam, eyes open 2x30 seconds ea     Seated rest breaks after ea activity.    Ambulated with patient into and out of clinic mod Independent with rollator     Patient Education and Home Exercises     Home Exercises Provided and Patient Education  Provided     Education provided:   - role of PT, plan of care (POC), scheduling     Written Home Exercises Provided: Patient instructed to cont prior HEP.  Exercises were reviewed and Maryann was able to demonstrate them prior to the end of the session.  Maryann demonstrated fair  understanding of the education provided.      See EMR under Patient Instructions for exercises provided at future visits and at previous bout of therapy visits.    ASSESSMENT     Maryann tolerated session well with no adverse response noted. Pt requiring seated rest breaks following ea activity to prevent over fatigue. Pt with appropriate challenge noted to all prescribed activities. Pt with most difficulty balancing with vision eliminated. Pt remains appropriate for therapy at this time.     Maryann Is progressing well towards her goals.   Pt prognosis is Good.     Pt will continue to benefit from skilled outpatient physical therapy to address the deficits listed in the problem list box on initial evaluation, provide pt/family education and to maximize pt's level of independence in the home and community environment.     Pt's spiritual, cultural and educational needs considered and pt agreeable to plan of care and goals.     Anticipated barriers to physical therapy: disease progression, scheduling    Goals: as of 11/17/22  Short Term Goals: 4 weeks   1. Pt will perform all bed mobility tasks with CGA or better so that they can improve trunk control and decrease caregiver burden. - improving  2. Patient will complete stand pivot transfer with CGA and appropriate assistive device with good body mechanics to promote safe functional transfers. - MET 10/14/22  3. Patient will transfer sit to/from standing with CGA and appropriate assistive device with good body mechanics to promote safe functional transfers. - MET 10/14/22  4. Patient to tolerate performance of 5 times sit to stand to demonstrate improvement in lower extremity endurance. - MET  10/14/22  5. Patient to tolerate MCTSIB testing/completion of testing to track balance and improvements in static balance. - MET 10/14/22  6. Patient to perform Timed Up and Go at 47 seconds demonstrating improvement in functional mobility. - MET 10/14/22     Long Term Goals: 8 weeks   1. Pt will perform all bed mobility tasks with SPV or better so that they can improve trunk control and decrease caregiver burden. - improving  2. Patient will be SBA with home exercise program (HEP) to promote continued rehabilitation following discharge.  3. Patient will complete stand pivot transfer with SBA and appropriate assistive device with good body mechanics to promote safe functional transfers. - improving  4. Patient will transfer sit to/from standing with SBA and appropriate assistive device with good body mechanics to promote safe functional transfers. - improving  6. Patient to perform Timed Up and Go at 41 seconds demonstrating improvement in functional mobility. - MET 11/17/22  7. NEW: Patient to perform 5xSTS at 22 seconds demonstrating improvement in functional strength. - improving   8. NEW: Patient to perform gait speed at 0.4 m/s demonstrating improved safety with household ambulation.    - improving   9. NEW: Patient to perform MCTSIB at 15+ seconds for all 4 conditions to improve safety with static standing for in home tasks.  - improving   10. NEW: Pt to perform TUG score in < / = 25 seconds in order to improve functional mobility and safety.  11. NEW: Pt to improve 6 minute walk test distance by > / = meters in order to improve functional mobility and independence.        PLAN   Current Plan of care Certification: 12/30/22 EXTEND to 3/29/23     Current POC: Continue at 1x/wk x 12 weeks with focus on Neuro-Reeducation, There-Ex, There-Act, Patient Education, Gait Training, Self Care. Continue to progress balance and strengthening while monitoring fatigue.     Continue B LE strength and static/dynamic balance  challenges as tolerated.     Elizabeth Peterson, PT, DPT  07/31/2023

## 2023-08-10 ENCOUNTER — CLINICAL SUPPORT (OUTPATIENT)
Dept: REHABILITATION | Facility: HOSPITAL | Age: 73
End: 2023-08-10
Payer: MEDICARE

## 2023-08-10 DIAGNOSIS — R26.9 ABNORMALITY OF GAIT: ICD-10-CM

## 2023-08-10 DIAGNOSIS — R68.89 DECREASED STRENGTH, ENDURANCE, AND MOBILITY: ICD-10-CM

## 2023-08-10 DIAGNOSIS — R53.1 DECREASED STRENGTH, ENDURANCE, AND MOBILITY: ICD-10-CM

## 2023-08-10 DIAGNOSIS — R26.89 BALANCE PROBLEM: Primary | ICD-10-CM

## 2023-08-10 DIAGNOSIS — Z74.09 DECREASED STRENGTH, ENDURANCE, AND MOBILITY: ICD-10-CM

## 2023-08-10 PROCEDURE — 97110 THERAPEUTIC EXERCISES: CPT | Mod: PN

## 2023-08-10 PROCEDURE — 97112 NEUROMUSCULAR REEDUCATION: CPT | Mod: PN

## 2023-08-17 ENCOUNTER — CLINICAL SUPPORT (OUTPATIENT)
Dept: REHABILITATION | Facility: HOSPITAL | Age: 73
End: 2023-08-17
Payer: MEDICARE

## 2023-08-17 DIAGNOSIS — R53.1 DECREASED STRENGTH, ENDURANCE, AND MOBILITY: ICD-10-CM

## 2023-08-17 DIAGNOSIS — Z74.09 DECREASED STRENGTH, ENDURANCE, AND MOBILITY: ICD-10-CM

## 2023-08-17 DIAGNOSIS — R26.89 BALANCE PROBLEM: Primary | ICD-10-CM

## 2023-08-17 DIAGNOSIS — R26.9 ABNORMALITY OF GAIT: ICD-10-CM

## 2023-08-17 DIAGNOSIS — R68.89 DECREASED STRENGTH, ENDURANCE, AND MOBILITY: ICD-10-CM

## 2023-08-17 PROCEDURE — 97110 THERAPEUTIC EXERCISES: CPT | Mod: KX,PN

## 2023-08-17 PROCEDURE — 97112 NEUROMUSCULAR REEDUCATION: CPT | Mod: KX,PN

## 2023-08-17 NOTE — PROGRESS NOTES
[facility]   Physical Therapy Treatment Note    Name: Marie Lejeune  Clinic Number: [id number]    Therapy Diagnosis:   Encounter Diagnoses   Name Primary?    Balance problem Yes    Decreased strength, endurance, and mobility     Abnormality of gait        Physician: Veena Joshua MD    Visit Date: 8/17/2023    Physician Orders: PT Eval and Treat MS  Medical Diagnosis from Referral: MS (multiple sclerosis) [G35], Gait disturbance [R26.9]  Evaluation Date: 9/9/2022  Authorization Period Expiration: 8/22/23  Plan of Care Expiration: 12/30/22 EXTEND to 3/29/23  Visit # / Visits authorized: 25/30 (40 total) - KX Modifier    Time In: 10:35 AM  Time Out: 11:20 AM  Total Billable Time: 45 minutes    Precautions: Standard, Fall, and Multiple Sclerosis - do not fatigue     SUBJECTIVE     Pt reports: She is doing well today, no complaints. Been keeping up with her exercises at home.     She was compliant with home exercise program.  Response to previous treatment: no adverse response  Functional change: ongoing     Pain: 0/10  Location: NA    Fatigue: 0/10     OBJECTIVE     Objective Measures updated at progress report unless specified.     Treatment     Maryann received the treatments listed below:      therapeutic exercises to develop strength, endurance, ROM, posture, and core stabilization for 10 minutes including:    SciFit recumbent stepper level +2.5 x 10 min with BUE/BLE - no fatigue reported    Neuro re education for 30 min    B UE assist on stairs.    On foam pad:  Step ups onto 4 in step 2x10  Alternating step taps to 4 in 2x10 ea LE  Squats on foam pad 2x10     NBOS, firm, eyes open 2x30 seconds ea  WBOS, firm, eyes closed 2x30 seconds ea  WBOS, foam, eyes open 2x30 seconds ea     Stepping forward/backward over noodle x 10 ea LE  Stepping laterally over noodle x 10 ea LE    Seated rest breaks after ea activity.    Ambulated with patient into and out of clinic mod Independent with  rollator     Patient Education and Home Exercises     Home Exercises Provided and Patient Education Provided     Education provided:   - role of PT, plan of care (POC), scheduling     Written Home Exercises Provided: Patient instructed to cont prior HEP.  Exercises were reviewed and Maryann was able to demonstrate them prior to the end of the session.  Maryann demonstrated fair  understanding of the education provided.      See EMR under Patient Instructions for exercises provided at future visits and at previous bout of therapy visits.    ASSESSMENT     Maryann tolerated session well with no adverse response noted. Pt requiring seated rest breaks following ea activity to prevent over fatigue. Pt with appropriate challenge noted to all prescribed activities. Pt with most difficulty balancing with vision eliminated. Pt remains appropriate for therapy at this time.     Maryann Is progressing well towards her goals.   Pt prognosis is Good.     Pt will continue to benefit from skilled outpatient physical therapy to address the deficits listed in the problem list box on initial evaluation, provide pt/family education and to maximize pt's level of independence in the home and community environment.     Pt's spiritual, cultural and educational needs considered and pt agreeable to plan of care and goals.     Anticipated barriers to physical therapy: disease progression, scheduling    Goals: as of 11/17/22  Short Term Goals: 4 weeks   1. Pt will perform all bed mobility tasks with CGA or better so that they can improve trunk control and decrease caregiver burden. - improving  2. Patient will complete stand pivot transfer with CGA and appropriate assistive device with good body mechanics to promote safe functional transfers. - MET 10/14/22  3. Patient will transfer sit to/from standing with CGA and appropriate assistive device with good body mechanics to promote safe functional transfers. - MET 10/14/22  4. Patient to tolerate  performance of 5 times sit to stand to demonstrate improvement in lower extremity endurance. - MET 10/14/22  5. Patient to tolerate MCTSIB testing/completion of testing to track balance and improvements in static balance. - MET 10/14/22  6. Patient to perform Timed Up and Go at 47 seconds demonstrating improvement in functional mobility. - MET 10/14/22     Long Term Goals: 8 weeks   1. Pt will perform all bed mobility tasks with SPV or better so that they can improve trunk control and decrease caregiver burden. - improving  2. Patient will be SBA with home exercise program (HEP) to promote continued rehabilitation following discharge.  3. Patient will complete stand pivot transfer with SBA and appropriate assistive device with good body mechanics to promote safe functional transfers. - improving  4. Patient will transfer sit to/from standing with SBA and appropriate assistive device with good body mechanics to promote safe functional transfers. - improving  6. Patient to perform Timed Up and Go at 41 seconds demonstrating improvement in functional mobility. - MET 11/17/22  7. NEW: Patient to perform 5xSTS at 22 seconds demonstrating improvement in functional strength. - improving   8. NEW: Patient to perform gait speed at 0.4 m/s demonstrating improved safety with household ambulation.    - improving   9. NEW: Patient to perform MCTSIB at 15+ seconds for all 4 conditions to improve safety with static standing for in home tasks.  - improving   10. NEW: Pt to perform TUG score in < / = 25 seconds in order to improve functional mobility and safety.  11. NEW: Pt to improve 6 minute walk test distance by > / = meters in order to improve functional mobility and independence.        PLAN   Current Plan of care Certification: 12/30/22 EXTEND to 3/29/23     Current POC: Continue at 1x/wk x 12 weeks with focus on Neuro-Reeducation, There-Ex, There-Act, Patient Education, Gait Training, Self Care. Continue to progress balance  and strengthening while monitoring fatigue.     Continue B LE strength and static/dynamic balance challenges as tolerated.     Elizabeth Peterson, PT, DPT  08/17/2023

## 2023-08-24 ENCOUNTER — CLINICAL SUPPORT (OUTPATIENT)
Dept: REHABILITATION | Facility: HOSPITAL | Age: 73
End: 2023-08-24
Payer: MEDICARE

## 2023-08-24 DIAGNOSIS — R26.89 BALANCE PROBLEM: Primary | ICD-10-CM

## 2023-08-24 DIAGNOSIS — R68.89 DECREASED STRENGTH, ENDURANCE, AND MOBILITY: ICD-10-CM

## 2023-08-24 DIAGNOSIS — R26.9 ABNORMALITY OF GAIT: ICD-10-CM

## 2023-08-24 DIAGNOSIS — R53.1 DECREASED STRENGTH, ENDURANCE, AND MOBILITY: ICD-10-CM

## 2023-08-24 DIAGNOSIS — Z74.09 DECREASED STRENGTH, ENDURANCE, AND MOBILITY: ICD-10-CM

## 2023-08-24 PROCEDURE — 97110 THERAPEUTIC EXERCISES: CPT | Mod: KX,PN

## 2023-08-24 PROCEDURE — 97112 NEUROMUSCULAR REEDUCATION: CPT | Mod: KX,PN

## 2023-08-31 NOTE — PROGRESS NOTES
[facility]   Physical Therapy Treatment Note    Name: Marie Lejeune  Clinic Number: [id number]    Therapy Diagnosis:   Encounter Diagnoses   Name Primary?    Balance problem Yes    Decreased strength, endurance, and mobility     Abnormality of gait        Physician: Veena Joshua MD    Visit Date: 8/24/2023    Physician Orders: PT Eval and Treat MS  Medical Diagnosis from Referral: MS (multiple sclerosis) [G35], Gait disturbance [R26.9]  Evaluation Date: 9/9/2022  Authorization Period Expiration: 8/22/23  Plan of Care Expiration: 12/30/22 EXTEND to 3/29/23  Visit # / Visits authorized: 26/30 (41 total) - KX Modifier    Time In: 10:35 AM  Time Out: 11:15 AM  Total Billable Time: 40 minutes    Precautions: Standard, Fall, and Multiple Sclerosis - do not fatigue     SUBJECTIVE     Pt reports: She is doing well today, no complaints. Been keeping up with her exercises at home.     She was compliant with home exercise program.  Response to previous treatment: no adverse response  Functional change: ongoing     Pain: 0/10  Location: NA    Fatigue: 0/10     OBJECTIVE     Objective Measures updated at progress report unless specified.     Treatment     Maryann received the treatments listed below:      therapeutic exercises to develop strength, endurance, ROM, posture, and core stabilization for 10 minutes including:    SciFit recumbent stepper level +2.5 x 10 min with BUE/BLE - no fatigue reported    Neuro re education for 30 min    B UE assist on stairs.    On foam pad:  Step ups onto 4 in step 2x10  Alternating step taps to 4 in 2x10 ea LE  Squats on foam pad 2x10     NBOS, firm, eyes open 2x30 seconds ea  WBOS, firm, eyes closed 2x30 seconds ea  WBOS, foam, eyes open 2x30 seconds ea     Stepping forward/backward over noodle x 10 ea LE  Stepping laterally over noodle x 10 ea LE    Seated rest breaks after ea activity.    Ambulated with patient into and out of clinic mod Independent with  rollator     Patient Education and Home Exercises     Home Exercises Provided and Patient Education Provided     Education provided:   - role of PT, plan of care (POC), scheduling     Written Home Exercises Provided: Patient instructed to cont prior HEP.  Exercises were reviewed and Maryann was able to demonstrate them prior to the end of the session.  Maryann demonstrated fair  understanding of the education provided.      See EMR under Patient Instructions for exercises provided at future visits and at previous bout of therapy visits.    ASSESSMENT     Maryann tolerated session well with no adverse response noted. Pt requiring seated rest breaks following ea activity to prevent over fatigue. Pt with appropriate challenge noted to all prescribed activities. Pt with most difficulty balancing with vision eliminated. Pt remains appropriate for therapy at this time.     Maryann Is progressing well towards her goals.   Pt prognosis is Good.     Pt will continue to benefit from skilled outpatient physical therapy to address the deficits listed in the problem list box on initial evaluation, provide pt/family education and to maximize pt's level of independence in the home and community environment.     Pt's spiritual, cultural and educational needs considered and pt agreeable to plan of care and goals.     Anticipated barriers to physical therapy: disease progression, scheduling    Goals: as of 11/17/22  Short Term Goals: 4 weeks   1. Pt will perform all bed mobility tasks with CGA or better so that they can improve trunk control and decrease caregiver burden. - improving  2. Patient will complete stand pivot transfer with CGA and appropriate assistive device with good body mechanics to promote safe functional transfers. - MET 10/14/22  3. Patient will transfer sit to/from standing with CGA and appropriate assistive device with good body mechanics to promote safe functional transfers. - MET 10/14/22  4. Patient to tolerate  performance of 5 times sit to stand to demonstrate improvement in lower extremity endurance. - MET 10/14/22  5. Patient to tolerate MCTSIB testing/completion of testing to track balance and improvements in static balance. - MET 10/14/22  6. Patient to perform Timed Up and Go at 47 seconds demonstrating improvement in functional mobility. - MET 10/14/22     Long Term Goals: 8 weeks   1. Pt will perform all bed mobility tasks with SPV or better so that they can improve trunk control and decrease caregiver burden. - improving  2. Patient will be SBA with home exercise program (HEP) to promote continued rehabilitation following discharge.  3. Patient will complete stand pivot transfer with SBA and appropriate assistive device with good body mechanics to promote safe functional transfers. - improving  4. Patient will transfer sit to/from standing with SBA and appropriate assistive device with good body mechanics to promote safe functional transfers. - improving  6. Patient to perform Timed Up and Go at 41 seconds demonstrating improvement in functional mobility. - MET 11/17/22  7. NEW: Patient to perform 5xSTS at 22 seconds demonstrating improvement in functional strength. - improving   8. NEW: Patient to perform gait speed at 0.4 m/s demonstrating improved safety with household ambulation.    - improving   9. NEW: Patient to perform MCTSIB at 15+ seconds for all 4 conditions to improve safety with static standing for in home tasks.  - improving   10. NEW: Pt to perform TUG score in < / = 25 seconds in order to improve functional mobility and safety.  11. NEW: Pt to improve 6 minute walk test distance by > / = meters in order to improve functional mobility and independence.        PLAN   Current Plan of care Certification: 12/30/22 EXTEND to 3/29/23     Current POC: Continue at 1x/wk x 12 weeks with focus on Neuro-Reeducation, There-Ex, There-Act, Patient Education, Gait Training, Self Care. Continue to progress balance  and strengthening while monitoring fatigue.     Continue B LE strength and static/dynamic balance challenges as tolerated.     Elizabeth Peterson, PT, DPT  08/31/2023

## 2023-09-07 ENCOUNTER — CLINICAL SUPPORT (OUTPATIENT)
Dept: REHABILITATION | Facility: HOSPITAL | Age: 73
End: 2023-09-07
Payer: MEDICARE

## 2023-09-07 DIAGNOSIS — Z74.09 DECREASED STRENGTH, ENDURANCE, AND MOBILITY: ICD-10-CM

## 2023-09-07 DIAGNOSIS — R68.89 DECREASED STRENGTH, ENDURANCE, AND MOBILITY: ICD-10-CM

## 2023-09-07 DIAGNOSIS — R53.1 DECREASED STRENGTH, ENDURANCE, AND MOBILITY: ICD-10-CM

## 2023-09-07 DIAGNOSIS — R26.9 ABNORMALITY OF GAIT: ICD-10-CM

## 2023-09-07 DIAGNOSIS — R26.89 BALANCE PROBLEM: Primary | ICD-10-CM

## 2023-09-07 PROCEDURE — 97112 NEUROMUSCULAR REEDUCATION: CPT | Mod: KX,PN

## 2023-09-07 NOTE — PROGRESS NOTES
[facility]   Physical Therapy Treatment Note    Name: Marie Lejeune  Clinic Number: [id number]    Therapy Diagnosis:   Encounter Diagnoses   Name Primary?    Balance problem Yes    Decreased strength, endurance, and mobility     Abnormality of gait        Physician: Veena Joshua MD    Visit Date: 9/7/2023    Physician Orders: PT Eval and Treat MS  Medical Diagnosis from Referral: MS (multiple sclerosis) [G35], Gait disturbance [R26.9]  Evaluation Date: 9/9/2022  Authorization Period Expiration: 8/22/23  Plan of Care Expiration: 12/30/22 EXTEND to 3/29/23  Visit # / Visits authorized: 27/30 (42 total) - KX Modifier    Time In: 10:35 AM  Time Out: 11:15 AM  Total Billable Time: 30 minutes (1 on 1 time with PT)    Precautions: Standard, Fall, and Multiple Sclerosis - do not fatigue     SUBJECTIVE     Pt reports: She is doing well today, no complaints. Been keeping up with her exercises at home.     She was compliant with home exercise program.  Response to previous treatment: no adverse response  Functional change: ongoing     Pain: 0/10  Location: NA    Fatigue: 0/10     OBJECTIVE     Objective Measures updated at progress report unless specified.     Treatment     Maryann received the treatments listed below:      therapeutic exercises to develop strength, endurance, ROM, posture, and core stabilization for 10 minutes including:    SciFit recumbent stepper level +2.5 x 10 min with BUE/BLE - no fatigue reported    Neuro re education for 30 min    B UE assist on stairs.    On foam pad:  Step ups onto 4 in step 2x10  Alternating step taps to 4 in 2x10 ea LE  Squats on foam pad 2x10     NBOS, firm, eyes open 2x30 seconds ea  WBOS, firm, eyes closed 2x30 seconds ea  WBOS, foam, eyes open 2x30 seconds ea     Stepping forward/backward over noodle x 10 ea LE  Stepping laterally over noodle x 10 ea LE    Seated rest breaks after ea activity.    Ambulated with patient into and out of clinic mod  Independent with rollator     Patient Education and Home Exercises     Home Exercises Provided and Patient Education Provided     Education provided:   - role of PT, plan of care (POC), scheduling     Written Home Exercises Provided: Patient instructed to cont prior HEP.  Exercises were reviewed and Maryann was able to demonstrate them prior to the end of the session.  Maryann demonstrated fair  understanding of the education provided.      See EMR under Patient Instructions for exercises provided at future visits and at previous bout of therapy visits.    ASSESSMENT     Maryann tolerated session well with no adverse response noted. Pt requiring seated rest breaks following ea activity to prevent over fatigue. Pt with appropriate challenge noted to all prescribed activities. Pt with most difficulty balancing with vision eliminated. Pt remains appropriate for therapy at this time.     Maryann Is progressing well towards her goals.   Pt prognosis is Good.     Pt will continue to benefit from skilled outpatient physical therapy to address the deficits listed in the problem list box on initial evaluation, provide pt/family education and to maximize pt's level of independence in the home and community environment.     Pt's spiritual, cultural and educational needs considered and pt agreeable to plan of care and goals.     Anticipated barriers to physical therapy: disease progression, scheduling    Goals: as of 11/17/22  Short Term Goals: 4 weeks   1. Pt will perform all bed mobility tasks with CGA or better so that they can improve trunk control and decrease caregiver burden. - improving  2. Patient will complete stand pivot transfer with CGA and appropriate assistive device with good body mechanics to promote safe functional transfers. - MET 10/14/22  3. Patient will transfer sit to/from standing with CGA and appropriate assistive device with good body mechanics to promote safe functional transfers. - MET 10/14/22  4. Patient  to tolerate performance of 5 times sit to stand to demonstrate improvement in lower extremity endurance. - MET 10/14/22  5. Patient to tolerate MCTSIB testing/completion of testing to track balance and improvements in static balance. - MET 10/14/22  6. Patient to perform Timed Up and Go at 47 seconds demonstrating improvement in functional mobility. - MET 10/14/22     Long Term Goals: 8 weeks   1. Pt will perform all bed mobility tasks with SPV or better so that they can improve trunk control and decrease caregiver burden. - improving  2. Patient will be SBA with home exercise program (HEP) to promote continued rehabilitation following discharge.  3. Patient will complete stand pivot transfer with SBA and appropriate assistive device with good body mechanics to promote safe functional transfers. - improving  4. Patient will transfer sit to/from standing with SBA and appropriate assistive device with good body mechanics to promote safe functional transfers. - improving  6. Patient to perform Timed Up and Go at 41 seconds demonstrating improvement in functional mobility. - MET 11/17/22  7. NEW: Patient to perform 5xSTS at 22 seconds demonstrating improvement in functional strength. - improving   8. NEW: Patient to perform gait speed at 0.4 m/s demonstrating improved safety with household ambulation.    - improving   9. NEW: Patient to perform MCTSIB at 15+ seconds for all 4 conditions to improve safety with static standing for in home tasks.  - improving   10. NEW: Pt to perform TUG score in < / = 25 seconds in order to improve functional mobility and safety.  11. NEW: Pt to improve 6 minute walk test distance by > / = meters in order to improve functional mobility and independence.        PLAN   Current Plan of care Certification: 12/30/22 EXTEND to 3/29/23     Current POC: Continue at 1x/wk x 12 weeks with focus on Neuro-Reeducation, There-Ex, There-Act, Patient Education, Gait Training, Self Care. Continue to  progress balance and strengthening while monitoring fatigue.     Continue B LE strength and static/dynamic balance challenges as tolerated.     Elizabeth Peterson, PT, DPT  09/28/2023

## 2023-09-13 ENCOUNTER — CLINICAL SUPPORT (OUTPATIENT)
Dept: REHABILITATION | Facility: HOSPITAL | Age: 73
End: 2023-09-13
Payer: MEDICARE

## 2023-09-13 DIAGNOSIS — R53.1 DECREASED STRENGTH, ENDURANCE, AND MOBILITY: ICD-10-CM

## 2023-09-13 DIAGNOSIS — Z74.09 DECREASED STRENGTH, ENDURANCE, AND MOBILITY: ICD-10-CM

## 2023-09-13 DIAGNOSIS — R26.89 BALANCE PROBLEM: Primary | ICD-10-CM

## 2023-09-13 DIAGNOSIS — R68.89 DECREASED STRENGTH, ENDURANCE, AND MOBILITY: ICD-10-CM

## 2023-09-13 DIAGNOSIS — R26.9 ABNORMALITY OF GAIT: ICD-10-CM

## 2023-09-13 PROCEDURE — 97112 NEUROMUSCULAR REEDUCATION: CPT | Mod: KX,PN

## 2023-09-13 PROCEDURE — 97110 THERAPEUTIC EXERCISES: CPT | Mod: KX,PN

## 2023-09-22 ENCOUNTER — CLINICAL SUPPORT (OUTPATIENT)
Dept: REHABILITATION | Facility: HOSPITAL | Age: 73
End: 2023-09-22
Payer: MEDICARE

## 2023-09-22 DIAGNOSIS — R26.9 ABNORMALITY OF GAIT: ICD-10-CM

## 2023-09-22 DIAGNOSIS — Z74.09 DECREASED STRENGTH, ENDURANCE, AND MOBILITY: ICD-10-CM

## 2023-09-22 DIAGNOSIS — R68.89 DECREASED STRENGTH, ENDURANCE, AND MOBILITY: ICD-10-CM

## 2023-09-22 DIAGNOSIS — R26.89 BALANCE PROBLEM: Primary | ICD-10-CM

## 2023-09-22 DIAGNOSIS — R53.1 DECREASED STRENGTH, ENDURANCE, AND MOBILITY: ICD-10-CM

## 2023-09-22 PROCEDURE — 97110 THERAPEUTIC EXERCISES: CPT | Mod: KX,PN

## 2023-09-22 PROCEDURE — 97112 NEUROMUSCULAR REEDUCATION: CPT | Mod: KX,PN

## 2023-09-25 DIAGNOSIS — G35 MULTIPLE SCLEROSIS: ICD-10-CM

## 2023-09-25 RX ORDER — GLATIRAMER ACETATE 40 MG/ML
INJECTION, SOLUTION SUBCUTANEOUS
Qty: 36 ML | Refills: 1 | Status: SHIPPED | OUTPATIENT
Start: 2023-09-25 | End: 2024-03-13

## 2023-09-28 ENCOUNTER — CLINICAL SUPPORT (OUTPATIENT)
Dept: REHABILITATION | Facility: HOSPITAL | Age: 73
End: 2023-09-28
Payer: MEDICARE

## 2023-09-28 DIAGNOSIS — R26.89 BALANCE PROBLEM: Primary | ICD-10-CM

## 2023-09-28 DIAGNOSIS — R26.9 ABNORMALITY OF GAIT: ICD-10-CM

## 2023-09-28 DIAGNOSIS — Z74.09 DECREASED STRENGTH, ENDURANCE, AND MOBILITY: ICD-10-CM

## 2023-09-28 DIAGNOSIS — R68.89 DECREASED STRENGTH, ENDURANCE, AND MOBILITY: ICD-10-CM

## 2023-09-28 DIAGNOSIS — R53.1 DECREASED STRENGTH, ENDURANCE, AND MOBILITY: ICD-10-CM

## 2023-09-28 PROCEDURE — 97112 NEUROMUSCULAR REEDUCATION: CPT | Mod: KX,PN

## 2023-09-28 PROCEDURE — 97110 THERAPEUTIC EXERCISES: CPT | Mod: KX,PN

## 2023-09-28 NOTE — PROGRESS NOTES
[facility]   Physical Therapy Treatment and Discharge Note    Name: Marie Lejeune  Clinic Number: [id number]    Therapy Diagnosis:   Encounter Diagnoses   Name Primary?    Balance problem Yes    Decreased strength, endurance, and mobility     Abnormality of gait        Physician: Veena Joshua MD    Visit Date: 9/28/2023    Physician Orders: PT Eval and Treat MS  Medical Diagnosis from Referral: MS (multiple sclerosis) [G35], Gait disturbance [R26.9]  Evaluation Date: 9/9/2022  Authorization Period Expiration: 8/22/23  Plan of Care Expiration: 12/30/22 EXTEND to 3/29/23  Visit # / Visits authorized: 30/30 (45 total) - KX Modifier    Time In: 11:20 AM  Time Out: 12:00 PM  Total Billable Time: 40 minutes    Precautions: Standard, Fall, and Multiple Sclerosis - do not fatigue     SUBJECTIVE     Pt reports: She is doing well today, no complaints. Been keeping up with her exercises at home.     She was compliant with home exercise program.  Response to previous treatment: no adverse response  Functional change: ongoing     Pain: 0/10  Location: NA    Fatigue: 0/10     OBJECTIVE     Update:     Evaluation 10/14/22 11/17/22 12/29/22 9/28/23   5 times sit-stand NT  >12 sec= fall risk for general elderly  >16 sec= fall risk for Parkinson's disease  >10 sec= balance/vestibular dysfunction (<59 y/o)  >14.2 sec= balance/vestibular dysfunction (>59 y/o)  >12 sec= fall risk for CVA 28 seconds - no upper extremity  24 seconds - no upper extremity        16 seconds - no upper extremity  25 seconds  - no  UE support    Hopper/ FGA/ Tinetti NT NT NT NT NT   Timed Up and Go 53 sec  < 20 sec safe for independent transfers,      < 30 sec assist required for transfers 42 seconds - rollator 33 seconds - rollator  29.6 seconds - rollator 25 seconds    6 meter walk test 0.11 m/s 0.26 m/s  0.31 m/s     7/10 fatigue  0.27 m/s 0.31 m/s    6 min walk test NT NT 2 min - 100 ft  4 min - 195 ft / 60 m     6/10 fatigue   2 min - 117   4 min - 212  6 min - 300 ft  7/10 fatigue 6 min - 296       Postural control:  MCTSIB:  1. Eyes Open/feet together/Firm: NT seconds --> 30 seconds --> 30 seconds --> 30 seconds  2. Eyes Closed/feet together/Firm: NT seconds --> 5 seconds --> 5 seconds --> 30 seconds   3. Eyes Open/feet together/Foam: NT seconds --> 30 seconds --> 5 seconds --> 30 seconds  4. Eyes Closed/feet together/Foam: NT seconds --> 5 seconds --> NT --> unable     Gait Assessment:   - AD used: rollator   - Assistance: SBA - Sosa   - Distance: 50 feet      GAIT DEVIATIONS:  Gait component performance: decreased speed, decreased step length and stride length, increased step width, increased weightshift from left to right without trunk rotation or hip rotation; bilateral lower extremities with external rotation, no toe off or heel strike; rollator height increased and arms extended at 90 degrees of flexion       Endurance Deficit: severe --> moderate    Treatment     Maryann received the treatments listed below:      therapeutic exercises to develop strength, endurance, ROM, posture, and core stabilization for 15 minutes including:    SciFit recumbent stepper level +2.5 x 10 min with BUE/BLE - no fatigue reported    Testing listed above    Neuro re education for 25 min    B UE assist on stairs.    On foam pad:  Step ups onto 4 in step 2x10  Alternating step taps to 4 in 2x10 ea LE  Squats on foam pad 2x10     NBOS, firm, eyes open 2x30 seconds ea  WBOS, firm, eyes closed 2x30 seconds ea  WBOS, foam, eyes open 2x30 seconds ea     Testing listed above.     Seated rest breaks after ea activity.    Ambulated with patient into and out of clinic mod Independent with rollator     Patient Education and Home Exercises     Home Exercises Provided and Patient Education Provided     Education provided:   - role of PT, plan of care (POC), scheduling     Written Home Exercises Provided: Patient instructed to cont prior HEP.  Exercises were reviewed  and Maryann was able to demonstrate them prior to the end of the session.  Maryann demonstrated fair  understanding of the education provided.      See EMR under Patient Instructions for exercises provided at future visits and at previous bout of therapy visits.    ASSESSMENT     Maryann tolerated session well with no adverse response noted. Pt progress note performed this session. Overall, pt has made significant gains with therapy. Pt has improved in B LE functional strength, functional mobility, balance, and gait speed as indicated by testing above since beginning therapy. At this time, pt appears to be maintaining functional level. Pt is independent with performance of HEP. Secondary to plateau in progress and general independence with HEP. Pt is appropriate for discharge from skilled PT services. Pt has met all STGs and 7/10 LTGs. Pt to see Neurologist in next month, educated on returning to therapy if noticing regression in function.     Goals: as of 9/28/23  Short Term Goals: 4 weeks   1. Pt will perform all bed mobility tasks with CGA or better so that they can improve trunk control and decrease caregiver burden. - MET 9/28/23  2. Patient will complete stand pivot transfer with CGA and appropriate assistive device with good body mechanics to promote safe functional transfers. - MET 10/14/22  3. Patient will transfer sit to/from standing with CGA and appropriate assistive device with good body mechanics to promote safe functional transfers. - MET 10/14/22  4. Patient to tolerate performance of 5 times sit to stand to demonstrate improvement in lower extremity endurance. - MET 10/14/22  5. Patient to tolerate MCTSIB testing/completion of testing to track balance and improvements in static balance. - MET 10/14/22  6. Patient to perform Timed Up and Go at 47 seconds demonstrating improvement in functional mobility. - MET 10/14/22     Long Term Goals: 8 weeks   1. Pt will perform all bed mobility tasks with SPV or  better so that they can improve trunk control and decrease caregiver burden. - improving  2. Patient will be SBA with home exercise program (HEP) to promote continued rehabilitation following discharge. -MET 9/28/23  3. Patient will complete stand pivot transfer with SBA and appropriate assistive device with good body mechanics to promote safe functional transfers. - MET 9/28/23  4. Patient will transfer sit to/from standing with SBA and appropriate assistive device with good body mechanics to promote safe functional transfers. - MET 9/28/23  6. Patient to perform Timed Up and Go at 41 seconds demonstrating improvement in functional mobility. - MET 11/17/22  7. NEW: Patient to perform 5xSTS at 22 seconds demonstrating improvement in functional strength. - not met   8. NEW: Patient to perform gait speed at 0.4 m/s demonstrating improved safety with household ambulation.    - not met  9. NEW: Patient to perform MCTSIB at 15+ seconds for all 4 conditions to improve safety with static standing for in home tasks.  -not met  10. NEW: Pt to perform TUG score in < / = 25 seconds in order to improve functional mobility and safety. -MET 9/28/23       PLAN     Discharge from skilled PT services.     Elizabeth Peterson, PT, DPT  10/03/2023

## 2023-09-29 NOTE — PROGRESS NOTES
[facility]   Physical Therapy Treatment Note    Name: Marie Lejeune  Clinic Number: [id number]    Therapy Diagnosis:   Encounter Diagnoses   Name Primary?    Balance problem Yes    Decreased strength, endurance, and mobility     Abnormality of gait        Physician: Veena Joshua MD    Visit Date: 9/22/2023    Physician Orders: PT Eval and Treat MS  Medical Diagnosis from Referral: MS (multiple sclerosis) [G35], Gait disturbance [R26.9]  Evaluation Date: 9/9/2022  Authorization Period Expiration: 8/22/23  Plan of Care Expiration: 12/30/22 EXTEND to 3/29/23  Visit # / Visits authorized: 29/30 (44 total) - KX Modifier    Time In: 11:20 AM  Time Out: 12:00 PM  Total Billable Time: 40 minutes (1 on 1 time with PT)    Precautions: Standard, Fall, and Multiple Sclerosis - do not fatigue     SUBJECTIVE     Pt reports: She is doing well today, no complaints. Been keeping up with her exercises at home.     She was compliant with home exercise program.  Response to previous treatment: no adverse response  Functional change: ongoing     Pain: 0/10  Location: NA    Fatigue: 0/10     OBJECTIVE     Objective Measures updated at progress report unless specified.     Treatment     Maryann received the treatments listed below:      therapeutic exercises to develop strength, endurance, ROM, posture, and core stabilization for 10 minutes including:    SciFit recumbent stepper level +2.5 x 10 min with BUE/BLE - no fatigue reported    Neuro re education for 30 min    B UE assist on stairs.    On foam pad:  Step ups onto 4 in step 2x10  Alternating step taps to 4 in 2x10 ea LE  Squats on foam pad 2x10     NBOS, firm, eyes open 2x30 seconds ea  WBOS, firm, eyes closed 2x30 seconds ea  WBOS, foam, eyes open 2x30 seconds ea     Stepping forward/backward over noodle x 10 ea LE  Stepping laterally over noodle x 10 ea LE    Seated rest breaks after ea activity.    Ambulated with patient into and out of clinic mod  Independent with rollator     Patient Education and Home Exercises     Home Exercises Provided and Patient Education Provided     Education provided:   - role of PT, plan of care (POC), scheduling     Written Home Exercises Provided: Patient instructed to cont prior HEP.  Exercises were reviewed and Maryann was able to demonstrate them prior to the end of the session.  Maryann demonstrated fair  understanding of the education provided.      See EMR under Patient Instructions for exercises provided at future visits and at previous bout of therapy visits.    ASSESSMENT     Maryann tolerated session well with no adverse response noted. Pt requiring seated rest breaks following ea activity to prevent over fatigue. Pt with appropriate challenge noted to all prescribed activities. Pt with most difficulty balancing with vision eliminated. Pt remains appropriate for therapy at this time.     Maryann Is progressing well towards her goals.   Pt prognosis is Good.     Pt will continue to benefit from skilled outpatient physical therapy to address the deficits listed in the problem list box on initial evaluation, provide pt/family education and to maximize pt's level of independence in the home and community environment.     Pt's spiritual, cultural and educational needs considered and pt agreeable to plan of care and goals.     Anticipated barriers to physical therapy: disease progression, scheduling    Goals: as of 11/17/22  Short Term Goals: 4 weeks   1. Pt will perform all bed mobility tasks with CGA or better so that they can improve trunk control and decrease caregiver burden. - improving  2. Patient will complete stand pivot transfer with CGA and appropriate assistive device with good body mechanics to promote safe functional transfers. - MET 10/14/22  3. Patient will transfer sit to/from standing with CGA and appropriate assistive device with good body mechanics to promote safe functional transfers. - MET 10/14/22  4. Patient  to tolerate performance of 5 times sit to stand to demonstrate improvement in lower extremity endurance. - MET 10/14/22  5. Patient to tolerate MCTSIB testing/completion of testing to track balance and improvements in static balance. - MET 10/14/22  6. Patient to perform Timed Up and Go at 47 seconds demonstrating improvement in functional mobility. - MET 10/14/22     Long Term Goals: 8 weeks   1. Pt will perform all bed mobility tasks with SPV or better so that they can improve trunk control and decrease caregiver burden. - improving  2. Patient will be SBA with home exercise program (HEP) to promote continued rehabilitation following discharge.  3. Patient will complete stand pivot transfer with SBA and appropriate assistive device with good body mechanics to promote safe functional transfers. - improving  4. Patient will transfer sit to/from standing with SBA and appropriate assistive device with good body mechanics to promote safe functional transfers. - improving  6. Patient to perform Timed Up and Go at 41 seconds demonstrating improvement in functional mobility. - MET 11/17/22  7. NEW: Patient to perform 5xSTS at 22 seconds demonstrating improvement in functional strength. - improving   8. NEW: Patient to perform gait speed at 0.4 m/s demonstrating improved safety with household ambulation.    - improving   9. NEW: Patient to perform MCTSIB at 15+ seconds for all 4 conditions to improve safety with static standing for in home tasks.  - improving   10. NEW: Pt to perform TUG score in < / = 25 seconds in order to improve functional mobility and safety.  11. NEW: Pt to improve 6 minute walk test distance by > / = meters in order to improve functional mobility and independence.        PLAN   Current Plan of care Certification: 12/30/22 EXTEND to 3/29/23     Current POC: Continue at 1x/wk x 12 weeks with focus on Neuro-Reeducation, There-Ex, There-Act, Patient Education, Gait Training, Self Care. Continue to  progress balance and strengthening while monitoring fatigue.     Continue B LE strength and static/dynamic balance challenges as tolerated.     Elizabeth Peterson, PT, DPT  09/28/2023

## 2023-09-29 NOTE — PROGRESS NOTES
[facility]   Physical Therapy Treatment Note    Name: Marie Lejeune  Clinic Number: [id number]    Therapy Diagnosis:   Encounter Diagnoses   Name Primary?    Balance problem Yes    Decreased strength, endurance, and mobility     Abnormality of gait        Physician: Veena Joshua MD    Visit Date: 9/13/2023    Physician Orders: PT Eval and Treat MS  Medical Diagnosis from Referral: MS (multiple sclerosis) [G35], Gait disturbance [R26.9]  Evaluation Date: 9/9/2022  Authorization Period Expiration: 8/22/23  Plan of Care Expiration: 12/30/22 EXTEND to 3/29/23  Visit # / Visits authorized: 28/30 (43 total) - KX Modifier    Time In: 1:05 PM  Time Out: 1:45 PM  Total Billable Time: 40 minutes (1 on 1 time with PT)    Precautions: Standard, Fall, and Multiple Sclerosis - do not fatigue     SUBJECTIVE     Pt reports: She is doing well today, no complaints. Been keeping up with her exercises at home.     She was compliant with home exercise program.  Response to previous treatment: no adverse response  Functional change: ongoing     Pain: 0/10  Location: NA    Fatigue: 0/10     OBJECTIVE     Objective Measures updated at progress report unless specified.     Treatment     Maryann received the treatments listed below:      therapeutic exercises to develop strength, endurance, ROM, posture, and core stabilization for 10 minutes including:    SciFit recumbent stepper level +2.5 x 10 min with BUE/BLE - no fatigue reported    Neuro re education for 30 min    B UE assist on stairs.    On foam pad:  Step ups onto 4 in step 2x10  Alternating step taps to 4 in 2x10 ea LE  Squats on foam pad 2x10     NBOS, firm, eyes open 2x30 seconds ea  WBOS, firm, eyes closed 2x30 seconds ea  WBOS, foam, eyes open 2x30 seconds ea     Stepping forward/backward over noodle x 10 ea LE  Stepping laterally over noodle x 10 ea LE    Seated rest breaks after ea activity.    Ambulated with patient into and out of clinic mod  Independent with rollator     Patient Education and Home Exercises     Home Exercises Provided and Patient Education Provided     Education provided:   - role of PT, plan of care (POC), scheduling     Written Home Exercises Provided: Patient instructed to cont prior HEP.  Exercises were reviewed and Maryann was able to demonstrate them prior to the end of the session.  Maryann demonstrated fair  understanding of the education provided.      See EMR under Patient Instructions for exercises provided at future visits and at previous bout of therapy visits.    ASSESSMENT     Maryann tolerated session well with no adverse response noted. Pt requiring seated rest breaks following ea activity to prevent over fatigue. Pt with appropriate challenge noted to all prescribed activities. Pt with most difficulty balancing with vision eliminated. Pt remains appropriate for therapy at this time.     Maryann Is progressing well towards her goals.   Pt prognosis is Good.     Pt will continue to benefit from skilled outpatient physical therapy to address the deficits listed in the problem list box on initial evaluation, provide pt/family education and to maximize pt's level of independence in the home and community environment.     Pt's spiritual, cultural and educational needs considered and pt agreeable to plan of care and goals.     Anticipated barriers to physical therapy: disease progression, scheduling    Goals: as of 11/17/22  Short Term Goals: 4 weeks   1. Pt will perform all bed mobility tasks with CGA or better so that they can improve trunk control and decrease caregiver burden. - improving  2. Patient will complete stand pivot transfer with CGA and appropriate assistive device with good body mechanics to promote safe functional transfers. - MET 10/14/22  3. Patient will transfer sit to/from standing with CGA and appropriate assistive device with good body mechanics to promote safe functional transfers. - MET 10/14/22  4. Patient  to tolerate performance of 5 times sit to stand to demonstrate improvement in lower extremity endurance. - MET 10/14/22  5. Patient to tolerate MCTSIB testing/completion of testing to track balance and improvements in static balance. - MET 10/14/22  6. Patient to perform Timed Up and Go at 47 seconds demonstrating improvement in functional mobility. - MET 10/14/22     Long Term Goals: 8 weeks   1. Pt will perform all bed mobility tasks with SPV or better so that they can improve trunk control and decrease caregiver burden. - improving  2. Patient will be SBA with home exercise program (HEP) to promote continued rehabilitation following discharge.  3. Patient will complete stand pivot transfer with SBA and appropriate assistive device with good body mechanics to promote safe functional transfers. - improving  4. Patient will transfer sit to/from standing with SBA and appropriate assistive device with good body mechanics to promote safe functional transfers. - improving  6. Patient to perform Timed Up and Go at 41 seconds demonstrating improvement in functional mobility. - MET 11/17/22  7. NEW: Patient to perform 5xSTS at 22 seconds demonstrating improvement in functional strength. - improving   8. NEW: Patient to perform gait speed at 0.4 m/s demonstrating improved safety with household ambulation.    - improving   9. NEW: Patient to perform MCTSIB at 15+ seconds for all 4 conditions to improve safety with static standing for in home tasks.  - improving   10. NEW: Pt to perform TUG score in < / = 25 seconds in order to improve functional mobility and safety.  11. NEW: Pt to improve 6 minute walk test distance by > / = meters in order to improve functional mobility and independence.        PLAN   Current Plan of care Certification: 12/30/22 EXTEND to 3/29/23     Current POC: Continue at 1x/wk x 12 weeks with focus on Neuro-Reeducation, There-Ex, There-Act, Patient Education, Gait Training, Self Care. Continue to  progress balance and strengthening while monitoring fatigue.     Continue B LE strength and static/dynamic balance challenges as tolerated.     Elizabeth Peterson, PT, DPT  09/28/2023

## 2023-10-13 ENCOUNTER — HOSPITAL ENCOUNTER (OUTPATIENT)
Dept: RADIOLOGY | Facility: HOSPITAL | Age: 73
Discharge: HOME | End: 2023-10-13
Attending: INTERNAL MEDICINE
Payer: MEDICARE

## 2023-10-13 DIAGNOSIS — Z12.31 ENCOUNTER FOR SCREENING MAMMOGRAM FOR BREAST CANCER: ICD-10-CM

## 2023-10-13 PROCEDURE — 77063 BREAST TOMOSYNTHESIS BI: CPT | Mod: 26,,, | Performed by: RADIOLOGY

## 2023-10-13 PROCEDURE — 77067 SCR MAMMO BI INCL CAD: CPT | Mod: TC

## 2023-10-13 PROCEDURE — 77067 SCR MAMMO BI INCL CAD: CPT | Mod: 26,,, | Performed by: RADIOLOGY

## 2023-10-13 PROCEDURE — 77067 MAMMO DIGITAL SCREENING BILAT WITH TOMO: ICD-10-PCS | Mod: 26,,, | Performed by: RADIOLOGY

## 2023-10-13 PROCEDURE — 77063 MAMMO DIGITAL SCREENING BILAT WITH TOMO: ICD-10-PCS | Mod: 26,,, | Performed by: RADIOLOGY

## 2023-10-14 NOTE — PROGRESS NOTES
Subjective:          Patient ID: Marie Lejeune is a 72 y.o. female who presents today for a routine clinic visit for MS.  She was last seen in April 2023. The history has been provided by the patient. He is accompanied by her .     MS HPI:  DMT: Glatiramer   Side effects from DMT? No  Taking vitamin D3 as recommended? 5000 units 5 days a week   She denies any recent infections. She has received her recent flu and COVID vaccines.   She just had a mammogram.   She is exercising at home, using her stationary bike.   She would like to return to physical therapy.   She denies any recent falls. She uses her walker around the house and when out and scooter for longer distances.     Medications:  Current Outpatient Medications   Medication Sig    amLODIPine (NORVASC) 10 MG tablet Take 10 mg by mouth once daily.    ascorbic acid, vitamin C, (VITAMIN C) 500 MG tablet Take 500 mg by mouth once daily.    atenoloL (TENORMIN) 25 MG tablet Take 25 mg by mouth 2 (two) times daily.    biotin 300 mcg Tab PATIENT NOT CERTAIN ON THE DOSAGE. TAKE ONE capsule by MOUTH daily    calcium carbonate (OS-ANISHA) 600 mg (1,500 mg) Tab Take 600 mg by mouth 2 (two) times daily with meals.    co-enzyme Q-10 30 mg capsule Take 400 mg by mouth once daily.    cyanocobalamin 500 MCG tablet Take 500 mcg by mouth once daily.    ergocalciferol (VITAMIN D2) 50,000 unit Cap Take 5,000 Units by mouth once daily.     fish oil-omega-3 fatty acids 300-1,000 mg capsule Take 1,000 g by mouth once daily.    glatiramer (COPAXONE) 40 mg/mL injection INJECT 40 MG UNDER THE SKIN THREE TIMES A WEEK    hydroCHLOROthiazide (MICROZIDE) 12.5 mg capsule Take 12.5 mg by mouth once daily.    losartan (COZAAR) 100 MG tablet     modafiniL (PROVIGIL) 100 MG Tab Take 1 tablet (100 mg total) by mouth once daily.    oxybutynin (DITROPAN) 5 MG Tab TAKE 1 TABLET DAILY IN THE EVENING    sertraline (ZOLOFT) 50 MG tablet Take 1 tablet (50 mg total) by mouth once daily.     simvastatin (ZOCOR) 40 MG tablet Take 40 mg by mouth every evening.       SOCIAL HISTORY  Social History     Tobacco Use    Smoking status: Never    Smokeless tobacco: Never       Living arrangements - the patient lives with her spouse     ROS:      10/19/23    REVIEW OF SYMPTOMS   Do you feel abnormally tired on most days? No--does not nap; takes modafinil    Do you feel you generally sleep well? Yes   Do you have difficulty controlling your bladder?  No--gets up a few times at night to urinate; she gets right back to sleep. She denies UTIs.    Do you have difficulty controlling your bowels?  No--regular    Do you have frequent muscle cramps, tightness or spasms in your limbs?  No   Do you have new visual symptoms?  No--wears glasses; sees her eye doctor annually    Do you have worsening difficulty with your memory or thinking? No--her  helps her to manage medications   Do you have worsening symptoms of anxiety or depression?  No--a little anxiety sometimes   For patients who walk, Do you have more difficulty walking?  Yes   Have you fallen since your last visit?  No   For patients who use wheelchairs: Do you have any skin wounds or breakdown? Not Applicable   Do you have difficulty using your hands?  No   Do you have shooting or burning pain? No--feels tight after sitting for long periods of time    Do you have difficulty with sexual function?  Not assessed    If you are sexually active, are you using birth control? Y/N  N/A Not Applicable   Do you often choke when swallowing liquids or solid food?  No   Do you experience worsening symptoms when overheated? No--feels tired when she is too hot; also sensitive to cold    Do you need any new equipment such as a wheelchair, walker or shower chair? Yes--has shower bars and a built in bench   Do you receive co-pay financial assistance for your principal MS medicine? No--very minimal copay every 3 months    Would you be interested in participating in an MS  research trial in the future? No   For patients on Gilenya, Tecfidera, Aubagio, Rituxan, Ocrevus, Tysabri, Lemtrada or Methotrexate, are you aware that you should NOT receive live virus vaccines?  Not Applicable   Do you feel you have adequate family/friend support?  Yes   Do you have health insurance?   Yes   Are you currently employed? No   Do you receive SSDI/SSI?  No   Do you use marijuana or cannabis products? No   Have you been diagnosed with a urinary tract infection since your last visit here? No   Have you been diagnosed with a respiratory tract infection since your last visit here? No   Have you been to the emergency room since your last visit here? No   Have you been hospitalized since your last visit here?  No              Objective:        1. 25 foot timed walk:      4/19/2023    12:01 AM 10/19/2023    12:01 AM   Timed 25 Foot Walk:   Did patient wear an AFO? No No   Was assistive device used? Yes Yes   Assistive device used (cira one): Bilateral Assistance Bilateral Assistance   Bilateral device used Walker/Rollator Walker/Rollator   Time for 25 Foot Walk (seconds) 20.9 19.8   Time for 25 Foot Walk (seconds) 21 19.6       Neurologic Exam    In general, the patient is well nourished.     MENTAL STATUS: language is fluent, normal verbal comprehension, short-term and remote memory is intact, attention is normal, patient is alert and oriented x 3, fund of knowlege is appropriate by vocabulary.      CRANIAL NERVE EXAM:  20/100 OD and 20/70 OS; There is no intranuclear ophthalmoplegia.  Extraocular muscles are intact. No facial asymmetry. Facial sensation is intact bilaterally. There is no dysarthria. Uvula is midline, and palate moves symmetrically. Shoulder shrug intact bilaterally. Tongue protrusion is midline. Hearing is grossly intact. Neck is supple.     MOTOR EXAM: Normal bulk and tone throughout UE and LE bilaterally.  Rapid sequential movements are normal in UEs Strength is 5/5 in all groups in the  upper extremities; bilateral HF 5-/5, DF bilaterally 5-/5     REFLEXES:  2+ patellar R, 2+ patellar L; achilles 2+ bilaterally     SENSORY EXAM: Vibration normal at ankles and hands      COORDINATION: Normal finger-to-nose exam      GAIT: wide based, slight circumduction L LE, weak hip flexors.      Imaging:       Results for orders placed during the hospital encounter of 09/01/22    MRI Brain Demyelinating W W/O Contrast    Impression  White matter lesions consistent with demyelination/multiple sclerosis unchanged from the prior study.  There is a mild-moderate burden of white matter lesions.  These are all diffusion negative nonenhancing and no new lesions or diffusion positive lesions are seen.    Involutional change probably age appropriate.      Electronically signed by: Baldev Huynh MD  Date:    09/02/2022  Time:    09:30    Labs:     Lab Results   Component Value Date    XHDOKWLM92ER 90 08/01/2022    [id number] 68 06/09/2021    [id number] 98 (H) 01/22/2020       Lab Results   Component Value Date    WBC 7.38 01/24/2022    RBC 4.24 01/24/2022    HGB 12.9 01/24/2022    HCT 39.4 01/24/2022    MCV 93 01/24/2022    MCH 30.4 01/24/2022    MCHC 32.7 01/24/2022    RDW 13.4 01/24/2022     01/24/2022    MPV 10.9 01/24/2022    GRAN 5.1 01/24/2022    GRAN 68.5 01/24/2022    LYMPH 1.3 01/24/2022    LYMPH 17.6 (L) 01/24/2022    MONO 0.7 01/24/2022    MONO 9.2 01/24/2022    EOS 0.3 01/24/2022    BASO 0.05 01/24/2022    EOSINOPHIL 3.7 01/24/2022    BASOPHIL 0.7 01/24/2022     Sodium   Date Value Ref Range Status   11/30/2022 138 136 - 145 mmol/L Final     Potassium   Date Value Ref Range Status   11/30/2022 4.0 3.5 - 5.1 mmol/L Final     Chloride   Date Value Ref Range Status   11/30/2022 106 95 - 110 mmol/L Final     CO2   Date Value Ref Range Status   11/30/2022 27 23 - 29 mmol/L Final     Glucose   Date Value Ref Range Status   11/30/2022 96 70 - 110 mg/dL Final     BUN   Date Value Ref Range Status    11/30/2022 30 (H) 8 - 23 mg/dL Final     Creatinine   Date Value Ref Range Status   11/30/2022 0.9 0.5 - 1.4 mg/dL Final     Calcium   Date Value Ref Range Status   11/30/2022 10.1 8.7 - 10.5 mg/dL Final     Total Protein   Date Value Ref Range Status   01/24/2022 8.1 6.0 - 8.4 g/dL Final     Albumin   Date Value Ref Range Status   01/24/2022 3.8 3.5 - 5.2 g/dL Final     Total Bilirubin   Date Value Ref Range Status   01/24/2022 0.5 0.1 - 1.0 mg/dL Final     Comment:     For infants and newborns, interpretation of results should be based  on gestational age, weight and in agreement with clinical  observations.    Premature Infant recommended reference ranges:  Up to 24 hours.............<8.0 mg/dL  Up to 48 hours............<12.0 mg/dL  3-5 days..................<15.0 mg/dL  6-29 days.................<15.0 mg/dL       Alkaline Phosphatase   Date Value Ref Range Status   01/24/2022 71 55 - 135 U/L Final     AST   Date Value Ref Range Status   01/24/2022 20 10 - 40 U/L Final     ALT   Date Value Ref Range Status   01/24/2022 17 10 - 44 U/L Final     Anion Gap   Date Value Ref Range Status   11/30/2022 5 (L) 8 - 16 mmol/L Final     eGFR if    Date Value Ref Range Status   01/24/2022 >60.0 >60 mL/min/1.73 m^2 Final     eGFR if non    Date Value Ref Range Status   01/24/2022 >60.0 >60 mL/min/1.73 m^2 Final     Comment:     Calculation used to obtain the estimated glomerular filtration  rate (eGFR) is the CKD-EPI equation.          MS Impression and Plan:     NEURO MULTIPLE SCLEROSIS IMPRESSION:   MS Status:     Number of relapses in the past year?:  0    Clinical Progression:  Clinically Stable  Plan:     DMT:  No change in management    DMT comment:  Continue glatiramer and Vitamin D. Vit D lab planned for today.     Symptom Management:  Implement change in symptom management    Implement Change in Symptom Management:  Fatigue (Modafinil refill sent to pharmacy. Order for PT submitted  for [place] location.)       She will follow up with Dr. Joshua in 6 months.   MRIs every 2-3 years given clinical stability; will reassess in September 2024       Total time spent with patient: 26 minutes   Total time spent on encounter: 38 minutes         LATISHA Coles, CNS

## 2023-10-19 ENCOUNTER — LAB VISIT (OUTPATIENT)
Dept: LAB | Facility: HOSPITAL | Age: 73
End: 2023-10-19
Payer: MEDICARE

## 2023-10-19 ENCOUNTER — OFFICE VISIT (OUTPATIENT)
Dept: NEUROLOGY | Facility: CLINIC | Age: 73
End: 2023-10-19
Payer: MEDICARE

## 2023-10-19 VITALS
SYSTOLIC BLOOD PRESSURE: 118 MMHG | DIASTOLIC BLOOD PRESSURE: 78 MMHG | HEIGHT: 60 IN | HEART RATE: 69 BPM | BODY MASS INDEX: 35.94 KG/M2 | WEIGHT: 183.06 LBS

## 2023-10-19 DIAGNOSIS — G35 MULTIPLE SCLEROSIS: Primary | ICD-10-CM

## 2023-10-19 DIAGNOSIS — G35 MULTIPLE SCLEROSIS: ICD-10-CM

## 2023-10-19 DIAGNOSIS — R53.82 CHRONIC FATIGUE: ICD-10-CM

## 2023-10-19 DIAGNOSIS — Z74.09 IMPAIRED MOBILITY: ICD-10-CM

## 2023-10-19 DIAGNOSIS — Z79.899 OTHER LONG TERM (CURRENT) DRUG THERAPY: ICD-10-CM

## 2023-10-19 DIAGNOSIS — Z71.89 COUNSELING REGARDING GOALS OF CARE: ICD-10-CM

## 2023-10-19 DIAGNOSIS — Z29.89 PROPHYLACTIC IMMUNOTHERAPY: ICD-10-CM

## 2023-10-19 LAB — 25(OH)D3+25(OH)D2 SERPL-MCNC: 86 NG/ML (ref 30–96)

## 2023-10-19 PROCEDURE — 82306 VITAMIN D 25 HYDROXY: CPT | Performed by: CLINICAL NURSE SPECIALIST

## 2023-10-19 PROCEDURE — 99999 PR PBB SHADOW E&M-EST. PATIENT-LVL IV: ICD-10-PCS | Mod: PBBFAC,,, | Performed by: CLINICAL NURSE SPECIALIST

## 2023-10-19 PROCEDURE — 99999 PR PBB SHADOW E&M-EST. PATIENT-LVL IV: CPT | Mod: PBBFAC,,, | Performed by: CLINICAL NURSE SPECIALIST

## 2023-10-19 PROCEDURE — 99214 PR OFFICE/OUTPT VISIT, EST, LEVL IV, 30-39 MIN: ICD-10-PCS | Mod: S$PBB,,, | Performed by: CLINICAL NURSE SPECIALIST

## 2023-10-19 PROCEDURE — 99214 OFFICE O/P EST MOD 30 MIN: CPT | Mod: S$PBB,,, | Performed by: CLINICAL NURSE SPECIALIST

## 2023-10-19 PROCEDURE — 36415 COLL VENOUS BLD VENIPUNCTURE: CPT | Performed by: CLINICAL NURSE SPECIALIST

## 2023-10-19 PROCEDURE — 99214 OFFICE O/P EST MOD 30 MIN: CPT | Mod: PBBFAC | Performed by: CLINICAL NURSE SPECIALIST

## 2023-10-19 RX ORDER — ALPRAZOLAM 0.25 MG/1
TABLET ORAL
COMMUNITY

## 2023-10-19 RX ORDER — MODAFINIL 100 MG/1
100 TABLET ORAL DAILY
Qty: 90 TABLET | Refills: 1 | Status: SHIPPED | OUTPATIENT
Start: 2023-10-19

## 2023-12-11 ENCOUNTER — CLINICAL SUPPORT (OUTPATIENT)
Dept: REHABILITATION | Facility: HOSPITAL | Age: 73
End: 2023-12-11
Payer: MEDICARE

## 2023-12-11 DIAGNOSIS — R26.9 IMPAIRED GAIT: ICD-10-CM

## 2023-12-11 DIAGNOSIS — Z74.09 IMPAIRED MOBILITY AND ENDURANCE: Primary | ICD-10-CM

## 2023-12-11 DIAGNOSIS — G35 MULTIPLE SCLEROSIS: ICD-10-CM

## 2023-12-11 DIAGNOSIS — Z74.09 IMPAIRED MOBILITY: ICD-10-CM

## 2023-12-11 PROCEDURE — 97162 PT EVAL MOD COMPLEX 30 MIN: CPT | Mod: KX,PN

## 2023-12-19 PROBLEM — R26.9 IMPAIRED GAIT: Status: ACTIVE | Noted: 2023-12-19

## 2023-12-19 PROBLEM — Z74.09 IMPAIRED MOBILITY AND ENDURANCE: Status: ACTIVE | Noted: 2023-12-19

## 2023-12-19 NOTE — PLAN OF CARE
"[facility]  Physical Therapy Neurological Rehabilitation Initial Evaluation     Name: Marie Lejeune  Clinic Number: [id number]    Therapy Diagnosis:   Encounter Diagnoses   Name Primary?    Multiple sclerosis     Impaired mobility     Impaired mobility and endurance Yes    Impaired gait      Physician: Dejah Cordero APRN,*    Physician Orders: PT Eval and Treat   Medical Diagnosis from Referral: G35 (ICD-10-CM) - Multiple sclerosis; Z74.09 (ICD-10-CM) - Impaired mobility  Evaluation Date: 12/11/2023  Authorization Period Expiration: 10/18/2024  Plan of Care Expiration: 2/9/2024  Progress Note Due: 1/29/2024  Visit # / Visits authorized: 1/ 1 KX Modifier     Precautions: Fall; Multiple Sclerosis- no heat; do not fatigue    Time In: 8:06  Time Out: 8:52  Total Billable Time: 46 minutes    Subjective      Date of onset: Multiple Sclerosis onset and diagnosis ~20 years ago    History of current condition - Maryann reports still doing well since discharging from last round of therapy ~9 weeks ago. She reports keeping up with home exercises and activities, and not noticing any regression. Upon inquiry about reason for returning to therapy, she reports, "I just want to maintain everything. I have MS; so this is ongoing." She reports only notable current difficulty is stamina, especially for community activities.       Prior Therapy: Partially described above; Chart review shows ~13 months of treatment with last round, improvement with therapy, and further improvement since discharge  Social History: lives with her    Falls: none reported   DME: Straight cane and Rollator    Home Environment: 1 step-to-enter with single rail (Patient reports managing with rail, and with  taking and returning rollator)  Prior Level of Function: Mod I to SBA  Current Level of Function:Mod I to SBA     Pain:  Current 0/10    Patient's goals: "just to maintain strength"    Medical History:   Past " Medical History:   Diagnosis Date    Hypertension     MS (multiple sclerosis)     Multiple sclerosis        Surgical History:   Marie Lejeune  has a past surgical history that includes  section (1978); parcial hysterectomy; and Cholecystectomy ().    Medications:   Maryann has a current medication list which includes the following prescription(s): alprazolam, amlodipine, ascorbic acid (vitamin c), atenolol, biotin, calcium carbonate, co-enzyme q-10, cyanocobalamin, vitamin d2, fish oil-omega-3 fatty acids, copaxone, hydrochlorothiazide, losartan, modafinil, oxybutynin, sertraline, and simvastatin.    Allergies:   Review of patient's allergies indicates:  No Known Allergies     Objective      Mental status: AAO x 4    Posture Alignment : WNL    Observation: bilateral ankle swelling    Sensation: LE light touch in-tact and equal bilaterally     Tone: B knee-extensors: 1+ Slight increases in muscle tone, manifested by a catch, followed by minimal resistance throughout the remainder (less than half) of the ROM.    LE Coordination:     -Alternating toe-taps: WNL    -Heel-to-shin: L: impaired; R: WNL but limited by hip stiffness      RANGE OF MOTION--LOWER EXTREMITIES    (L) LE Hip: Decreased   Knee: WFL   Ankle: Decreased    (R) LE: Hip: Decreased   Knee: WFL   Ankle: Decreased    Strength: manual muscle test grades below         MMT     Left        Right   Hip flexion 3/5 4-/5   Knee extension  5/5 5/5   Knee flexion  2/5 4/5   Ankle dorsiflexion 4/5 4+/5   Ankle plantarflexion NT NT   Hip abduction  2/5 in GE 2/5 in GE        Evaluation   Timed Up and Go 29 sec with rollator  < 20 sec safe for independent transfers,     < 30 sec assist required for transfers   10-Meter Walk Test NT m/sec (SSWS)   6-Minute Walk Test 274 ft        Evaluation   Single Limb Stance L LE NT  (<10 sec = HIGH FALL RISK)   Single Limb Stance R LE NT  (<10 sec = HIGH FALL RISK)   5-Times Sit-to-Stand 21 seconds without UE use  >12  sec= fall risk for general elderly  >16 sec= fall risk for Parkinson's disease  >10 sec= balance/vestibular dysfunction (<61 y/o)  >14.2 sec= balance/vestibular dysfunction (>61 y/o)  >12 sec= fall risk for CVA   Hopper/ FGA/ Tinetti NT       Standing balance: wide ILIA, no loss of stability      Gait Assessment:   - AD used: tripod rollator  - Assistance: Mod I  - Curb:  assists with AD management; patient manages with 1 rail      Functional Mobility (Bed mobility, transfers)  Sit to stand: Mod I    Stand/ squat pivot: Spv      Treatment     Evaluation today    Patient Education and Home Exercises     Education provided:   -discussion of physical therapy plan of care    Written Home Exercises Provided: Patient instructed to cont prior HEP.  Exercises were reviewed and Maryann was able to demonstrate them prior to the end of the session.  Maryann demonstrated good  understanding of the education provided.     Assessment     Mrs. Lejeune is a 73 y.o. female referred to outpatient Physical Therapy with a medical diagnosis of G35 (ICD-10-CM) - Multiple sclerosis; Z74.09 (ICD-10-CM) - Impaired mobility. Patient presents only 9 weeks post-discharge of last round of OP physical therapy, with details of this further described above. She was informed of standards justifying appropriate skilled physical therapy, and that resuming therapy now would only be for brief period. This was confirmed with examination which showed little variation from previous discharge values. With examination today, patient demonstrates bilateral knee-extensor hypertonia, impaired left lower extremity coordination, bilateral ankle swelling, impaired hip and ankle mobility, left hip-flexion weakness, left knee-flexion weakness, bilateral hip abduction weakness, impaired gait mechanics and independence level, reduced functional capacity with 5-Times Sit-to-Stand, impaired gait stability with Timed Up and Go, and impaired endurance with 6-Minute Walk  Test. She will benefit from brief plan of care for skilled physical therapy to address the stated deficits and improve daily mobility.     Patient prognosis is Good.   Patient will benefit from skilled outpatient Physical Therapy to address the deficits stated above and in the chart below, provide patient /family education, and to maximize patient's level of independence.     Plan of care discussed with patient: Yes  Patient's spiritual, cultural and educational needs considered and patient is agreeable to the plan of care and goals as stated below:     Anticipated Barriers for therapy: chronic nature of disease     Medical Necessity is demonstrated by the following  History  Co-morbidities and personal factors that may impact the plan of care [] LOW: no personal factors / co-morbidities  [x] MODERATE: 1-2 personal factors / co-morbidities  [] HIGH: 3+ personal factors / co-morbidities    Moderate / High Support Documentation:   Co-morbidities affecting plan of care: MS co-morbidities     Personal Factors:   age  coping style     Examination  Body Structures and Functions, activity limitations and participation restrictions that may impact the plan of care [] LOW: addressing 1-2 elements  [x] MODERATE: 3+ elements  [] HIGH: 4+ elements (please support below)    Moderate / High Support Documentation: Exam revealed patient to be outside of normal limits in elements of: left hip flexion strength, left knee flexion strength, bilateral hip abduction strength, bilateral ankle mobility, left LE coordination, bilateral knee-extensor tone, gait stability, functional capacity, curb management, and gait endurance     Clinical Presentation [] LOW: stable  [x] MODERATE: Evolving  [] HIGH: Unstable     Decision Making/ Complexity Score: moderate       Goals:  Short Term Goals: 3 weeks   -Patient will increase left ankle dorsiflexion strength to 4+/5 for improved gait mechanics.  -Patient will increase bilateral hip abduction  strength to 2+/5 for improved lateral stability.  -Patient will be independent with home exercise program including walking routine for increasing and maintaining endurance.    Long Term Goals: 5 weeks   -Patient will achieve 6-Minute Walk Test score of 400 feet (121 meters) to demonstrate increased gait endurance for community activities.   -Patient will complete 5-Times Sit-to- 16 seconds to demonstrate increased functional capacity and stamina for daily activities.  -Patient will achieve Timed Up and Go score of </= 25 seconds with rollator to demonstrate increased gait stability.    Plan     Plan of care Certification: 12/11/2023 to 2/9/2024   Treatment beginning 1/2/2024    Outpatient Physical Therapy 1 times weekly for 5 weeks to include the following interventions: Gait Training, Manual Therapy, Neuromuscular Re-ed, Self Care, Therapeutic Activities, and Therapeutic Exercise.     Mary Woo, PT, DPT        Physician's Signature: _________________________________________ Date: ________________

## 2024-01-02 ENCOUNTER — CLINICAL SUPPORT (OUTPATIENT)
Dept: REHABILITATION | Facility: HOSPITAL | Age: 74
End: 2024-01-02
Payer: MEDICARE

## 2024-01-02 DIAGNOSIS — R26.9 IMPAIRED GAIT: ICD-10-CM

## 2024-01-02 DIAGNOSIS — Z74.09 IMPAIRED MOBILITY AND ENDURANCE: Primary | ICD-10-CM

## 2024-01-02 PROCEDURE — 97112 NEUROMUSCULAR REEDUCATION: CPT | Mod: KX,PN

## 2024-01-02 PROCEDURE — 97110 THERAPEUTIC EXERCISES: CPT | Mod: KX,PN

## 2024-01-02 NOTE — PROGRESS NOTES
[facility]   Physical Therapy Treatment Note      Name: Marie Lejeune  Clinic Number: [id number]    Therapy Diagnosis:   Encounter Diagnoses   Name Primary?    Impaired mobility and endurance Yes    Impaired gait      Physician: Dejah Cordero APRN,*    Visit Date: 1/2/2024    Physician Orders: PT Eval and Treat   Medical Diagnosis from Referral: G35 (ICD-10-CM) - Multiple sclerosis; Z74.09 (ICD-10-CM) - Impaired mobility  Evaluation Date: 12/11/2023  Authorization Period Expiration: 12/20/2024  Plan of Care Expiration: 2/9/2024  Progress Note Due: 1/29/2024  Visit # / Visits authorized: 1/80 KX Modifier      Precautions: Fall; Multiple Sclerosis- no heat; do not fatigue     Time In: 5:04  Time Out: 5:44  Total Billable Time: 40 minutes      Subjective     Maryann reports trying to increase walks gradually at home to work on stamina as planned with POC, and as discussed last visit.    She was compliant with home exercise program.    Pain: 0/10    Objective      Objective Measures updated at progress report unless specified.     Treatment     Maryann received the treatments listed below:      therapeutic exercises to develop strength, endurance, and ROM for 23 minutes including:    -Nustep with LEs, Level 2.0- x 8 minutes    -isolated seated hip flexion:    -teaching form without compensations   - x 15 each    -isolated hip adduction:   -teaching form without compensations    - x 15 each       neuromuscular re-education activities to improve: Balance, Coordination, and Kinesthetic for 17 minutes. The following activities were included:    -gait with 1UE with neutral LEs- 9 ft x 10 rounds    -side-stepping with cues for LE positioning- 9 ft x 2 rounds      Patient Education and Home Exercises       Education provided:   -discussion of physical therapy plan of care     Written Home Exercises Provided: Patient instructed to cont prior HEP.  Exercises were reviewed and Maryann was able to  demonstrate them prior to the end of the session.  Maryann demonstrated good  understanding of the education provided.     Assessment     Mrs. Lejeune tolerated first session well. She was challenged by initiation of lower extremity isolated motion, for both hip-flexion and hip adduction with compensatory strategies removed; but she did complete this well for first attempts. She also responded well to gait training with decreased assistance, using only single upper extremity; as well as with therapist-guided change in gait mechanics. Stability was notably challenged by this change; but patient still completed well. Side-stepping with lower extremities closer to neutral was more difficult for her. Patient remains appropriate for skilled physical therapy.     Maryann Is progressing well towards her goals.   Patient prognosis is Good.     Patient will continue to benefit from skilled outpatient physical therapy to address the deficits listed in the problem list box on initial evaluation, provide pt/family education and to maximize pt's level of independence in the home and community environment.     Patient's spiritual, cultural and educational needs considered and pt agreeable to plan of care and goals.     Anticipated barriers to physical therapy: chronic nature of disease     Goals:   Short Term Goals: 3 weeks   -Patient will increase left ankle dorsiflexion strength to 4+/5 for improved gait mechanics.  -Patient will increase bilateral hip abduction strength to 2+/5 for improved lateral stability.  -Patient will be independent with home exercise program including walking routine for increasing and maintaining endurance.     Long Term Goals: 5 weeks   -Patient will achieve 6-Minute Walk Test score of 400 feet (121 meters) to demonstrate increased gait endurance for community activities.   -Patient will complete 5-Times Sit-to- 16 seconds to demonstrate increased functional capacity and stamina for daily  activities.  -Patient will achieve Timed Up and Go score of </= 25 seconds with rollator to demonstrate increased gait stability.    Plan     Plan of care Certification: 12/11/2023 to 2/9/2024              Treatment beginning 1/2/2024     Outpatient Physical Therapy 1 times weekly for 5 weeks to include the following interventions: Gait Training, Manual Therapy, Neuromuscular Re-ed, Self Care, Therapeutic Activities, and Therapeutic Exercise.     Mary Woo, PT, DPT     01/02/2024

## 2024-01-08 ENCOUNTER — CLINICAL SUPPORT (OUTPATIENT)
Dept: REHABILITATION | Facility: HOSPITAL | Age: 74
End: 2024-01-08
Payer: MEDICARE

## 2024-01-08 DIAGNOSIS — Z74.09 IMPAIRED MOBILITY AND ENDURANCE: Primary | ICD-10-CM

## 2024-01-08 DIAGNOSIS — R26.9 IMPAIRED GAIT: ICD-10-CM

## 2024-01-08 PROCEDURE — 97112 NEUROMUSCULAR REEDUCATION: CPT | Mod: PN

## 2024-01-08 PROCEDURE — 97110 THERAPEUTIC EXERCISES: CPT | Mod: PN

## 2024-01-08 NOTE — PROGRESS NOTES
[facility]   Physical Therapy Treatment Note      Name: Marie Lejeune  Clinic Number: [id number]    Therapy Diagnosis:   Encounter Diagnoses   Name Primary?    Impaired mobility and endurance Yes    Impaired gait      Physician: Dejah Cordero APRN,*    Visit Date: 1/8/2024    Physician Orders: PT Eval and Treat   Medical Diagnosis from Referral: G35 (ICD-10-CM) - Multiple sclerosis; Z74.09 (ICD-10-CM) - Impaired mobility  Evaluation Date: 12/11/2023  Authorization Period Expiration: 12/20/2024  Plan of Care Expiration: 2/9/2024  Progress Note Due: 1/29/2024  Visit # / Visits authorized: 2/80 KX Modifier      Precautions: Fall; Multiple Sclerosis- no heat; do not fatigue     Time In: 8:47  Time Out: 9:30  Total Billable Time: 43 minutes      Subjective     Maryann reports legs felt fine, and no notable fatigue following last session.    She was compliant with home exercise program.    Pain: 0/10    Objective      Objective Measures updated at progress report unless specified.     Treatment     Maryann received the treatments listed below:      therapeutic exercises to develop strength, endurance, and ROM for 28 minutes including:    -Nustep with LEs, Level 2.0- x 10 minutes    -isolated seated hip flexion, with mirror   -re-teaching form without compensations   - x 20 each    -isolated hip adduction, with small football squeeze   -teaching form without compensations    - 3-second holds x 20 each     *rest breaks provided throughout as needed    neuromuscular re-education activities to improve: Balance, Coordination, and Kinesthetic for 15 minutes. The following activities were included:    -gait with 1UE with neutral LEs- 9 ft x 2 rounds    -side-stepping with cues for LE positioning- 9 ft x 4 rounds    *rest breaks provided throughout as needed    Patient Education and Home Exercises       Education provided:   -discussion of physical therapy plan of care     Written Home Exercises  Provided: Patient instructed to cont prior HEP.  Exercises were reviewed and Maryann was able to demonstrate them prior to the end of the session.  Maryann demonstrated good  understanding of the education provided.     Assessment     Mrs. Lejeune tolerated session well. She was challenged by upgrades of hip-flexion isolation, but did respond well once acclimated. Cues needed occasionally for staying closer to rollator, increasing upright posture, and decreasing upper extremity weight-bearing to device. Patient did make these corrections. Improved lower extremity positioning demonstrated with side-stepping and gait training with single upper extremity support today as compared to first trial last session. Patient remains appropriate for skilled physical therapy.       Maryann Is progressing well towards her goals.   Patient prognosis is Good.     Patient will continue to benefit from skilled outpatient physical therapy to address the deficits listed in the problem list box on initial evaluation, provide pt/family education and to maximize pt's level of independence in the home and community environment.     Patient's spiritual, cultural and educational needs considered and pt agreeable to plan of care and goals.     Anticipated barriers to physical therapy: chronic nature of disease     Goals:   Short Term Goals: 3 weeks   -Patient will increase left ankle dorsiflexion strength to 4+/5 for improved gait mechanics.  -Patient will increase bilateral hip abduction strength to 2+/5 for improved lateral stability.  -Patient will be independent with home exercise program including walking routine for increasing and maintaining endurance.     Long Term Goals: 5 weeks   -Patient will achieve 6-Minute Walk Test score of 400 feet (121 meters) to demonstrate increased gait endurance for community activities.   -Patient will complete 5-Times Sit-to- 16 seconds to demonstrate increased functional capacity and stamina for daily  activities.  -Patient will achieve Timed Up and Go score of </= 25 seconds with rollator to demonstrate increased gait stability.    Plan     Plan of care Certification: 12/11/2023 to 2/9/2024              Treatment beginning 1/2/2024     Outpatient Physical Therapy 1 times weekly for 5 weeks to include the following interventions: Gait Training, Manual Therapy, Neuromuscular Re-ed, Self Care, Therapeutic Activities, and Therapeutic Exercise.     Mary Woo, PT, DPT     01/08/2024

## 2024-01-16 ENCOUNTER — TELEPHONE (OUTPATIENT)
Dept: NEUROLOGY | Facility: CLINIC | Age: 74
End: 2024-01-16
Payer: MEDICARE

## 2024-01-16 DIAGNOSIS — G35 MULTIPLE SCLEROSIS: ICD-10-CM

## 2024-01-16 NOTE — TELEPHONE ENCOUNTER
6 months of glatiramer was sent to [facility] on 9/25. Refills should be on file. Tried to call patient to inform. No answer and no voicemail available     Sent [facility]

## 2024-01-16 NOTE — TELEPHONE ENCOUNTER
----- Message from Debbie Gamboa RN sent at 1/12/2024 10:31 AM CST -----  Regarding: FW: Refill  Contact: pt.  [phone]    ----- Message -----  From: Ganesh Lopez  Sent: 1/12/2024  10:10 AM CST  To: [facility] Staff  Subject: Refill                                               Rx Refill/Request Is this a Refill or New Rx:  Refill    Rx Name and Strength:  glatiramer (COPAXONE) 40 mg/mL injection    Preferred Pharmacy with phone number:    [facility] [address] [address]  Phone: [phone] Fax: [phone]        Communication Preference: [phone]    Additional Information: Last refill never came pt is almost out of medication

## 2024-01-18 ENCOUNTER — TELEPHONE (OUTPATIENT)
Dept: NEUROLOGY | Facility: CLINIC | Age: 74
End: 2024-01-18

## 2024-01-18 NOTE — TELEPHONE ENCOUNTER
----- Message from Debbie Gamboa RN sent at 1/17/2024  3:08 PM CST -----  Regarding: FW: refill  Contact: [phone]    ----- Message -----  From: Debi Velazquez  Sent: 1/17/2024   1:51 PM CST  To: [facility] Staff  Subject: refill                                           Pt stated she has been out of the medication for a week now. Pt stated she was told there was supply chain problems. Pt still has not received the medication. Pls call to discuss.   glatiramer (COPAXONE) 40 mg/mL injection    ..  [facility]  for [address] [address]  Phone: [phone] Fax: [phone]

## 2024-01-18 NOTE — TELEPHONE ENCOUNTER
Spoke to patient and she states she will about two weeks of medication due to supply chain issues / weather. Patient has  and locked into filling with [facility]. She will receive med on 1/23. Advised to resume on 1/23 with a T, Thursday, and Saturday schedule and can resume a MWF schedule the next week. No other questions or concerns

## 2024-01-22 ENCOUNTER — PATIENT MESSAGE (OUTPATIENT)
Dept: PSYCHIATRY | Facility: CLINIC | Age: 74
End: 2024-01-22
Payer: MEDICARE

## 2024-01-22 ENCOUNTER — CLINICAL SUPPORT (OUTPATIENT)
Dept: REHABILITATION | Facility: HOSPITAL | Age: 74
End: 2024-01-22
Payer: MEDICARE

## 2024-01-22 DIAGNOSIS — Z74.09 IMPAIRED MOBILITY AND ENDURANCE: Primary | ICD-10-CM

## 2024-01-22 DIAGNOSIS — R26.9 IMPAIRED GAIT: ICD-10-CM

## 2024-01-22 PROCEDURE — 97112 NEUROMUSCULAR REEDUCATION: CPT | Mod: PN

## 2024-01-22 PROCEDURE — 97110 THERAPEUTIC EXERCISES: CPT | Mod: PN

## 2024-01-22 NOTE — PROGRESS NOTES
[facility]   Physical Therapy Treatment Note      Name: Marie Lejeune  Clinic Number: [id number]    Therapy Diagnosis:   Encounter Diagnoses   Name Primary?    Impaired mobility and endurance Yes    Impaired gait      Physician: Dejah Cordero APRN,*    Visit Date: 1/22/2024    Physician Orders: PT Eval and Treat   Medical Diagnosis from Referral: G35 (ICD-10-CM) - Multiple sclerosis; Z74.09 (ICD-10-CM) - Impaired mobility  Evaluation Date: 12/11/2023  Authorization Period Expiration: 12/20/2024  Plan of Care Expiration: 2/9/2024  Progress Note Due: 1/29/2024  Visit # / Visits authorized: 3/80 KX Modifier      Precautions: Fall; Multiple Sclerosis- no heat; do not fatigue     Time In: 8:50  Time Out: 9:35  Total Billable Time: 45 minutes      Subjective     Maryann reports practicing new techniques with walking, and that it's becoming more natural.    She was compliant with home exercise program.    Pain: 0/10    Objective      Objective Measures updated at progress report unless specified.     Treatment     Maryann received the treatments listed below:      therapeutic exercises to develop strength, endurance, and ROM for 16 minutes including:    -Nustep with LEs, Level 2.0- x 10 minutes    -isolated ankle dorsiflexion with mirror:   -teaching technique, x 5   -x 20    *rest breaks provided throughout as needed    neuromuscular re-education activities to improve: Balance, Coordination, and Kinesthetic for 29 minutes. The following activities were included:    -side-stepping with cues for LE positioning:   -with BUE support- 9 ft x 2 rounds   -with single leading UE- 9 ft x 2 rounds     -sit-to-stands without UE support from medium-height block- 2 x 5    -gait with 1UE with neutral LEs- 9 ft x 2 rounds    *rest breaks provided throughout as needed    Patient Education and Home Exercises       Education provided:   -discussion of physical therapy plan of care     Written Home Exercises  Provided: Patient instructed to cont prior HEP.  Exercises were reviewed and Maryann was able to demonstrate them prior to the end of the session.  Maryann demonstrated good  understanding of the education provided.     Assessment     Mrs. Lejeune tolerated session well. Gait mechanics demonstrated slight improvement upon entrance into clinic with rollator. Improvement further demonstrated with gait training using only 1 upper extremity  support in bars. Right external rotation still present, but decreased with this method today. Increased gait pace notable with second round of this today as well. Patient also performed upgrade of side-stepping with only single upper extremity well. Right dorsiflexion was challenging. Patient remains appropriate for skilled physical therapy.       Maryann Is progressing well towards her goals.   Patient prognosis is Good.     Patient will continue to benefit from skilled outpatient physical therapy to address the deficits listed in the problem list box on initial evaluation, provide pt/family education and to maximize pt's level of independence in the home and community environment.     Patient's spiritual, cultural and educational needs considered and pt agreeable to plan of care and goals.     Anticipated barriers to physical therapy: chronic nature of disease     Goals:   Short Term Goals: 3 weeks   -Patient will increase left ankle dorsiflexion strength to 4+/5 for improved gait mechanics.  -Patient will increase bilateral hip abduction strength to 2+/5 for improved lateral stability.  -Patient will be independent with home exercise program including walking routine for increasing and maintaining endurance.     Long Term Goals: 5 weeks   -Patient will achieve 6-Minute Walk Test score of 400 feet (121 meters) to demonstrate increased gait endurance for community activities.   -Patient will complete 5-Times Sit-to- 16 seconds to demonstrate increased functional capacity and  stamina for daily activities.  -Patient will achieve Timed Up and Go score of </= 25 seconds with rollator to demonstrate increased gait stability.    Plan     Plan of care Certification: 12/11/2023 to 2/9/2024              Treatment beginning 1/2/2024     Outpatient Physical Therapy 1 times weekly for 5 weeks to include the following interventions: Gait Training, Manual Therapy, Neuromuscular Re-ed, Self Care, Therapeutic Activities, and Therapeutic Exercise.     Mary Woo, PT, DPT     01/22/2024

## 2024-01-29 ENCOUNTER — CLINICAL SUPPORT (OUTPATIENT)
Dept: REHABILITATION | Facility: HOSPITAL | Age: 74
End: 2024-01-29
Payer: MEDICARE

## 2024-01-29 DIAGNOSIS — Z74.09 IMPAIRED MOBILITY AND ENDURANCE: Primary | ICD-10-CM

## 2024-01-29 DIAGNOSIS — R26.9 IMPAIRED GAIT: ICD-10-CM

## 2024-01-29 PROCEDURE — 97112 NEUROMUSCULAR REEDUCATION: CPT | Mod: PN

## 2024-01-29 PROCEDURE — 97110 THERAPEUTIC EXERCISES: CPT | Mod: PN

## 2024-01-29 NOTE — PROGRESS NOTES
[facility]   Physical Therapy Treatment Note / Discharge Summary     Name: Marie Lejeune  Clinic Number: [id number]    Therapy Diagnosis:   Encounter Diagnoses   Name Primary?    Impaired mobility and endurance Yes    Impaired gait      Physician: Dejah Cordero APRN,*    Visit Date: 1/29/2024    Physician Orders: PT Eval and Treat   Medical Diagnosis from Referral: G35 (ICD-10-CM) - Multiple sclerosis; Z74.09 (ICD-10-CM) - Impaired mobility  Evaluation Date: 12/11/2023  Authorization Period Expiration: 12/20/2024  Plan of Care Expiration: 2/9/2024 Discharge today   Progress Note Due: 1/29/2024  Visit # / Visits authorized: 4/80     Precautions: Fall; Multiple Sclerosis- no heat; do not fatigue     Time In: 8:46  Time Out: 9:33  Total Billable Time: 47 minutes      Subjective     Maryann reports everything going well at home. She reports practicing of recent techniques trained also continues to go well. Patient demonstrates understanding of discharge today after re-education.    She was compliant with home exercise program.    Pain: 0/10    Objective             MMT     Left        Right 1/29/24   Hip flexion 3/5 4-/5    Knee extension  5/5 5/5    Knee flexion  2/5 4/5    Ankle dorsiflexion 4/5 4+/5 Left 4/5   Ankle plantarflexion NT NT    Hip abduction  2/5 in GE 2/5 in GE 2+/5 in GE           Evaluation 1/29/24   Timed Up and Go 29 sec with rollator  < 20 sec safe for independent transfers,     < 30 sec assist required for transfers 24 seconds with rollator   10-Meter Walk Test NT m/sec (SSWS)    6-Minute Walk Test 274 ft 240 ft            Evaluation 1/29/24   Single Limb Stance L LE NT  (<10 sec = HIGH FALL RISK)    Single Limb Stance R LE NT  (<10 sec = HIGH FALL RISK)    5-Times Sit-to-Stand 21 seconds without UE use  >12 sec= fall risk for general elderly  >16 sec= fall risk for Parkinson's disease  >10 sec= balance/vestibular dysfunction (<59 y/o)  >14.2 sec= balance/vestibular  dysfunction (>61 y/o)  >12 sec= fall risk for CVA 17 seconds without UE use    Hopper/ FGA/ Tinetti NT          Standing balance: wide ILIA, no loss of stability        Gait Assessment:   - AD used: tripod rollator  - Assistance: Mod I  - Curb:  assists with AD management; patient manages with 1 rail        Functional Mobility (Bed mobility, transfers)  Sit to stand: Mod I    Stand/ squat pivot: Spv    Treatment     Maryann received the treatments listed below:      therapeutic exercises to develop strength, endurance, and ROM for 14 minutes including:    -Nustep with LEs, Level 2.0- x 8 minutes    -standing heel-raises- 5 x 5   -cues for lower extremity positioning and posture     *rest breaks provided throughout as needed    neuromuscular re-education activities to improve: Balance, Coordination, and Kinesthetic for 33 minutes. The following activities were included:    -re-exam elements- details described in Objective section above    -gait and discharge education and planning     *rest breaks provided throughout as needed    Patient Education and Home Exercises       Education provided:   -discussion of physical therapy plan of care     Written Home Exercises Provided: Patient instructed to cont prior HEP.  Exercises were reviewed and Maryann was able to demonstrate them prior to the end of the session.  Maryann demonstrated good  understanding of the education provided.     Assessment     Mrs. Lejeune had successful brief round of therapy. With re-examination today, she demonstrates improvements in gait stability with Timed Up and Go, functional strength and capacity with 5-Times Sit-to-Stand, gait mechanics and efficiency, hip abduction strength, and independence with upgraded home program. Patient is discharged from skilled physical therapy at this time with continued home exercises, home activities, and gait to further increase endurance.       Goals:   Status as of 1/29/24   Short Term Goals: 3 weeks    -Patient will increase left ankle dorsiflexion strength to 4+/5 for improved gait mechanics. Not met 1/29/24  -Patient will increase bilateral hip abduction strength to 2+/5 for improved lateral stability. Met 1/29/24  -Patient will be independent with home exercise program including walking routine for increasing and maintaining endurance. Met 1/29/24     Long Term Goals: 5 weeks   -Patient will achieve 6-Minute Walk Test score of 400 feet (121 meters) to demonstrate increased gait endurance for community activities. Not met 1/29/24  -Patient will complete 5-Times Sit-to- 16 seconds to demonstrate increased functional capacity and stamina for daily activities. Near met 1/29/24  -Patient will achieve Timed Up and Go score of </= 25 seconds with rollator to demonstrate increased gait stability. Met 1/29/24    Plan     Discharged today with continuation of home exercises, activities, and gait     Outpatient Physical Therapy 1 times weekly for 5 weeks to include the following interventions: Gait Training, Manual Therapy, Neuromuscular Re-ed, Self Care, Therapeutic Activities, and Therapeutic Exercise.     Mary Woo, PT, DPT     01/29/2024

## 2024-03-13 DIAGNOSIS — G35 MULTIPLE SCLEROSIS: ICD-10-CM

## 2024-03-13 RX ORDER — GLATIRAMER ACETATE 40 MG/ML
INJECTION, SOLUTION SUBCUTANEOUS
Qty: 36 ML | Refills: 1 | Status: SHIPPED | OUTPATIENT
Start: 2024-03-13

## 2024-03-31 DIAGNOSIS — G35 MULTIPLE SCLEROSIS: ICD-10-CM

## 2024-03-31 DIAGNOSIS — N31.9 NEUROGENIC BLADDER: ICD-10-CM

## 2024-04-01 RX ORDER — OXYBUTYNIN CHLORIDE 5 MG/1
TABLET ORAL
Qty: 90 TABLET | Refills: 3 | Status: SHIPPED | OUTPATIENT
Start: 2024-04-01

## 2024-04-15 ENCOUNTER — OFFICE VISIT (OUTPATIENT)
Dept: NEUROLOGY | Facility: CLINIC | Age: 74
End: 2024-04-15
Payer: MEDICARE

## 2024-04-15 VITALS
HEART RATE: 78 BPM | HEIGHT: 60 IN | BODY MASS INDEX: 37.41 KG/M2 | SYSTOLIC BLOOD PRESSURE: 123 MMHG | WEIGHT: 190.56 LBS | DIASTOLIC BLOOD PRESSURE: 73 MMHG

## 2024-04-15 DIAGNOSIS — Z71.89 COUNSELING REGARDING GOALS OF CARE: ICD-10-CM

## 2024-04-15 DIAGNOSIS — E66.01 SEVERE OBESITY (BMI 35.0-39.9) WITH COMORBIDITY: ICD-10-CM

## 2024-04-15 DIAGNOSIS — N31.9 NEUROGENIC BLADDER: ICD-10-CM

## 2024-04-15 DIAGNOSIS — R26.9 GAIT DISTURBANCE: ICD-10-CM

## 2024-04-15 DIAGNOSIS — Z29.89 PROPHYLACTIC IMMUNOTHERAPY: ICD-10-CM

## 2024-04-15 DIAGNOSIS — G35 MS (MULTIPLE SCLEROSIS): Primary | ICD-10-CM

## 2024-04-15 PROCEDURE — 99214 OFFICE O/P EST MOD 30 MIN: CPT | Mod: S$PBB,,, | Performed by: PSYCHIATRY & NEUROLOGY

## 2024-04-15 PROCEDURE — 99999 PR PBB SHADOW E&M-EST. PATIENT-LVL III: CPT | Mod: PBBFAC,,, | Performed by: PSYCHIATRY & NEUROLOGY

## 2024-04-15 PROCEDURE — 99213 OFFICE O/P EST LOW 20 MIN: CPT | Mod: PBBFAC | Performed by: PSYCHIATRY & NEUROLOGY

## 2024-04-15 RX ORDER — MIRABEGRON 25 MG/1
25 TABLET, FILM COATED, EXTENDED RELEASE ORAL NIGHTLY
Qty: 30 TABLET | Refills: 11 | Status: SHIPPED | OUTPATIENT
Start: 2024-04-15 | End: 2024-06-12 | Stop reason: SDUPTHER

## 2024-04-15 NOTE — PROGRESS NOTES
Subjective:          Patient ID: Marie Lejeune is a 73 y.o. female who presents today for a routine clinic visit for MS.  She comes in with her .     MS HPI:  DMT: glatiramer acetate  Side effects from DMT? No  Taking vitamin D3 as recommended? Yes - 5,000 IU four times/ week   Does walk around house with tripod rollator   One fall - no injury;   Right now, she feels she has static disability; denies a sense of decline at the moment;   Her  helps a lot, and occasionally they have hired help;   Exercising every day; -   Did course of PT - [facility] ; finds it helpful  Xanax prescribed by Dr. Rao  On modafinil 100mg for fatigue;   Has nocturia x 3 hs;     Medications:  Current Outpatient Medications   Medication Sig Dispense Refill    ALPRAZolam (XANAX) 0.25 MG tablet TAKE 1 TO 2 TABLETS BY MOUTH EVERY 8 HOURS AS NEEDED      amLODIPine (NORVASC) 10 MG tablet Take 10 mg by mouth once daily.      ascorbic acid, vitamin C, (VITAMIN C) 500 MG tablet Take 500 mg by mouth once daily.      atenoloL (TENORMIN) 25 MG tablet Take 25 mg by mouth 2 (two) times daily.      biotin 300 mcg Tab PATIENT NOT CERTAIN ON THE DOSAGE. TAKE ONE capsule by MOUTH daily  5    calcium carbonate (OS-ANISHA) 600 mg (1,500 mg) Tab Take 600 mg by mouth 2 (two) times daily with meals.      co-enzyme Q-10 30 mg capsule Take 400 mg by mouth once daily.      cyanocobalamin 500 MCG tablet Take 500 mcg by mouth once daily.      ergocalciferol (VITAMIN D2) 50,000 unit Cap Take 5,000 Units by mouth once daily.       glatiramer (COPAXONE) 40 mg/mL injection INJECT 40 MG UNDER THE SKIN THREE TIMES A WEEK 36 mL 1    hydroCHLOROthiazide (MICROZIDE) 12.5 mg capsule Take 12.5 mg by mouth once daily.      losartan (COZAAR) 100 MG tablet       modafiniL (PROVIGIL) 100 MG Tab Take 1 tablet (100 mg total) by mouth once daily. 90 tablet 1    oxybutynin (DITROPAN) 5 MG Tab TAKE 1 TABLET DAILY IN THE EVENING 90 tablet 3    sertraline (ZOLOFT) 50  MG tablet Take 1 tablet (50 mg total) by mouth once daily. 30 tablet 11    simvastatin (ZOCOR) 40 MG tablet Take 40 mg by mouth every evening.      mirabegron (MYRBETRIQ) 25 mg Tb24 ER tablet Take 1 tablet (25 mg total) by mouth every evening. 30 tablet 11     No current facility-administered medications for this visit.       SOCIAL HISTORY  Social History     Tobacco Use    Smoking status: Never    Smokeless tobacco: Never       Living arrangements - the patient lives with their spouse.          4/15/2024     8:20 AM   REVIEW OF SYMPTOMS   Do you feel abnormally tired on most days? Yes   Do you feel you generally sleep well? Yes   Do you have difficulty controlling your bladder?  Yes   Do you have difficulty controlling your bowels?  No   Do you have frequent muscle cramps, tightness or spasms in your limbs?  No   Do you have new visual symptoms?  No   Do you have worsening difficulty with your memory or thinking? No   Do you have worsening symptoms of anxiety or depression?  No   For patients who walk, Do you have more difficulty walking?  Yes   Have you fallen since your last visit?  Yes   For patients who use wheelchairs: Do you have any skin wounds or breakdown? Not Applicable   Do you have difficulty using your hands?  No   Do you have shooting or burning pain? No   Do you have difficulty with sexual function?  Yes   If you are sexually active, are you using birth control? Y/N  N/A No   Do you often choke when swallowing liquids or solid food?  No   Do you experience worsening symptoms when overheated? Yes   Do you need any new equipment such as a wheelchair, walker or shower chair? No   Do you receive co-pay financial assistance for your principal MS medicine? No   Would you be interested in participating in an MS research trial in the future? No   For patients on Gilenya, Tecfidera, Aubagio, Rituxan, Ocrevus, Tysabri, Lemtrada or Methotrexate, are you aware that you should NOT receive live virus vaccines?   Not Applicable   Do you feel you have adequate family/friend support?  Yes   Do you have health insurance?   Yes   Are you currently employed? No   Do you receive SSDI/SSI?  Not Applicable   Do you use marijuana or cannabis products? No   Have you been diagnosed with a urinary tract infection since your last visit here? No   Have you been diagnosed with a respiratory tract infection since your last visit here? No   Have you been to the emergency room since your last visit here? No   Have you been hospitalized since your last visit here?  No                    Objective:              10/19/2023    12:01 AM 4/15/2024    12:01 AM   Timed 25 Foot Walk:   Did patient wear an AFO? No No   Was assistive device used? Yes Yes   Assistive device used (cira one): Bilateral Assistance Bilateral Assistance   Bilateral device used Walker/Rollator Walker/Rollator   Time for 25 Foot Walk (seconds) 19.8 17.8   Time for 25 Foot Walk (seconds) 19.6 18.2         Neurologic Exam    MS: intact,   CN: no dysarthria or facial asymmetry;   No LASHON  MOTOR: LLE 4/5 in flexors;   REFLEXES 3+ t/o  GAIT: slow, walker; mild hyperextension right knee with right circumduction;  Rollator     Imaging:         Results for orders placed during the hospital encounter of 09/01/22    MRI Brain Demyelinating W W/O Contrast    Impression  White matter lesions consistent with demyelination/multiple sclerosis unchanged from the prior study.  There is a mild-moderate burden of white matter lesions.  These are all diffusion negative nonenhancing and no new lesions or diffusion positive lesions are seen.    Involutional change probably age appropriate.      Electronically signed by: Baldev Huynh MD  Date:    09/02/2022  Time:    09:30    No results found for this or any previous visit.    No results found for this or any previous visit.        Labs:     Lab Results   Component Value Date    YEFQNGTW23UQ 86 10/19/2023    SOEUFPDF15FN 90 08/01/2022     GJDOMKYA27NS 68 06/09/2021     Lab Results   Component Value Date    JCVINDEX 2.87 (A) 07/14/2016    JCVANTIBODY Positive (A) 07/14/2016     Lab Results   Component Value Date    KN7INQZP 64.8 11/14/2016    ABSOLUTECD3 654 (L) 11/14/2016    VQ7QIFRU 20.5 11/14/2016    ABSOLUTECD8 207 11/14/2016    OI9TBYJA 44.1 11/14/2016    ABSOLUTECD4 445 11/14/2016    LABCD48 2.15 11/14/2016     Lab Results   Component Value Date    WBC 7.38 01/24/2022    RBC 4.24 01/24/2022    HGB 12.9 01/24/2022    HCT 39.4 01/24/2022    MCV 93 01/24/2022    MCH 30.4 01/24/2022    MCHC 32.7 01/24/2022    RDW 13.4 01/24/2022     01/24/2022    MPV 10.9 01/24/2022    GRAN 5.1 01/24/2022    GRAN 68.5 01/24/2022    LYMPH 1.3 01/24/2022    LYMPH 17.6 (L) 01/24/2022    MONO 0.7 01/24/2022    MONO 9.2 01/24/2022    EOS 0.3 01/24/2022    BASO 0.05 01/24/2022    EOSINOPHIL 3.7 01/24/2022    BASOPHIL 0.7 01/24/2022     Sodium   Date Value Ref Range Status   11/30/2022 138 136 - 145 mmol/L Final     Potassium   Date Value Ref Range Status   11/30/2022 4.0 3.5 - 5.1 mmol/L Final     Chloride   Date Value Ref Range Status   11/30/2022 106 95 - 110 mmol/L Final     CO2   Date Value Ref Range Status   11/30/2022 27 23 - 29 mmol/L Final     Glucose   Date Value Ref Range Status   11/30/2022 96 70 - 110 mg/dL Final     BUN   Date Value Ref Range Status   11/30/2022 30 (H) 8 - 23 mg/dL Final     Creatinine   Date Value Ref Range Status   11/30/2022 0.9 0.5 - 1.4 mg/dL Final     Calcium   Date Value Ref Range Status   11/30/2022 10.1 8.7 - 10.5 mg/dL Final     Total Protein   Date Value Ref Range Status   01/24/2022 8.1 6.0 - 8.4 g/dL Final     Albumin   Date Value Ref Range Status   01/24/2022 3.8 3.5 - 5.2 g/dL Final     Total Bilirubin   Date Value Ref Range Status   01/24/2022 0.5 0.1 - 1.0 mg/dL Final     Comment:     For infants and newborns, interpretation of results should be based  on gestational age, weight and in agreement with  "clinical  observations.    Premature Infant recommended reference ranges:  Up to 24 hours.............<8.0 mg/dL  Up to 48 hours............<12.0 mg/dL  3-5 days..................<15.0 mg/dL  6-29 days.................<15.0 mg/dL       Alkaline Phosphatase   Date Value Ref Range Status   01/24/2022 71 55 - 135 U/L Final     AST   Date Value Ref Range Status   01/24/2022 20 10 - 40 U/L Final     ALT   Date Value Ref Range Status   01/24/2022 17 10 - 44 U/L Final     Anion Gap   Date Value Ref Range Status   11/30/2022 5 (L) 8 - 16 mmol/L Final     eGFR if    Date Value Ref Range Status   01/24/2022 >60.0 >60 mL/min/1.73 m^2 Final     eGFR if non    Date Value Ref Range Status   01/24/2022 >60.0 >60 mL/min/1.73 m^2 Final     Comment:     Calculation used to obtain the estimated glomerular filtration  rate (eGFR) is the CKD-EPI equation.        No results found for: "HEPBSAG", "HEPBSAB", "HEPBCAB"        MS Impression and Plan:     NEURO MULTIPLE SCLEROSIS IMPRESSION:   Number of relapses in the past year?:  0  Clinical Progression:  Improved  MRI Progression:  N/A  MS Classification:  Secondarily Progressive MS (static disability)  DMT:  No change in management  Symptom Management:  Implement change in symptom management  Implement Change in Symptom Management:  Bladder (d/c oxybutynin; start hs myrbetriq)  Supplements:  Vit D   She is stable - slightly improved actually   Continue Copaxone  F/u Dejah Peskin [facility] in 6mo   Do not think MRIs necessary as long as she remains clinically stable  Plan for PT again [facility] to be ordered at next visit              Problem List Items Addressed This Visit          Unprioritized    Counseling regarding goals of care    Prophylactic immunotherapy    Severe obesity (BMI 35.0-39.9) with comorbidity     Other Visit Diagnoses       MS (multiple sclerosis)    -  Primary    Neurogenic bladder        Relevant Medications    mirabegron (MYRBETRIQ) 25 mg " Tb24 ER tablet    Gait disturbance                Veena Joshua MD    I spent a total of 31 minutes on the day of the visit.This includes face to face time and non-face to face time preparing to see the patient (eg, review of tests), obtaining and/or reviewing separately obtained history, documenting clinical information in the electronic or other health record, independently interpreting results and communicating results to the patient/family/caregiver, or care coordinator.  Visit today included increased complexity associated with the care of the episodic problem : prophylactic immunotherapy;  addressed and managing the longitudinal care of the patient due to the serious and/or complex managed problem(s) MS.

## 2024-04-16 ENCOUNTER — PATIENT MESSAGE (OUTPATIENT)
Dept: NEUROLOGY | Facility: CLINIC | Age: 74
End: 2024-04-16
Payer: MEDICARE

## 2024-05-02 ENCOUNTER — PATIENT MESSAGE (OUTPATIENT)
Dept: PSYCHIATRY | Facility: CLINIC | Age: 74
End: 2024-05-02
Payer: MEDICARE

## 2024-05-07 DIAGNOSIS — N31.9 NEUROGENIC BLADDER: ICD-10-CM

## 2024-05-07 NOTE — TELEPHONE ENCOUNTER
Marie Lejeune (Key: [id number])  PA Case ID #: [id number]  Rx #: [id number]    PA for myrbetriq submitted via [facility]

## 2024-05-19 DIAGNOSIS — G35 MULTIPLE SCLEROSIS: ICD-10-CM

## 2024-05-19 DIAGNOSIS — R53.82 CHRONIC FATIGUE: ICD-10-CM

## 2024-05-20 RX ORDER — MODAFINIL 100 MG/1
100 TABLET ORAL DAILY
Qty: 90 TABLET | Refills: 1 | Status: SHIPPED | OUTPATIENT
Start: 2024-05-20

## 2024-05-28 DIAGNOSIS — N31.9 NEUROGENIC BLADDER: ICD-10-CM

## 2024-05-28 RX ORDER — MIRABEGRON 25 MG/1
25 TABLET, FILM COATED, EXTENDED RELEASE ORAL NIGHTLY
Qty: 30 TABLET | Refills: 11 | Status: CANCELLED | OUTPATIENT
Start: 2024-05-28 | End: 2025-05-28

## 2024-06-12 RX ORDER — MIRABEGRON 25 MG/1
25 TABLET, FILM COATED, EXTENDED RELEASE ORAL NIGHTLY
Qty: 90 TABLET | Refills: 1 | Status: SHIPPED | OUTPATIENT
Start: 2024-06-12 | End: 2025-06-12

## 2024-06-12 NOTE — TELEPHONE ENCOUNTER
Appeal for myrbetriq approved.     RX to be sent to [facility] home delivery    Patient aware

## 2024-06-26 ENCOUNTER — DOCUMENTATION ONLY (OUTPATIENT)
Dept: NEUROLOGY | Facility: CLINIC | Age: 74
End: 2024-06-26
Payer: MEDICARE

## 2024-07-25 ENCOUNTER — PATIENT MESSAGE (OUTPATIENT)
Dept: PSYCHIATRY | Facility: CLINIC | Age: 74
End: 2024-07-25
Payer: MEDICARE

## 2024-08-05 ENCOUNTER — PATIENT MESSAGE (OUTPATIENT)
Dept: PSYCHIATRY | Facility: CLINIC | Age: 74
End: 2024-08-05
Payer: MEDICARE

## 2024-09-02 DIAGNOSIS — G35 MULTIPLE SCLEROSIS: ICD-10-CM

## 2024-09-04 RX ORDER — GLATIRAMER ACETATE 40 MG/ML
INJECTION, SOLUTION SUBCUTANEOUS
Qty: 36 ML | Refills: 1 | Status: SHIPPED | OUTPATIENT
Start: 2024-09-04

## 2024-10-15 ENCOUNTER — OFFICE VISIT (OUTPATIENT)
Dept: NEUROLOGY | Facility: CLINIC | Age: 74
End: 2024-10-15
Payer: MEDICARE

## 2024-10-15 VITALS
BODY MASS INDEX: 37.87 KG/M2 | WEIGHT: 192.88 LBS | SYSTOLIC BLOOD PRESSURE: 127 MMHG | DIASTOLIC BLOOD PRESSURE: 75 MMHG | HEART RATE: 59 BPM | HEIGHT: 60 IN

## 2024-10-15 DIAGNOSIS — Z71.89 COUNSELING REGARDING GOALS OF CARE: ICD-10-CM

## 2024-10-15 DIAGNOSIS — G35 MULTIPLE SCLEROSIS: Primary | ICD-10-CM

## 2024-10-15 DIAGNOSIS — R26.9 GAIT DISTURBANCE: ICD-10-CM

## 2024-10-15 DIAGNOSIS — N31.9 NEUROGENIC BLADDER: ICD-10-CM

## 2024-10-15 DIAGNOSIS — Z29.89 PROPHYLACTIC IMMUNOTHERAPY: ICD-10-CM

## 2024-10-15 PROCEDURE — G2211 COMPLEX E/M VISIT ADD ON: HCPCS | Mod: S$PBB,,, | Performed by: CLINICAL NURSE SPECIALIST

## 2024-10-15 PROCEDURE — 99999 PR PBB SHADOW E&M-EST. PATIENT-LVL III: CPT | Mod: PBBFAC,,, | Performed by: CLINICAL NURSE SPECIALIST

## 2024-10-15 PROCEDURE — 99214 OFFICE O/P EST MOD 30 MIN: CPT | Mod: S$PBB,,, | Performed by: CLINICAL NURSE SPECIALIST

## 2024-10-15 PROCEDURE — 99213 OFFICE O/P EST LOW 20 MIN: CPT | Mod: PBBFAC | Performed by: CLINICAL NURSE SPECIALIST

## 2024-10-15 NOTE — Clinical Note
She did not discontinue oxybutynin after the last visit. She has started Myrbetriq, and we will increase the dose to 50mg. Just wanted to confirm that she should still stop oxybutynin. She is only on 5mg in the evening.

## 2024-10-15 NOTE — PROGRESS NOTES
Subjective:          Patient ID: Marie Lejeune is a 73 y.o. female who presents today for a routine clinic visit for MS.  She was last seen in April by Dr. Joshua. The history has been provided by the patient. She is accompanied by her .     MS HPI:  DMT: Glatiramer  Side effects from DMT? No  Taking vitamin D3 as recommended? Yes - 5000 units twice a week   She denies any recent infections.   She had COVID and flu shots last week and felt poorly after. She continues to not feel quite back to baseline.   She has been going to PT twice a week in [place] at [facility]. She feels stronger, and she feels like this has been helpful. She also does her stationary bike at home and does arm weights on her off days.   She denies any falls. She uses a rollator at home. She uses her scooter when she is going out for longer distances.   Myrbetriq does seem to be helpful, but she continues to get up at least 3 times per night to urinate.   She feels generally stable. She denies any sense of decline.       Medications:  Current Outpatient Medications   Medication Sig    ALPRAZolam (XANAX) 0.25 MG tablet TAKE 1 TO 2 TABLETS BY MOUTH EVERY 8 HOURS AS NEEDED    amLODIPine (NORVASC) 10 MG tablet Take 10 mg by mouth once daily.    ascorbic acid, vitamin C, (VITAMIN C) 500 MG tablet Take 500 mg by mouth once daily.    atenoloL (TENORMIN) 25 MG tablet Take 25 mg by mouth 2 (two) times daily.    biotin 300 mcg Tab PATIENT NOT CERTAIN ON THE DOSAGE. TAKE ONE capsule by MOUTH daily    calcium carbonate (OS-ANISHA) 600 mg (1,500 mg) Tab Take 600 mg by mouth 2 (two) times daily with meals.    co-enzyme Q-10 30 mg capsule Take 400 mg by mouth once daily.    COPAXONE 40 mg/mL injection INJECT 40 MG UNDER THE SKIN THREE TIMES A WEEK    cyanocobalamin 500 MCG tablet Take 500 mcg by mouth once daily.    ergocalciferol (VITAMIN D2) 50,000 unit Cap Take 5,000 Units by mouth once daily.     hydroCHLOROthiazide (MICROZIDE) 12.5 mg  capsule Take 12.5 mg by mouth once daily.    losartan (COZAAR) 100 MG tablet     modafiniL (PROVIGIL) 100 MG Tab Take 1 tablet (100 mg total) by mouth once daily.    oxybutynin (DITROPAN) 5 MG Tab TAKE 1 TABLET DAILY IN THE EVENING    simvastatin (ZOCOR) 40 MG tablet Take 40 mg by mouth every evening.    mirabegron (MYRBETRIQ) 50 mg Tb24 Take 1 tablet (50 mg total) by mouth once daily.    sertraline (ZOLOFT) 50 MG tablet Take 1 tablet (50 mg total) by mouth once daily.     No current facility-administered medications for this visit.       SOCIAL HISTORY  Social History     Tobacco Use    Smoking status: Never    Smokeless tobacco: Never       Living arrangements - the patient lives with her     ROS:      10/15/2024     5:43 AM   REVIEW OF SYMPTOMS   Do you feel abnormally tired on most days? No --takes modafinil daily    Do you feel you generally sleep well? Yes    Do you have difficulty controlling your bladder?  Yes --takes Myrbetriq and oxybutynin    Do you have difficulty controlling your bowels?  No    Do you have frequent muscle cramps, tightness or spasms in your limbs?  No    Do you have new visual symptoms?  No --wears glasses; sees eye doctor annually    Do you have worsening difficulty with your memory or thinking? No --her  manages medication and finances    Do you have worsening symptoms of anxiety or depression?  No    For patients who walk, Do you have more difficulty walking?  No    Have you fallen since your last visit?  No    For patients who use wheelchairs: Do you have any skin wounds or breakdown? Not Applicable    Do you have difficulty using your hands?  No    Do you have shooting or burning pain? No    Do you have difficulty with sexual function?  No    If you are sexually active, are you using birth control? Y/N  N/A No    Do you often choke when swallowing liquids or solid food?  No    Do you experience worsening symptoms when overheated? No --avoided the heat this summer; she  is sensitive to both heat and cold    Do you need any new equipment such as a wheelchair, walker or shower chair? No --has built in shower chair    Do you receive co-pay financial assistance for your principal MS medicine? No    Would you be interested in participating in an MS research trial in the future? No    For patients on Gilenya, Tecfidera, Aubagio, Rituxan, Ocrevus, Tysabri, Lemtrada or Methotrexate, are you aware that you should NOT receive live virus vaccines?  Yes    Do you feel you have adequate family/friend support?  Yes    Do you have health insurance?   Yes    Are you currently employed? No    Do you receive SSDI/SSI?  No    Do you use marijuana or cannabis products? No    Have you been diagnosed with a urinary tract infection since your last visit here? No    Have you been diagnosed with a respiratory tract infection since your last visit here? No    Have you been to the emergency room since your last visit here? No    Have you been hospitalized since your last visit here?  No        Patient-reported                Objective:        1. 25 foot timed walk:      4/15/2024    10:49 AM 10/15/2024    11:06 AM   Timed 25 Foot Walk:   Did patient wear an AFO? No No   Was assistive device used? Yes Yes   Assistive device used (cira one): Bilateral Assistance Bilateral Assistance   Bilateral device used Walker/Rollator Walker/Rollator   Time for 25 Foot Walk (seconds) 17.8 21.5   Time for 25 Foot Walk (seconds) 18.2 21.6       Neurological Exam    Slightly uncoordinated toe tapping and heel to shin on left   Positive Romberg    Imaging:         Results for orders placed during the hospital encounter of 09/01/22    MRI Brain Demyelinating W W/O Contrast    Impression  White matter lesions consistent with demyelination/multiple sclerosis unchanged from the prior study.  There is a mild-moderate burden of white matter lesions.  These are all diffusion negative nonenhancing and no new lesions or diffusion  positive lesions are seen.    Involutional change probably age appropriate.      Electronically signed by: Baldev Huynh MD  Date:    09/02/2022        Labs:     Lab Results   Component Value Date    TRGYAJWZ99ZQ 86 10/19/2023    WVTHKMOL28GO 90 08/01/2022    MHZDUHWS26TX 68 06/09/2021     Lab Results   Component Value Date    JCVINDEX 2.87 (A) 07/14/2016    JCVANTIBODY Positive (A) 07/14/2016     Lab Results   Component Value Date    ZY7LKGRA 64.8 11/14/2016    ABSOLUTECD3 654 (L) 11/14/2016    AD1QFSST 20.5 11/14/2016    ABSOLUTECD8 207 11/14/2016    AG8WNXAD 44.1 11/14/2016    ABSOLUTECD4 445 11/14/2016    LABCD48 2.15 11/14/2016     Lab Results   Component Value Date    WBC 7.38 01/24/2022    RBC 4.24 01/24/2022    HGB 12.9 01/24/2022    HCT 39.4 01/24/2022    MCV 93 01/24/2022    MCH 30.4 01/24/2022    MCHC 32.7 01/24/2022    RDW 13.4 01/24/2022     01/24/2022    MPV 10.9 01/24/2022    GRAN 5.1 01/24/2022    GRAN 68.5 01/24/2022    LYMPH 1.3 01/24/2022    LYMPH 17.6 (L) 01/24/2022    MONO 0.7 01/24/2022    MONO 9.2 01/24/2022    EOS 0.3 01/24/2022    BASO 0.05 01/24/2022    EOSINOPHIL 3.7 01/24/2022    BASOPHIL 0.7 01/24/2022     Sodium   Date Value Ref Range Status   11/30/2022 138 136 - 145 mmol/L Final     Potassium   Date Value Ref Range Status   11/30/2022 4.0 3.5 - 5.1 mmol/L Final     Chloride   Date Value Ref Range Status   11/30/2022 106 95 - 110 mmol/L Final     CO2   Date Value Ref Range Status   11/30/2022 27 23 - 29 mmol/L Final     Glucose   Date Value Ref Range Status   11/30/2022 96 70 - 110 mg/dL Final     BUN   Date Value Ref Range Status   11/30/2022 30 (H) 8 - 23 mg/dL Final     Creatinine   Date Value Ref Range Status   11/30/2022 0.9 0.5 - 1.4 mg/dL Final     Calcium   Date Value Ref Range Status   11/30/2022 10.1 8.7 - 10.5 mg/dL Final     Total Protein   Date Value Ref Range Status   01/24/2022 8.1 6.0 - 8.4 g/dL Final     Albumin   Date Value Ref Range Status   01/24/2022 3.8  3.5 - 5.2 g/dL Final     Total Bilirubin   Date Value Ref Range Status   01/24/2022 0.5 0.1 - 1.0 mg/dL Final     Comment:     For infants and newborns, interpretation of results should be based  on gestational age, weight and in agreement with clinical  observations.    Premature Infant recommended reference ranges:  Up to 24 hours.............<8.0 mg/dL  Up to 48 hours............<12.0 mg/dL  3-5 days..................<15.0 mg/dL  6-29 days.................<15.0 mg/dL       Alkaline Phosphatase   Date Value Ref Range Status   01/24/2022 71 55 - 135 U/L Final     AST   Date Value Ref Range Status   01/24/2022 20 10 - 40 U/L Final     ALT   Date Value Ref Range Status   01/24/2022 17 10 - 44 U/L Final     Anion Gap   Date Value Ref Range Status   11/30/2022 5 (L) 8 - 16 mmol/L Final     eGFR if    Date Value Ref Range Status   01/24/2022 >60.0 >60 mL/min/1.73 m^2 Final     eGFR if non    Date Value Ref Range Status   01/24/2022 >60.0 >60 mL/min/1.73 m^2 Final     Comment:     Calculation used to obtain the estimated glomerular filtration  rate (eGFR) is the CKD-EPI equation.            MS Impression and Plan:     NEURO MULTIPLE SCLEROSIS IMPRESSION:   Number of relapses in the past year?:  0  Clinical Progression:  Worsened  Clinical Progression comment:  Walk time is slower today, but she feels very affected by recent vaccines; however, this is improving.     DMT:  No change in management  DMT comment:  Continue Copaxone and Vitamin D.   Symptom Management:  Implement change in symptom management  Implement Change in Symptom Management:  Bladder (Myrbetriq dose increased to 50mg. Rx sent to pharmacy.)  She will continue physical therapy.   We will continue to hold on MRIs unless clinically indicated.   She will follow up with Dr. Joshua in 4 months.   The visit today is associated with current or anticipated ongoing medical care related to this patient's single serious  condition/complex condition of multiple sclerosis.    Total time spent with patient: 24 minutes   Total time spent on encounter: 30 minutes         LATISHA Coles, CNS    Problem List Items Addressed This Visit          Neurologic Problems    Multiple sclerosis - Primary       Other    Counseling regarding goals of care    Prophylactic immunotherapy     Other Visit Diagnoses       Neurogenic bladder        Relevant Medications    mirabegron (MYRBETRIQ) 50 mg Tb24    Gait disturbance

## 2024-10-16 RX ORDER — MIRABEGRON 50 MG/1
50 TABLET, EXTENDED RELEASE ORAL DAILY
Qty: 90 TABLET | Refills: 1 | Status: SHIPPED | OUTPATIENT
Start: 2024-10-16 | End: 2025-10-16

## 2024-11-14 ENCOUNTER — TELEPHONE (OUTPATIENT)
Dept: NEUROLOGY | Facility: CLINIC | Age: 74
End: 2024-11-14
Payer: MEDICARE

## 2024-11-14 NOTE — TELEPHONE ENCOUNTER
----- Message from Dejah Cordero sent at 11/8/2024  5:36 PM CST -----  Can you let Ms. Wolf know that I'd like for her to stop taking her oxybutynin? Since she is on the Myrbetriq now, she doesn't need to take  both. If symptoms worsen, she should let us know.     Dejah  ----- Message -----  From: Dejah Cordero, APRN, CNS  Sent: 10/16/2024  12:15 PM CST  To: Veena Joshua MD; #    She did not discontinue oxybutynin after the last visit. She has started Myrbetriq, and we will increase the dose to 50mg. Just wanted to confirm that she should still stop oxybutynin. She is only on 5mg in the evening.

## 2024-11-17 DIAGNOSIS — G35 MULTIPLE SCLEROSIS: ICD-10-CM

## 2024-11-17 DIAGNOSIS — R53.82 CHRONIC FATIGUE: ICD-10-CM

## 2024-11-18 RX ORDER — MODAFINIL 100 MG/1
100 TABLET ORAL
Qty: 90 TABLET | Refills: 1 | Status: SHIPPED | OUTPATIENT
Start: 2024-11-18

## 2024-12-16 ENCOUNTER — PATIENT MESSAGE (OUTPATIENT)
Dept: PSYCHIATRY | Facility: CLINIC | Age: 74
End: 2024-12-16
Payer: MEDICARE

## 2025-02-17 DIAGNOSIS — G35 MULTIPLE SCLEROSIS: ICD-10-CM

## 2025-02-17 RX ORDER — GLATIRAMER ACETATE 40 MG/ML
INJECTION, SOLUTION SUBCUTANEOUS
Qty: 36 ML | Refills: 1 | Status: SHIPPED | OUTPATIENT
Start: 2025-02-17

## 2025-02-19 ENCOUNTER — OFFICE VISIT (OUTPATIENT)
Dept: NEUROLOGY | Facility: CLINIC | Age: 75
End: 2025-02-19
Payer: MEDICARE

## 2025-02-19 ENCOUNTER — LAB VISIT (OUTPATIENT)
Dept: LAB | Facility: HOSPITAL | Age: 75
End: 2025-02-19
Attending: PSYCHIATRY & NEUROLOGY
Payer: MEDICARE

## 2025-02-19 VITALS
BODY MASS INDEX: 38.43 KG/M2 | DIASTOLIC BLOOD PRESSURE: 64 MMHG | HEIGHT: 60 IN | WEIGHT: 195.75 LBS | SYSTOLIC BLOOD PRESSURE: 125 MMHG

## 2025-02-19 DIAGNOSIS — Z71.89 COUNSELING REGARDING GOALS OF CARE: ICD-10-CM

## 2025-02-19 DIAGNOSIS — G35 MULTIPLE SCLEROSIS: ICD-10-CM

## 2025-02-19 DIAGNOSIS — G35 MS (MULTIPLE SCLEROSIS): ICD-10-CM

## 2025-02-19 DIAGNOSIS — R26.9 GAIT DISTURBANCE: ICD-10-CM

## 2025-02-19 DIAGNOSIS — N31.9 NEUROGENIC BLADDER: ICD-10-CM

## 2025-02-19 DIAGNOSIS — E55.9 VITAMIN D DEFICIENCY: ICD-10-CM

## 2025-02-19 DIAGNOSIS — E55.9 VITAMIN D DEFICIENCY: Primary | ICD-10-CM

## 2025-02-19 DIAGNOSIS — Z29.89 PROPHYLACTIC IMMUNOTHERAPY: ICD-10-CM

## 2025-02-19 DIAGNOSIS — E66.01 SEVERE OBESITY (BMI 35.0-39.9) WITH COMORBIDITY: ICD-10-CM

## 2025-02-19 LAB — 25(OH)D3+25(OH)D2 SERPL-MCNC: 75 NG/ML (ref 30–96)

## 2025-02-19 PROCEDURE — 82306 VITAMIN D 25 HYDROXY: CPT | Performed by: PSYCHIATRY & NEUROLOGY

## 2025-02-19 PROCEDURE — 36415 COLL VENOUS BLD VENIPUNCTURE: CPT | Performed by: PSYCHIATRY & NEUROLOGY

## 2025-02-19 PROCEDURE — 99213 OFFICE O/P EST LOW 20 MIN: CPT | Mod: PBBFAC | Performed by: PSYCHIATRY & NEUROLOGY

## 2025-02-19 RX ORDER — AMLODIPINE AND OLMESARTAN MEDOXOMIL 5; 40 MG/1; MG/1
TABLET ORAL
COMMUNITY
Start: 2025-02-19

## 2025-02-19 NOTE — PROGRESS NOTES
Subjective:          Patient ID: Marie Lejeune is a 74 y.o. female who presents today for a routine clinic visit for MS.  She comes in with her     MS HPI:  DMT: glatiramer acetate  Side effects from DMT? No  Taking vitamin D3 as recommended? Yes - 5,000 three times/ week   She reports the higher dose of myrbetriq was helpful. She tried to d/c oxybutynin but had incontinence after that.  Wears a pad daily;   Going to PT twice / week; she finds it really helps her   No sense of relapse or progressive decline; her walk is the same;   Denies muscle spasm  Takes modafinil every morning     Medications:  Current Outpatient Medications   Medication Sig    ALPRAZolam (XANAX) 0.25 MG tablet TAKE 1 TO 2 TABLETS BY MOUTH EVERY 8 HOURS AS NEEDED    amlodipine-olmesartan (BERE) 5-40 mg per tablet     ascorbic acid, vitamin C, (VITAMIN C) 500 MG tablet Take 500 mg by mouth once daily.    atenoloL (TENORMIN) 25 MG tablet Take 25 mg by mouth 2 (two) times daily.    biotin 300 mcg Tab PATIENT NOT CERTAIN ON THE DOSAGE. TAKE ONE capsule by MOUTH daily    calcium carbonate (OS-ANISHA) 600 mg (1,500 mg) Tab Take 600 mg by mouth 2 (two) times daily with meals.    co-enzyme Q-10 30 mg capsule Take 400 mg by mouth once daily.    COPAXONE 40 mg/mL injection INJECT 40 MG UNDER THE SKIN THREE TIMES A WEEK    cyanocobalamin 500 MCG tablet Take 500 mcg by mouth once daily.    ergocalciferol (VITAMIN D2) 50,000 unit Cap Take 5,000 Units by mouth once daily.     hydroCHLOROthiazide (MICROZIDE) 12.5 mg capsule Take 12.5 mg by mouth once daily.    mirabegron (MYRBETRIQ) 50 mg Tb24 Take 1 tablet (50 mg total) by mouth once daily.    modafiniL (PROVIGIL) 100 MG Tab TAKE 1 TABLET DAILY    oxybutynin (DITROPAN) 5 MG Tab TAKE 1 TABLET DAILY IN THE EVENING    simvastatin (ZOCOR) 40 MG tablet Take 40 mg by mouth every evening.    sertraline (ZOLOFT) 50 MG tablet Take 1 tablet (50 mg total) by mouth once daily.     No current facility-administered  medications for this visit.       SOCIAL HISTORY  Social History[1]    Living arrangements - the patient lives with their spouse.    ROS:        2/18/2025     6:37 AM   REVIEW OF SYMPTOMS   Do you feel abnormally tired on most days? Yes   Do you feel you generally sleep well? Yes   Do you have difficulty controlling your bladder?  No   Do you have difficulty controlling your bowels?  No   Do you have frequent muscle cramps, tightness or spasms in your limbs?  No   Do you have new visual symptoms?  No   Do you have worsening difficulty with your memory or thinking? No   Do you have worsening symptoms of anxiety or depression?  No   For patients who walk, Do you have more difficulty walking?  Yes   Have you fallen since your last visit?  No   For patients who use wheelchairs: Do you have any skin wounds or breakdown? Not Applicable   Do you have difficulty using your hands?  No   Do you have shooting or burning pain? No   Do you have difficulty with sexual function?  No   If you are sexually active, are you using birth control? Y/N  N/A Not Applicable   Do you often choke when swallowing liquids or solid food?  No   Do you experience worsening symptoms when overheated? Yes   Do you need any new equipment such as a wheelchair, walker or shower chair? No   Do you receive co-pay financial assistance for your principal MS medicine? No   Would you be interested in participating in an MS research trial in the future? No   For patients on Gilenya, Tecfidera, Aubagio, Rituxan, Ocrevus, Tysabri, Lemtrada or Methotrexate, are you aware that you should NOT receive live virus vaccines?  Not Applicable   Do you feel you have adequate family/friend support?  Yes   Do you have health insurance?   Yes   Are you currently employed? No   Do you receive SSDI/SSI?  No   Do you use marijuana or cannabis products? No   Have you been diagnosed with a urinary tract infection since your last visit here? No   Have you been diagnosed with a  respiratory tract infection since your last visit here? No   Have you been to the emergency room since your last visit here? No   Have you been hospitalized since your last visit here?  No         2/19/2025     7:29 AM   FSS SCORE & INTERPRETATION   FSS SCORE  53    FSS SCORE INTERPRETATION May be suffering from fatigue        Patient-reported         2/19/2025     7:27 AM   MS NENO-D SCORE & INTERPRETATION   NENO-D SCORE  8    NENO-D INTERPRETATION  No indication of Depression        Patient-reported         2/18/2025     6:39 AM   MS SUE-7 SCORE & INTERPRETATION   SUE-7 SCORE  0    SUE-7 SCORE INTERPRETATION Normal        Patient-reported         2/19/2025     7:31 AM   PEQ MS MOS PAIN EFFECTS SCORE & INTERPRETATION   PES SCORE 17    PES SCORE INTERPRETATION Scores can range from 6-30.  Items are scaled so that higher scores indicate a greater impact of pain on a patients mood and behavior.        Patient-reported         2/19/2025     7:33 AM   PEQ MS SEXUAL SATISFACTION SCORE & INTERPRETATION   SSS SCORE  20    SSS SCORE INTERPRETATION Scores can range from 4-24.  Higher scores indicate greater problems with sexual satisfaction.        Patient-reported         2/19/2025     7:35 AM   MS BLADDER CONTROL SCORE & INTERPRETATION   BLCS SCORE 4    BLCS SCORE INTERPRETATION  Scores can range from 0-22, with higher scores indicating greater bladder control problems.        Patient-reported         2/19/2025     7:42 AM   MS BOWEL CONTROL SCORE & INTERPRETATION   BWCS SCORE 1    BWCS SCORE INTERPRETATION Scores can range from 0-26, with higher scores indicating greater bowel control problems.        Patient-reported         2/19/2025     7:38 AM   PEQ MS IMPACT OF VISUAL IMPAIRMENT SCORE & INTERPRETATION   ESTEFANIA SCALE SCORE  1    ESTEFANIA SCORE INTERPRETATION Scores can range from 0-15, with higher scores indicating greater impact of visual problems on daily activites.        Patient-reported         2/19/2025     7:40 AM   MS  PDQ SCORE & INTERPRETATION   PDQ RETROSPECTIVE MEMORY SUBSCALE 0    PDQ ATTENTION/CONCENTRATION SUBSCALE 2    PDQ PROSPECTIVE MEMORY SUBSCALE 1    PDQ PLANNING/ORGANIZATION SUBSCALE 2    PDQ TOTAL SCORE 5    PDQ SCORE INTERPRETATION Scores can range from 0-80, with higher scores indicating greater perceived cognitive impairment.        Patient-reported         2/19/2025     7:44 AM   MSSS SCORE & INTERPRETATION   MSSS TANGIBLE SUPPORT SUBSCALE 100    MSSS EMOTIONAL/INFORMATIONAL SUPPORT SUBSCALE 90.63    MSSS AFFECTIONATE SUPPORT SUBSCALE 100    MSSS POSITIVE SOCIAL INTERACTION SUBSCALE 91.67    MSSS TOTAL SCORE 95.58    MSSS SCORE INTERPRETATION Scores can range from 0-100, with higher scores indicating greater perceived support.        Patient-reported            Objective:            10/15/2024    10:30 AM 2/19/2025    10:20 AM   Timed 25 Foot Walk:   Did patient wear an AFO? No No   Was assistive device used? Yes Yes   Assistive device used (cira one): Bilateral Assistance Bilateral Assistance   Bilateral device used Walker/Rollator Walker/Rollator   Time for 25 Foot Walk (seconds) 21.5 21.2   Time for 25 Foot Walk (seconds) 21.6 21.9       Neurological Exam  MS: intact,   CN: no dysarthria or facial asymmetry;   No LASHON  MOTOR: LLE 4-/5 in flexors;   SENS: moderate decrease in distal LE  GAIT: slow, walker; mild hyperextension right knee with right circumduction;  Rollator          Imaging:         Results for orders placed during the hospital encounter of 09/01/22    MRI Brain Demyelinating W W/O Contrast    Impression  White matter lesions consistent with demyelination/multiple sclerosis unchanged from the prior study.  There is a mild-moderate burden of white matter lesions.  These are all diffusion negative nonenhancing and no new lesions or diffusion positive lesions are seen.    Involutional change probably age appropriate.      Electronically signed by: Baldev Huynh  MD  Date:    09/02/2022  Time:    09:30    No results found for this or any previous visit.    No results found for this or any previous visit.        Labs:     Lab Results   Component Value Date    FYPLLYDX52BD 86 10/19/2023    BPSLVQNA46OW 90 08/01/2022    FPKYXFSC32VV 68 06/09/2021     Lab Results   Component Value Date    JCVINDEX 2.87 (A) 07/14/2016    JCVANTIBODY Positive (A) 07/14/2016     Lab Results   Component Value Date    UL5TJUHM 64.8 11/14/2016    ABSOLUTECD3 654 (L) 11/14/2016    SE8IIQPQ 20.5 11/14/2016    ABSOLUTECD8 207 11/14/2016    QV0VGBEL 44.1 11/14/2016    ABSOLUTECD4 445 11/14/2016    LABCD48 2.15 11/14/2016     Lab Results   Component Value Date    WBC 7.38 01/24/2022    RBC 4.24 01/24/2022    HGB 12.9 01/24/2022    HCT 39.4 01/24/2022    MCV 93 01/24/2022    MCH 30.4 01/24/2022    MCHC 32.7 01/24/2022    RDW 13.4 01/24/2022     01/24/2022    MPV 10.9 01/24/2022    GRAN 5.1 01/24/2022    GRAN 68.5 01/24/2022    LYMPH 1.3 01/24/2022    LYMPH 17.6 (L) 01/24/2022    MONO 0.7 01/24/2022    MONO 9.2 01/24/2022    EOS 0.3 01/24/2022    BASO 0.05 01/24/2022    EOSINOPHIL 3.7 01/24/2022    BASOPHIL 0.7 01/24/2022     Sodium   Date Value Ref Range Status   11/30/2022 138 136 - 145 mmol/L Final     Potassium   Date Value Ref Range Status   11/30/2022 4.0 3.5 - 5.1 mmol/L Final     Chloride   Date Value Ref Range Status   11/30/2022 106 95 - 110 mmol/L Final     CO2   Date Value Ref Range Status   11/30/2022 27 23 - 29 mmol/L Final     Glucose   Date Value Ref Range Status   11/30/2022 96 70 - 110 mg/dL Final     BUN   Date Value Ref Range Status   11/30/2022 30 (H) 8 - 23 mg/dL Final     Creatinine   Date Value Ref Range Status   11/30/2022 0.9 0.5 - 1.4 mg/dL Final     Calcium   Date Value Ref Range Status   11/30/2022 10.1 8.7 - 10.5 mg/dL Final     Total Protein   Date Value Ref Range Status   01/24/2022 8.1 6.0 - 8.4 g/dL Final     Albumin   Date Value Ref Range Status   01/24/2022 3.8 3.5  "- 5.2 g/dL Final     Total Bilirubin   Date Value Ref Range Status   01/24/2022 0.5 0.1 - 1.0 mg/dL Final     Comment:     For infants and newborns, interpretation of results should be based  on gestational age, weight and in agreement with clinical  observations.    Premature Infant recommended reference ranges:  Up to 24 hours.............<8.0 mg/dL  Up to 48 hours............<12.0 mg/dL  3-5 days..................<15.0 mg/dL  6-29 days.................<15.0 mg/dL       Alkaline Phosphatase   Date Value Ref Range Status   01/24/2022 71 55 - 135 U/L Final     AST   Date Value Ref Range Status   01/24/2022 20 10 - 40 U/L Final     ALT   Date Value Ref Range Status   01/24/2022 17 10 - 44 U/L Final     Anion Gap   Date Value Ref Range Status   11/30/2022 5 (L) 8 - 16 mmol/L Final     eGFR if    Date Value Ref Range Status   01/24/2022 >60.0 >60 mL/min/1.73 m^2 Final     eGFR if non    Date Value Ref Range Status   01/24/2022 >60.0 >60 mL/min/1.73 m^2 Final     Comment:     Calculation used to obtain the estimated glomerular filtration  rate (eGFR) is the CKD-EPI equation.        No results found for: "HEPBSAG", "HEPBSAB", "HEPBCAB"        MS Impression and Plan:     NEURO MULTIPLE SCLEROSIS IMPRESSION:   Number of relapses in the past year?:  0  Clinical Progression:  Clinically Stable  MRI Progression:  N/A  MS Classification:  Relapsing-Remitting MS  MS Classification comment: With static disability; no recent evidence of progression over past 4 years  Current DMT: glatiramer acetate  DMT:  No change in management  Symptom Management:  No change in symptom management  Additional Impressions: The regular PT is clearly helping her maintain her function   Continue GA and vit D  Bladder table on two meds  F/u 6 mo Dejah Peskin CNS  MRIs deferred            Problem List Items Addressed This Visit          Unprioritized    Counseling regarding goals of care    Vitamin D deficiency - " Primary    Relevant Orders    Vitamin D    Prophylactic immunotherapy    Multiple sclerosis    Severe obesity (BMI 35.0-39.9) with comorbidity     Other Visit Diagnoses         MS (multiple sclerosis)          Neurogenic bladder          Gait disturbance                Veena Joshua MD    I spent a total of 30 minutes on the day of the visit.This includes face to face time and non-face to face time preparing to see the patient (eg, review of tests), obtaining and/or reviewing separately obtained history, documenting clinical information in the electronic or other health record, independently interpreting results and communicating results to the patient/family/caregiver, or care coordinator.  Visit today included increased complexity associated with the care of the episodic problem : chronic immunotherapy; addressed and managing the longitudinal care of the patient due to the serious and/or complex managed problem(s) MS.         [1]   Social History  Tobacco Use    Smoking status: Never    Smokeless tobacco: Never

## 2025-03-07 ENCOUNTER — PATIENT MESSAGE (OUTPATIENT)
Dept: PSYCHIATRY | Facility: CLINIC | Age: 75
End: 2025-03-07
Payer: MEDICARE

## 2025-04-16 DIAGNOSIS — G35 MULTIPLE SCLEROSIS: ICD-10-CM

## 2025-04-16 DIAGNOSIS — N31.9 NEUROGENIC BLADDER: ICD-10-CM

## 2025-04-17 DIAGNOSIS — N31.9 NEUROGENIC BLADDER: ICD-10-CM

## 2025-04-20 RX ORDER — OXYBUTYNIN CHLORIDE 5 MG/1
5 TABLET ORAL NIGHTLY
Qty: 90 TABLET | Refills: 3 | Status: SHIPPED | OUTPATIENT
Start: 2025-04-20

## 2025-04-20 RX ORDER — MIRABEGRON 50 MG/1
1 TABLET, FILM COATED, EXTENDED RELEASE ORAL
Qty: 90 TABLET | Refills: 3 | Status: SHIPPED | OUTPATIENT
Start: 2025-04-20

## 2025-05-13 ENCOUNTER — PATIENT MESSAGE (OUTPATIENT)
Dept: PSYCHIATRY | Facility: CLINIC | Age: 75
End: 2025-05-13
Payer: MEDICARE

## 2025-05-25 DIAGNOSIS — R53.82 CHRONIC FATIGUE: ICD-10-CM

## 2025-05-25 DIAGNOSIS — G35 MULTIPLE SCLEROSIS: ICD-10-CM

## 2025-05-26 RX ORDER — MODAFINIL 100 MG/1
100 TABLET ORAL
Qty: 90 TABLET | Refills: 1 | Status: SHIPPED | OUTPATIENT
Start: 2025-05-26

## 2025-06-19 ENCOUNTER — PATIENT MESSAGE (OUTPATIENT)
Dept: PSYCHIATRY | Facility: CLINIC | Age: 75
End: 2025-06-19
Payer: MEDICARE

## 2025-07-28 DIAGNOSIS — N31.9 NEUROGENIC BLADDER: ICD-10-CM

## 2025-07-28 DIAGNOSIS — G35 MULTIPLE SCLEROSIS: ICD-10-CM

## 2025-07-28 RX ORDER — OXYBUTYNIN CHLORIDE 5 MG/1
5 TABLET ORAL NIGHTLY
Qty: 90 TABLET | Refills: 0 | Status: SHIPPED | OUTPATIENT
Start: 2025-07-28

## 2025-07-28 NOTE — TELEPHONE ENCOUNTER
"Copied from CRM #[id number]. Topic: Medications - Pharmacy  >> Jul 28, 2025  9:57 AM Hannah wrote:  .Name Of Caller: Lianna/ Freddy      Contact Preference?:[phone]      What is the nature of the call?: requesting refill for    oxybutynin (DITROPAN) 5 MG Tab. [facility]       .          [id number] - [place] - [address] [place] [address]  [place] [id number]  Phone: [phone] Fax: [phone]        Additional Notes:  "Thank you for all that you do for our patients"  "

## 2025-07-30 ENCOUNTER — PATIENT MESSAGE (OUTPATIENT)
Dept: PSYCHIATRY | Facility: CLINIC | Age: 75
End: 2025-07-30
Payer: MEDICARE

## 2025-08-01 ENCOUNTER — PATIENT MESSAGE (OUTPATIENT)
Dept: PSYCHIATRY | Facility: CLINIC | Age: 75
End: 2025-08-01
Payer: MEDICARE

## 2025-08-11 DIAGNOSIS — G35 MULTIPLE SCLEROSIS: ICD-10-CM

## 2025-08-11 RX ORDER — GLATIRAMER ACETATE 40 MG/ML
INJECTION, SOLUTION SUBCUTANEOUS
Qty: 36 ML | Refills: 3 | Status: SHIPPED | OUTPATIENT
Start: 2025-08-11